# Patient Record
Sex: FEMALE | Race: WHITE | Employment: UNEMPLOYED | URBAN - METROPOLITAN AREA
[De-identification: names, ages, dates, MRNs, and addresses within clinical notes are randomized per-mention and may not be internally consistent; named-entity substitution may affect disease eponyms.]

---

## 2017-01-26 ENCOUNTER — OFFICE VISIT (OUTPATIENT)
Dept: FAMILY MEDICINE CLINIC | Facility: CLINIC | Age: 32
End: 2017-01-26

## 2017-01-26 ENCOUNTER — LAB ENCOUNTER (OUTPATIENT)
Dept: LAB | Age: 32
End: 2017-01-26
Attending: INTERNAL MEDICINE
Payer: COMMERCIAL

## 2017-01-26 VITALS
DIASTOLIC BLOOD PRESSURE: 80 MMHG | SYSTOLIC BLOOD PRESSURE: 120 MMHG | BODY MASS INDEX: 24.03 KG/M2 | HEIGHT: 63 IN | RESPIRATION RATE: 16 BRPM | HEART RATE: 72 BPM | TEMPERATURE: 99 F | WEIGHT: 135.63 LBS

## 2017-01-26 DIAGNOSIS — E55.9 VITAMIN D DEFICIENCY: ICD-10-CM

## 2017-01-26 DIAGNOSIS — E53.8 B12 DEFICIENCY: ICD-10-CM

## 2017-01-26 DIAGNOSIS — Z00.00 LABORATORY EXAMINATION ORDERED AS PART OF A ROUTINE GENERAL MEDICAL EXAMINATION: ICD-10-CM

## 2017-01-26 DIAGNOSIS — Z00.00 WELL WOMAN EXAM (NO GYNECOLOGICAL EXAM): Primary | ICD-10-CM

## 2017-01-26 LAB
25-HYDROXYVITAMIN D (TOTAL): 7.5 NG/ML (ref 30–100)
ALBUMIN SERPL-MCNC: 4.1 G/DL (ref 3.5–4.8)
ALP LIVER SERPL-CCNC: 79 U/L (ref 37–98)
ALT SERPL-CCNC: 16 U/L (ref 14–54)
AST SERPL-CCNC: 11 U/L (ref 15–41)
BASOPHILS # BLD AUTO: 0.03 X10(3) UL (ref 0–0.1)
BASOPHILS NFR BLD AUTO: 0.4 %
BILIRUB SERPL-MCNC: 0.2 MG/DL (ref 0.1–2)
BUN BLD-MCNC: 16 MG/DL (ref 8–20)
CALCIUM BLD-MCNC: 9 MG/DL (ref 8.3–10.3)
CHLORIDE: 104 MMOL/L (ref 101–111)
CHOLEST SMN-MCNC: 146 MG/DL (ref ?–200)
CO2: 28 MMOL/L (ref 22–32)
CREAT BLD-MCNC: 0.69 MG/DL (ref 0.55–1.02)
EOSINOPHIL # BLD AUTO: 0.1 X10(3) UL (ref 0–0.3)
EOSINOPHIL NFR BLD AUTO: 1.4 %
ERYTHROCYTE [DISTWIDTH] IN BLOOD BY AUTOMATED COUNT: 13.6 % (ref 11.5–16)
GLUCOSE BLD-MCNC: 91 MG/DL (ref 70–99)
HAV AB SERPL IA-ACNC: 362 PG/ML (ref 193–986)
HCT VFR BLD AUTO: 37.4 % (ref 34–50)
HDLC SERPL-MCNC: 72 MG/DL (ref 45–?)
HDLC SERPL: 2.03 {RATIO} (ref ?–4.44)
HGB BLD-MCNC: 12.2 G/DL (ref 12–16)
IMMATURE GRANULOCYTE COUNT: 0.02 X10(3) UL (ref 0–1)
IMMATURE GRANULOCYTE RATIO %: 0.3 %
LDLC SERPL CALC-MCNC: 56 MG/DL (ref ?–130)
LYMPHOCYTES # BLD AUTO: 2.1 X10(3) UL (ref 0.9–4)
LYMPHOCYTES NFR BLD AUTO: 30.4 %
M PROTEIN MFR SERPL ELPH: 8.4 G/DL (ref 6.1–8.3)
MCH RBC QN AUTO: 26.6 PG (ref 27–33.2)
MCHC RBC AUTO-ENTMCNC: 32.6 G/DL (ref 31–37)
MCV RBC AUTO: 81.7 FL (ref 81–100)
MONOCYTES # BLD AUTO: 0.46 X10(3) UL (ref 0.1–0.6)
MONOCYTES NFR BLD AUTO: 6.7 %
NEUTROPHIL ABS PRELIM: 4.19 X10 (3) UL (ref 1.3–6.7)
NEUTROPHILS # BLD AUTO: 4.19 X10(3) UL (ref 1.3–6.7)
NEUTROPHILS NFR BLD AUTO: 60.8 %
NONHDLC SERPL-MCNC: 74 MG/DL (ref ?–130)
PLATELET # BLD AUTO: 207 10(3)UL (ref 150–450)
POTASSIUM SERPL-SCNC: 4 MMOL/L (ref 3.6–5.1)
RBC # BLD AUTO: 4.58 X10(6)UL (ref 3.8–5.1)
RED CELL DISTRIBUTION WIDTH-SD: 39.1 FL (ref 35.1–46.3)
SODIUM SERPL-SCNC: 139 MMOL/L (ref 136–144)
TRIGLYCERIDES: 91 MG/DL (ref ?–150)
TSI SER-ACNC: 0.62 MIU/ML (ref 0.35–5.5)
VLDL: 18 MG/DL (ref 5–40)
WBC # BLD AUTO: 6.9 X10(3) UL (ref 4–13)

## 2017-01-26 PROCEDURE — 80053 COMPREHEN METABOLIC PANEL: CPT

## 2017-01-26 PROCEDURE — 99395 PREV VISIT EST AGE 18-39: CPT | Performed by: INTERNAL MEDICINE

## 2017-01-26 PROCEDURE — 84443 ASSAY THYROID STIM HORMONE: CPT

## 2017-01-26 PROCEDURE — 85025 COMPLETE CBC W/AUTO DIFF WBC: CPT

## 2017-01-26 PROCEDURE — 82306 VITAMIN D 25 HYDROXY: CPT

## 2017-01-26 PROCEDURE — 82607 VITAMIN B-12: CPT

## 2017-01-26 PROCEDURE — 36415 COLL VENOUS BLD VENIPUNCTURE: CPT

## 2017-01-26 PROCEDURE — 80061 LIPID PANEL: CPT

## 2017-01-26 NOTE — PROGRESS NOTES
REASON FOR VISIT:    Margo Garay is a 28year old female who presents for an [address]. History of b12 deficiency and feels that she is very fatigued.    Still feeling very tired and exhausted throughout the day with 2 children under t Value Date   ALT 15 06/04/2015   ALT 29 10/06/2014   ALT 17 07/14/2014       Lab Results  Component Value Date   TSH 1.750 06/04/2015   TSH 0.426* 09/11/2013   T4F 1.24 09/04/2013       Lab Results  Component Value Date   BUN 15 06/04/2015   BUN 8 10/06/20 years No results found for this or any previous visit. Fecal Occult Blood  Annually No results found for: FOB, OCCULTSTOOL    Obesity Screening Screen all adults annually Body mass index is 24.03 kg/(m^2).       Preventive Services for Which Recommendati Diabetes      HgbA1C  Annually HEMOGLOBIN A1C (%)   Date Value   09/04/2013 5.5    No flowsheet data found.     Creat/alb ratio  Annually CREATININE, SERUM (mg/dL)   Date Value   09/04/2013 0.65     CREATININE (mg/dL)   Date Value   06/04/2015 0.51   07/1 Never Used                        Alcohol Use: No              Occ: not working  : yes       REVIEW OF SYSTEMS:   GENERAL: feels well otherwise  SKIN: denies any unusual skin lesions  EYES: denies blurred vision or double vision  HEENT: denies nasal struggling with transition of 2 young children, no symptoms of overt depression or post partum but does having issues with adjustment  -will refer to [facility] for further evaluation   -continue to work on vitamin supplementation  -information also given for cr

## 2017-01-27 RX ORDER — ERGOCALCIFEROL 1.25 MG/1
50000 CAPSULE ORAL
Qty: 24 CAPSULE | Refills: 0 | Status: SHIPPED | OUTPATIENT
Start: 2017-01-30 | End: 2017-04-21

## 2017-02-03 ENCOUNTER — NURSE ONLY (OUTPATIENT)
Dept: FAMILY MEDICINE CLINIC | Facility: CLINIC | Age: 32
End: 2017-02-03

## 2017-02-03 PROCEDURE — 96372 THER/PROPH/DIAG INJ SC/IM: CPT | Performed by: INTERNAL MEDICINE

## 2017-02-03 RX ORDER — CYANOCOBALAMIN 1000 UG/ML
1000 INJECTION INTRAMUSCULAR; SUBCUTANEOUS ONCE
Status: COMPLETED | OUTPATIENT
Start: 2017-02-03 | End: 2017-02-03

## 2017-02-03 RX ADMIN — CYANOCOBALAMIN 1000 MCG: 1000 INJECTION INTRAMUSCULAR; SUBCUTANEOUS at 13:58:00

## 2017-02-03 NOTE — PROGRESS NOTES
Notes Recorded by Cameron Dunn MD on 1/27/2017 at 12:32 PM  cmp stable   cholesterol stable  tsh stable   b12 low  Restart b12 injections x 6 months  Vit D very low  Low vitamin D level  Start 50,000 U TWICE per week x 12 weeks  Then start daily maintenance

## 2017-03-01 RX ORDER — CYANOCOBALAMIN 1000 UG/ML
1000 INJECTION INTRAMUSCULAR; SUBCUTANEOUS
Status: DISCONTINUED | OUTPATIENT
Start: 2017-03-01 | End: 2017-04-17

## 2017-03-01 NOTE — PROGRESS NOTES
Notes Recorded by Maeve Castro MD on 1/27/2017 at 12:32 PM  cmp stable   cholesterol stable  tsh stable   b12 low  Restart b12 injections x 6 months  Vit D very low  Low vitamin D level  Start 50,000 U TWICE per week x 12 weeks  Then start daily maintenance

## 2017-03-03 ENCOUNTER — NURSE ONLY (OUTPATIENT)
Dept: FAMILY MEDICINE CLINIC | Facility: CLINIC | Age: 32
End: 2017-03-03

## 2017-03-03 PROCEDURE — 96372 THER/PROPH/DIAG INJ SC/IM: CPT | Performed by: INTERNAL MEDICINE

## 2017-03-03 RX ADMIN — CYANOCOBALAMIN 1000 MCG: 1000 INJECTION INTRAMUSCULAR; SUBCUTANEOUS at 14:59:00

## 2017-03-27 ENCOUNTER — TELEPHONE (OUTPATIENT)
Dept: FAMILY MEDICINE CLINIC | Facility: CLINIC | Age: 32
End: 2017-03-27

## 2017-03-27 NOTE — TELEPHONE ENCOUNTER
Notes Recorded by Gene Multani MD on 1/27/2017 at 12:32 PM  cmp stable   cholesterol stable  tsh stable   b12 low  Restart b12 injections x 6 months  Vit D very low  Low vitamin D level  Start 50,000 U TWICE per week x 12 weeks  Then start daily maintenance

## 2017-03-31 ENCOUNTER — NURSE ONLY (OUTPATIENT)
Dept: FAMILY MEDICINE CLINIC | Facility: CLINIC | Age: 32
End: 2017-03-31

## 2017-03-31 NOTE — PROGRESS NOTES
Notes Recorded by Linda Reece MD on 1/27/2017 at 12:32 PM  cmp stable   cholesterol stable  tsh stable   b12 low  Restart b12 injections x 6 months  Vit D very low  Low vitamin D level  Start 50,000 U TWICE per week x 12 weeks  Then start daily maintenance

## 2017-04-03 ENCOUNTER — NURSE ONLY (OUTPATIENT)
Dept: FAMILY MEDICINE CLINIC | Facility: CLINIC | Age: 32
End: 2017-04-03

## 2017-04-03 ENCOUNTER — TELEPHONE (OUTPATIENT)
Dept: FAMILY MEDICINE CLINIC | Facility: CLINIC | Age: 32
End: 2017-04-03

## 2017-04-03 PROCEDURE — 96372 THER/PROPH/DIAG INJ SC/IM: CPT | Performed by: INTERNAL MEDICINE

## 2017-04-03 RX ADMIN — CYANOCOBALAMIN 1000 MCG: 1000 INJECTION INTRAMUSCULAR; SUBCUTANEOUS at 11:09:00

## 2017-04-03 NOTE — PROGRESS NOTES
Jenna Morales RN at 3/1/2017  7:53 AM      Status: Signed : Jenna Morales RN (Registered Nurse)     Expand All Collapse All    Notes Recorded by Bettie Levine MD on 1/27/2017 at 12:32 PM  cmp stable   cholesterol stable  tsh stable   b12 low  Resta

## 2017-04-03 NOTE — TELEPHONE ENCOUNTER
Pt has a upcoming appt for b-12shots. Please order thank you.    Future Appointments  Date Time Provider Mojgan Ferreira   5/1/2017 10:00 AM [facility] 20 NURSE [facility] [address]

## 2017-04-17 ENCOUNTER — OFFICE VISIT (OUTPATIENT)
Dept: FAMILY MEDICINE CLINIC | Facility: CLINIC | Age: 32
End: 2017-04-17

## 2017-04-17 VITALS
HEART RATE: 72 BPM | DIASTOLIC BLOOD PRESSURE: 72 MMHG | TEMPERATURE: 98 F | BODY MASS INDEX: 22.5 KG/M2 | WEIGHT: 127 LBS | SYSTOLIC BLOOD PRESSURE: 110 MMHG | HEIGHT: 63 IN | RESPIRATION RATE: 16 BRPM

## 2017-04-17 DIAGNOSIS — T75.3XXA MOTION SICKNESS, INITIAL ENCOUNTER: Primary | ICD-10-CM

## 2017-04-17 PROCEDURE — 99213 OFFICE O/P EST LOW 20 MIN: CPT | Performed by: PHYSICIAN ASSISTANT

## 2017-04-17 RX ORDER — SCOLOPAMINE TRANSDERMAL SYSTEM 1 MG/1
1 PATCH, EXTENDED RELEASE TRANSDERMAL
Qty: 6 PATCH | Refills: 0 | Status: SHIPPED | OUTPATIENT
Start: 2017-04-17 | End: 2018-02-07

## 2017-04-17 RX ORDER — CYANOCOBALAMIN 1000 UG/ML
1000 INJECTION INTRAMUSCULAR; SUBCUTANEOUS
COMMUNITY
End: 2018-02-01

## 2017-04-17 NOTE — PROGRESS NOTES
[facility] Internal Medicine Progress Note    CC:  Patient presents with:  Medication Request: motion sickness patch      HPI:   HPI  Pt is going [place] a car trip through the [place]. She gets really bad motion sickness.   She has used scopolamine heard.  Pulmonary/Chest: Effort normal and breath sounds normal. No respiratory distress. She has no wheezes. She has no rales. Abdominal: Soft. Bowel sounds are normal. She exhibits no distension and no mass. There is no tenderness.  There is no rebound

## 2017-04-17 NOTE — PATIENT INSTRUCTIONS
Thank you for choosing Timothy Conteh PA-C at [place] [address]  To Do: Starlette Cords  1. Pt to begin medications as prescribed  2.  Follow-up if any problems    • Please signup for MY CHART, which is electronic access to your record if you have not life.    Referrals, and Radiology Information:    If your insurance requires a referral to a specialist, please allow 5 business days to process your referral request.    If [facility] PINA CHRISTENSEN orders a CT or MRI, it may take up to 10 business days to r

## 2017-05-01 ENCOUNTER — NURSE ONLY (OUTPATIENT)
Dept: FAMILY MEDICINE CLINIC | Facility: CLINIC | Age: 32
End: 2017-05-01

## 2017-05-01 PROCEDURE — 96372 THER/PROPH/DIAG INJ SC/IM: CPT | Performed by: INTERNAL MEDICINE

## 2017-05-01 RX ORDER — CYANOCOBALAMIN 1000 UG/ML
1000 INJECTION INTRAMUSCULAR; SUBCUTANEOUS
Status: COMPLETED | OUTPATIENT
Start: 2017-05-01 | End: 2017-07-05

## 2017-05-01 RX ADMIN — CYANOCOBALAMIN 1000 MCG: 1000 INJECTION INTRAMUSCULAR; SUBCUTANEOUS at 10:00:00

## 2017-05-01 NOTE — PROGRESS NOTES
Jenna Morales RN at 3/1/2017  7:53 AM        Status: Signed  : Jenna Morales RN (Registered Nurse)       Expand All Collapse All    Notes Recorded by Bettie Levine MD on 1/27/2017 at 12:32 PM  cmp stable   cholesterol stable  tsh stable   b12 low

## 2017-06-01 ENCOUNTER — NURSE ONLY (OUTPATIENT)
Dept: FAMILY MEDICINE CLINIC | Facility: CLINIC | Age: 32
End: 2017-06-01

## 2017-06-01 DIAGNOSIS — E53.8 B12 DEFICIENCY: Primary | ICD-10-CM

## 2017-06-01 PROCEDURE — 96372 THER/PROPH/DIAG INJ SC/IM: CPT | Performed by: INTERNAL MEDICINE

## 2017-06-01 RX ADMIN — CYANOCOBALAMIN 1000 MCG: 1000 INJECTION INTRAMUSCULAR; SUBCUTANEOUS at 10:06:00

## 2017-06-01 NOTE — PROGRESS NOTES
Expand All Collapse All    Notes Recorded by Severiano Sing, MD on 1/27/2017 at 12:32 PM  cmp stable   cholesterol stable  tsh stable   b12 low  Restart b12 injections x 6 months  Vit D very low  Low vitamin D level  Start 50,000 U TWICE per week x 12 weeks  T

## 2017-06-15 ENCOUNTER — LAB ENCOUNTER (OUTPATIENT)
Dept: LAB | Age: 32
End: 2017-06-15
Attending: INTERNAL MEDICINE
Payer: COMMERCIAL

## 2017-06-15 ENCOUNTER — OFFICE VISIT (OUTPATIENT)
Dept: FAMILY MEDICINE CLINIC | Facility: CLINIC | Age: 32
End: 2017-06-15

## 2017-06-15 VITALS
HEART RATE: 68 BPM | HEIGHT: 63 IN | SYSTOLIC BLOOD PRESSURE: 110 MMHG | TEMPERATURE: 99 F | BODY MASS INDEX: 21.97 KG/M2 | DIASTOLIC BLOOD PRESSURE: 70 MMHG | RESPIRATION RATE: 16 BRPM | WEIGHT: 124 LBS

## 2017-06-15 DIAGNOSIS — E55.9 VITAMIN D DEFICIENCY: ICD-10-CM

## 2017-06-15 DIAGNOSIS — R10.32 LLQ ABDOMINAL PAIN: Primary | ICD-10-CM

## 2017-06-15 DIAGNOSIS — E53.8 B12 DEFICIENCY: ICD-10-CM

## 2017-06-15 DIAGNOSIS — R10.32 LLQ ABDOMINAL PAIN: ICD-10-CM

## 2017-06-15 DIAGNOSIS — T14.8XXA BRUISING: ICD-10-CM

## 2017-06-15 PROCEDURE — 85025 COMPLETE CBC W/AUTO DIFF WBC: CPT | Performed by: INTERNAL MEDICINE

## 2017-06-15 PROCEDURE — 80053 COMPREHEN METABOLIC PANEL: CPT | Performed by: INTERNAL MEDICINE

## 2017-06-15 PROCEDURE — 82306 VITAMIN D 25 HYDROXY: CPT | Performed by: INTERNAL MEDICINE

## 2017-06-15 PROCEDURE — 85730 THROMBOPLASTIN TIME PARTIAL: CPT | Performed by: INTERNAL MEDICINE

## 2017-06-15 PROCEDURE — 99214 OFFICE O/P EST MOD 30 MIN: CPT | Performed by: INTERNAL MEDICINE

## 2017-06-15 PROCEDURE — 85610 PROTHROMBIN TIME: CPT | Performed by: INTERNAL MEDICINE

## 2017-06-15 PROCEDURE — 36415 COLL VENOUS BLD VENIPUNCTURE: CPT | Performed by: INTERNAL MEDICINE

## 2017-06-15 PROCEDURE — 82607 VITAMIN B-12: CPT | Performed by: INTERNAL MEDICINE

## 2017-06-15 NOTE — PROGRESS NOTES
[facility] Internal Medicine Office Note  Chief Complaint:   Patient presents with:  Bruising: bruising easily   Abdominal Pain: x couples weeks, she has increase her exercise lately       HPI:   This is a 28year old female coming in for  HPI  2 Ht 63\"  Wt 124 lb  BMI 21.97 kg/m2  LMP 06/01/2017 Estimated body mass index is 21.97 kg/(m^2) as calculated from the following:    Height as of this encounter: 63\". Weight as of this encounter: 124 lb. Vital signs reviewed.  Appears stated age, well Future          Orders Placed This Encounter  Prothrombin Time (PT) [E]  PTT, Activated [E]  Vitamin D, 25-Hydroxy [E]  Vitamin B12 [E]  CBC W Differential W Platelet [E]  Comp Metabolic Panel (14) [E]    Meds & Refills for this Visit:  No prescriptions re

## 2017-06-15 NOTE — PATIENT INSTRUCTIONS
Thank you for choosing Asmita eSrrano MD at [place] [address]  To Do: Marliss Hodgkin  1. Labs today  2.  Ct scan of abdomen Call central scheduling [phone] to schedule your test.  Most tests are done in Building A inside ER building next door, or in treatment if problems arise, but know that our intention is that the benefits outweigh those potential risks and we strive to make you healthier and to improve your quality of life.     Referrals, and Radiology Information:    If your insurance requires a r

## 2017-06-16 ENCOUNTER — HOSPITAL ENCOUNTER (OUTPATIENT)
Dept: CT IMAGING | Age: 32
Discharge: HOME | End: 2017-06-16
Attending: INTERNAL MEDICINE
Payer: COMMERCIAL

## 2017-06-16 DIAGNOSIS — R10.32 LLQ ABDOMINAL PAIN: ICD-10-CM

## 2017-06-16 DIAGNOSIS — R79.1 ELEVATED INR: Primary | ICD-10-CM

## 2017-06-16 DIAGNOSIS — T14.8XXA BRUISING: ICD-10-CM

## 2017-06-16 PROBLEM — N83.202 CYST OF OVARY, LEFT: Status: ACTIVE | Noted: 2017-06-16

## 2017-06-16 PROCEDURE — 74177 CT ABD & PELVIS W/CONTRAST: CPT | Performed by: INTERNAL MEDICINE

## 2017-06-19 ENCOUNTER — OFFICE VISIT (OUTPATIENT)
Dept: HEMATOLOGY/ONCOLOGY | Facility: HOSPITAL | Age: 32
End: 2017-06-19
Attending: INTERNAL MEDICINE
Payer: COMMERCIAL

## 2017-06-19 VITALS
DIASTOLIC BLOOD PRESSURE: 65 MMHG | WEIGHT: 125.19 LBS | OXYGEN SATURATION: 100 % | BODY MASS INDEX: 22.18 KG/M2 | RESPIRATION RATE: 18 BRPM | TEMPERATURE: 97 F | HEART RATE: 81 BPM | HEIGHT: 62.99 IN | SYSTOLIC BLOOD PRESSURE: 119 MMHG

## 2017-06-19 DIAGNOSIS — R23.8 EASY BRUISING: ICD-10-CM

## 2017-06-19 DIAGNOSIS — R77.8 ELEVATED TOTAL PROTEIN: Primary | ICD-10-CM

## 2017-06-19 DIAGNOSIS — R79.1 ELEVATED PROTIME: ICD-10-CM

## 2017-06-19 PROBLEM — R23.3 EASY BRUISING: Status: ACTIVE | Noted: 2017-06-19

## 2017-06-19 PROCEDURE — 99244 OFF/OP CNSLTJ NEW/EST MOD 40: CPT | Performed by: INTERNAL MEDICINE

## 2017-06-28 NOTE — CONSULTS
[facility] Report of Consultation    Patient Name: Fredo Chua   YOB: 1985   Medical Record Number: [id number]   CSN: [id number]   Consulting Physician: Lior Khan MD  Referring Physician(s): No ref.  provider found  Date of Consulta   No date: SPECIAL SERVICE OR REPORT      Comment: warts removed on rt foot    Family Medical History:  Family History   Problem Relation Age of Onset   • Heart Disorder Paternal Grandfather    • High Cholesterol Paternal Grandfather    • Other[ot Wt 56.8 kg (125 lb 3.2 oz)   LMP 06/01/2017   SpO2 100%   BMI 22.18 kg/m²     Physical Examination:    General: Patient is alert and oriented x 3, not in acute distress. Psych:  Her mood was calm and appropriate for this visit. HEENT: EOMs intact.  PERR MD

## 2017-06-30 ENCOUNTER — APPOINTMENT (OUTPATIENT)
Dept: HEMATOLOGY/ONCOLOGY | Facility: HOSPITAL | Age: 32
End: 2017-06-30
Attending: INTERNAL MEDICINE
Payer: COMMERCIAL

## 2017-07-05 ENCOUNTER — NURSE ONLY (OUTPATIENT)
Dept: FAMILY MEDICINE CLINIC | Facility: CLINIC | Age: 32
End: 2017-07-05

## 2017-07-05 DIAGNOSIS — E53.8 B12 DEFICIENCY: Primary | ICD-10-CM

## 2017-07-05 PROCEDURE — 96372 THER/PROPH/DIAG INJ SC/IM: CPT | Performed by: INTERNAL MEDICINE

## 2017-07-05 RX ADMIN — CYANOCOBALAMIN 1000 MCG: 1000 INJECTION INTRAMUSCULAR; SUBCUTANEOUS at 15:35:00

## 2017-07-05 NOTE — PROGRESS NOTES
Patient rcvd vitamin B12 injection #6 today. Recently had B12 level drawn.     Ref Range & Units 6/15/17 11:03 AM    Vitamin B12 193 - 986 pg/mL 468

## 2017-07-05 NOTE — PROGRESS NOTES
Notes Recorded by Sandeep Rosenbaum MD on 1/27/2017 at 12:32 PM  cmp stable   cholesterol stable  tsh stable   b12 low  Restart b12 injections x 6 months  Vit D very low  Low vitamin D level  Start 50,000 U TWICE per week x 12 weeks  Then start daily maintenance

## 2017-07-25 ENCOUNTER — TELEPHONE (OUTPATIENT)
Dept: FAMILY MEDICINE CLINIC | Facility: CLINIC | Age: 32
End: 2017-07-25

## 2017-07-25 NOTE — TELEPHONE ENCOUNTER
Requesting B12 injection   LOV: 6/15/17   RTC: none specified   Last Labs:    Ref Range & Units 6/15/17 11:03 AM   Vitamin B12 193 - 986 pg/mL 468     Pt would like to know if she needs to continue her B12 injections.  Her last B12 injection was on 07/05/17

## 2017-08-01 ENCOUNTER — NURSE ONLY (OUTPATIENT)
Dept: FAMILY MEDICINE CLINIC | Facility: CLINIC | Age: 32
End: 2017-08-01

## 2017-08-01 PROCEDURE — 96372 THER/PROPH/DIAG INJ SC/IM: CPT | Performed by: INTERNAL MEDICINE

## 2017-08-01 RX ORDER — CYANOCOBALAMIN 1000 UG/ML
1000 INJECTION INTRAMUSCULAR; SUBCUTANEOUS ONCE
Status: COMPLETED | OUTPATIENT
Start: 2017-08-01 | End: 2017-08-01

## 2017-08-01 RX ADMIN — CYANOCOBALAMIN 1000 MCG: 1000 INJECTION INTRAMUSCULAR; SUBCUTANEOUS at 12:58:00

## 2017-08-01 NOTE — PROGRESS NOTES
Patient is here for B12. Given IM into right deltoid. Patient tolerated well. All questions answered.

## 2017-09-08 ENCOUNTER — NURSE ONLY (OUTPATIENT)
Dept: FAMILY MEDICINE CLINIC | Facility: CLINIC | Age: 32
End: 2017-09-08

## 2017-09-08 PROCEDURE — 96372 THER/PROPH/DIAG INJ SC/IM: CPT | Performed by: INTERNAL MEDICINE

## 2017-09-08 RX ORDER — CYANOCOBALAMIN 1000 UG/ML
1000 INJECTION INTRAMUSCULAR; SUBCUTANEOUS
Status: DISCONTINUED | OUTPATIENT
Start: 2017-09-08 | End: 2018-02-01

## 2017-09-08 RX ADMIN — CYANOCOBALAMIN 1000 MCG: 1000 INJECTION INTRAMUSCULAR; SUBCUTANEOUS at 11:32:00

## 2017-09-08 NOTE — PROGRESS NOTES
Orquidea Thomas MD routed this conversation to [facility] Clinical Staff   Orquidea Thomas MD         7:22 AM   Note      OK to continue monthly   b12 is still very low          July 25, 2017       Injection #1: 8/1/17    #2  Future Appointments  Date Time Provider Andres Selby

## 2017-10-09 ENCOUNTER — NURSE ONLY (OUTPATIENT)
Dept: FAMILY MEDICINE CLINIC | Facility: CLINIC | Age: 32
End: 2017-10-09

## 2017-10-09 DIAGNOSIS — E53.8 B12 DEFICIENCY: ICD-10-CM

## 2017-10-09 PROCEDURE — 96372 THER/PROPH/DIAG INJ SC/IM: CPT | Performed by: INTERNAL MEDICINE

## 2017-10-09 RX ADMIN — CYANOCOBALAMIN 1000 MCG: 1000 INJECTION INTRAMUSCULAR; SUBCUTANEOUS at 15:33:00

## 2017-10-09 NOTE — PROGRESS NOTES
Marcos Jones MD   7:22 AM   Note      OK to continue monthly   b12 is still very low          July 25, 2017     Ref Range & Units 6/15/17 11:03 AM    Vitamin B12 193 - 986 pg/mL 468      New set of B12 injections:  #1 - 8/1/17  #2 - 9/8/17  #3 - Today 10/9/

## 2017-11-02 ENCOUNTER — OFFICE VISIT (OUTPATIENT)
Dept: OBGYN CLINIC | Facility: CLINIC | Age: 32
End: 2017-11-02

## 2017-11-02 VITALS
DIASTOLIC BLOOD PRESSURE: 70 MMHG | HEIGHT: 63 IN | WEIGHT: 125 LBS | HEART RATE: 78 BPM | SYSTOLIC BLOOD PRESSURE: 118 MMHG | BODY MASS INDEX: 22.15 KG/M2

## 2017-11-02 DIAGNOSIS — R10.2 PELVIC PAIN: Primary | ICD-10-CM

## 2017-11-02 PROCEDURE — 99213 OFFICE O/P EST LOW 20 MIN: CPT | Performed by: NURSE PRACTITIONER

## 2017-11-02 RX ORDER — ALBUTEROL SULFATE 90 UG/1
AEROSOL, METERED RESPIRATORY (INHALATION)
COMMUNITY
Start: 2011-03-20 | End: 2018-06-05

## 2017-11-02 NOTE — PROGRESS NOTES
S:  Patient experiencing a constant dull mid to lower left abdominl/ pelvic pain. This pain does worsen at times, becomes sharp. She notices it most with intercourse and when her children are on her lap.  CT done this summer showed a 2.5 cm left corpus lute

## 2017-11-13 ENCOUNTER — NURSE ONLY (OUTPATIENT)
Dept: FAMILY MEDICINE CLINIC | Facility: CLINIC | Age: 32
End: 2017-11-13

## 2017-11-13 PROCEDURE — 96372 THER/PROPH/DIAG INJ SC/IM: CPT | Performed by: INTERNAL MEDICINE

## 2017-11-13 RX ADMIN — CYANOCOBALAMIN 1000 MCG: 1000 INJECTION INTRAMUSCULAR; SUBCUTANEOUS at 12:59:00

## 2017-11-13 NOTE — PROGRESS NOTES
Note      OK to continue monthly   b12 is still very low          July 25, 2017      Ref Range & Units 6/15/17 11:03 AM     Vitamin B12 193 - 986 pg/mL 468       New set of B12 injections:  #1 - 8/1/17  #2 - 9/8/17  #3 -10/9/17  #4: 11/13/17    IM to right

## 2017-12-04 ENCOUNTER — OFFICE VISIT (OUTPATIENT)
Dept: OBGYN CLINIC | Facility: CLINIC | Age: 32
End: 2017-12-04

## 2017-12-04 ENCOUNTER — TELEPHONE (OUTPATIENT)
Dept: FAMILY MEDICINE CLINIC | Facility: CLINIC | Age: 32
End: 2017-12-04

## 2017-12-04 ENCOUNTER — ULTRASOUND ENCOUNTER (OUTPATIENT)
Dept: OBGYN CLINIC | Facility: CLINIC | Age: 32
End: 2017-12-04

## 2017-12-04 DIAGNOSIS — R10.32 ABDOMINAL PAIN, LEFT LOWER QUADRANT: Primary | ICD-10-CM

## 2017-12-04 DIAGNOSIS — R10.32 LEFT LOWER QUADRANT PAIN: Primary | ICD-10-CM

## 2017-12-04 PROCEDURE — 76830 TRANSVAGINAL US NON-OB: CPT | Performed by: OBSTETRICS & GYNECOLOGY

## 2017-12-04 NOTE — TELEPHONE ENCOUNTER
Requesting GI  LOV: 6/15/17 Dr Kam Pang is as follows  Óscar Rojas was seen today for bruising and abdominal pain.     Diagnoses and all orders for this visit:     Bruising new problem uncertain prognosis  Could bruising and abd pain be connected to splenic

## 2017-12-04 NOTE — PROGRESS NOTES
GYNE US review  Discussed 1cm left ovarian follicle/ cyst, unlikely pain related to this. Possible pain from 2 previous csections and adhesive disease. Advised pt to see GI specialist to r/o any GI issues, + fam h/o crohns disease.  Could also consider tria

## 2017-12-20 ENCOUNTER — TELEPHONE (OUTPATIENT)
Dept: OBGYN CLINIC | Facility: CLINIC | Age: 32
End: 2017-12-20

## 2017-12-28 ENCOUNTER — OFFICE VISIT (OUTPATIENT)
Dept: GASTROENTEROLOGY | Facility: CLINIC | Age: 32
End: 2017-12-28

## 2017-12-28 VITALS
DIASTOLIC BLOOD PRESSURE: 74 MMHG | BODY MASS INDEX: 22.06 KG/M2 | SYSTOLIC BLOOD PRESSURE: 114 MMHG | HEIGHT: 64.25 IN | WEIGHT: 129.19 LBS | HEART RATE: 76 BPM

## 2017-12-28 DIAGNOSIS — R10.9 ABDOMINAL PAIN, UNSPECIFIED ABDOMINAL LOCATION: Primary | ICD-10-CM

## 2017-12-28 PROCEDURE — 99212 OFFICE O/P EST SF 10 MIN: CPT | Performed by: INTERNAL MEDICINE

## 2017-12-28 PROCEDURE — 99243 OFF/OP CNSLTJ NEW/EST LOW 30: CPT | Performed by: INTERNAL MEDICINE

## 2017-12-28 NOTE — PATIENT INSTRUCTIONS
Abdominal pain- possible muscle issue/IBS?   - colonoscopy to rule out Crohns  - suprep or colyte  - MAC sedation     Trial of Fodmap diet

## 2018-01-03 NOTE — PROGRESS NOTES
Maryellen Walker is a 33year old female. HPI:   Patient presents with:  Abdomen/Flank Pain (GI/): left lower quadrant,family h/o colon cancer,crohns ,colitis    The patient is a 33year old female who has chronic left abdominal pain.  Worse with exerci every third day. (Patient taking differently: Place 1 patch onto the skin every third day.  Only when traveling ) Disp: 6 patch Rfl: 0       Allergies:    Azithromycin            Diarrhea    Comment:Stomach upset  Bactrim [Sulfametho*    Diarrhea    Comment

## 2018-01-24 ENCOUNTER — HOSPITAL ENCOUNTER (EMERGENCY)
Age: 33
Discharge: HOME | End: 2018-01-25
Attending: EMERGENCY MEDICINE
Payer: COMMERCIAL

## 2018-01-24 VITALS
OXYGEN SATURATION: 99 % | HEIGHT: 63 IN | DIASTOLIC BLOOD PRESSURE: 71 MMHG | WEIGHT: 130 LBS | TEMPERATURE: 98 F | RESPIRATION RATE: 20 BRPM | BODY MASS INDEX: 23.04 KG/M2 | HEART RATE: 92 BPM | SYSTOLIC BLOOD PRESSURE: 122 MMHG

## 2018-01-24 DIAGNOSIS — R19.7 NAUSEA VOMITING AND DIARRHEA: Primary | ICD-10-CM

## 2018-01-24 DIAGNOSIS — R11.2 NAUSEA VOMITING AND DIARRHEA: Primary | ICD-10-CM

## 2018-01-24 LAB
ALBUMIN SERPL-MCNC: 4.4 G/DL (ref 3.5–4.8)
ALP LIVER SERPL-CCNC: 64 U/L (ref 37–98)
ALT SERPL-CCNC: 16 U/L (ref 14–54)
AST SERPL-CCNC: 11 U/L (ref 15–41)
BASOPHILS # BLD AUTO: 0.01 X10(3) UL (ref 0–0.1)
BASOPHILS NFR BLD AUTO: 0.1 %
BILIRUB SERPL-MCNC: 0.6 MG/DL (ref 0.1–2)
BUN BLD-MCNC: 21 MG/DL (ref 8–20)
CALCIUM BLD-MCNC: 8.7 MG/DL (ref 8.3–10.3)
CHLORIDE: 106 MMOL/L (ref 101–111)
CO2: 24 MMOL/L (ref 22–32)
CREAT BLD-MCNC: 0.75 MG/DL (ref 0.55–1.02)
EOSINOPHIL # BLD AUTO: 0.05 X10(3) UL (ref 0–0.3)
EOSINOPHIL NFR BLD AUTO: 0.5 %
ERYTHROCYTE [DISTWIDTH] IN BLOOD BY AUTOMATED COUNT: 13.3 % (ref 11.5–16)
GLUCOSE BLD-MCNC: 153 MG/DL (ref 70–99)
HCT VFR BLD AUTO: 43.1 % (ref 34–50)
HGB BLD-MCNC: 14.5 G/DL (ref 12–16)
IMMATURE GRANULOCYTE COUNT: 0.02 X10(3) UL (ref 0–1)
IMMATURE GRANULOCYTE RATIO %: 0.2 %
LYMPHOCYTES # BLD AUTO: 0.27 X10(3) UL (ref 0.9–4)
LYMPHOCYTES NFR BLD AUTO: 2.6 %
M PROTEIN MFR SERPL ELPH: 8.9 G/DL (ref 6.1–8.3)
MCH RBC QN AUTO: 28 PG (ref 27–33.2)
MCHC RBC AUTO-ENTMCNC: 33.6 G/DL (ref 31–37)
MCV RBC AUTO: 83.4 FL (ref 81–100)
MONOCYTES # BLD AUTO: 0.39 X10(3) UL (ref 0.1–0.6)
MONOCYTES NFR BLD AUTO: 3.7 %
NEUTROPHIL ABS PRELIM: 9.72 X10 (3) UL (ref 1.3–6.7)
NEUTROPHILS # BLD AUTO: 9.72 X10(3) UL (ref 1.3–6.7)
NEUTROPHILS NFR BLD AUTO: 92.9 %
PLATELET # BLD AUTO: 185 10(3)UL (ref 150–450)
POTASSIUM SERPL-SCNC: 3.6 MMOL/L (ref 3.6–5.1)
RBC # BLD AUTO: 5.17 X10(6)UL (ref 3.8–5.1)
RED CELL DISTRIBUTION WIDTH-SD: 40.3 FL (ref 35.1–46.3)
SODIUM SERPL-SCNC: 138 MMOL/L (ref 136–144)
WBC # BLD AUTO: 10.5 X10(3) UL (ref 4–13)

## 2018-01-24 PROCEDURE — 96361 HYDRATE IV INFUSION ADD-ON: CPT

## 2018-01-24 PROCEDURE — 80053 COMPREHEN METABOLIC PANEL: CPT | Performed by: EMERGENCY MEDICINE

## 2018-01-24 PROCEDURE — 99284 EMERGENCY DEPT VISIT MOD MDM: CPT

## 2018-01-24 PROCEDURE — 96372 THER/PROPH/DIAG INJ SC/IM: CPT

## 2018-01-24 PROCEDURE — 96374 THER/PROPH/DIAG INJ IV PUSH: CPT

## 2018-01-24 PROCEDURE — 85025 COMPLETE CBC W/AUTO DIFF WBC: CPT | Performed by: EMERGENCY MEDICINE

## 2018-01-24 RX ORDER — DICYCLOMINE HYDROCHLORIDE 10 MG/ML
20 INJECTION INTRAMUSCULAR ONCE
Status: COMPLETED | OUTPATIENT
Start: 2018-01-24 | End: 2018-01-24

## 2018-01-24 RX ORDER — ONDANSETRON 4 MG/1
4 TABLET, ORALLY DISINTEGRATING ORAL EVERY 4 HOURS PRN
Qty: 10 TABLET | Refills: 0 | Status: SHIPPED | OUTPATIENT
Start: 2018-01-24 | End: 2018-01-31

## 2018-01-24 RX ORDER — ONDANSETRON 2 MG/ML
4 INJECTION INTRAMUSCULAR; INTRAVENOUS ONCE
Status: COMPLETED | OUTPATIENT
Start: 2018-01-24 | End: 2018-01-24

## 2018-01-25 NOTE — ED PROVIDER NOTES
Patient Seen in: THE [facility] Emergency Department In [place]    History   Patient presents with:  Nausea/Vomiting/Diarrhea (gastrointestinal)    Stated Complaint: vomiting    HPI    The patient is a 78-year-old woman presents with nausea vomiting diarrhea Mouth/Throat: Oropharynx is clear and moist. No oropharyngeal exudate. Eyes: Conjunctivae and EOM are normal. Pupils are equal, round, and reactive to light. No scleral icterus. Neck: Normal range of motion. Neck supple.    Cardiovascular: Normal rate RAINBOW DRAW BLUE   RAINBOW DRAW LAVENDER   RAINBOW DRAW LIGHT GREEN   RAINBOW DRAW GOLD         ED Course as of Jan 24 2342  ------------------------------------------------------------       MDM     IV established she was given Zofran fluids.   Doing mu

## 2018-01-26 ENCOUNTER — TELEPHONE (OUTPATIENT)
Dept: FAMILY MEDICINE CLINIC | Facility: CLINIC | Age: 33
End: 2018-01-26

## 2018-02-01 ENCOUNTER — OFFICE VISIT (OUTPATIENT)
Dept: FAMILY MEDICINE CLINIC | Facility: CLINIC | Age: 33
End: 2018-02-01

## 2018-02-01 ENCOUNTER — HOSPITAL ENCOUNTER (OUTPATIENT)
Dept: GENERAL RADIOLOGY | Age: 33
Discharge: HOME | End: 2018-02-01
Attending: PHYSICIAN ASSISTANT
Payer: COMMERCIAL

## 2018-02-01 VITALS
HEIGHT: 63 IN | HEART RATE: 84 BPM | SYSTOLIC BLOOD PRESSURE: 116 MMHG | DIASTOLIC BLOOD PRESSURE: 72 MMHG | RESPIRATION RATE: 16 BRPM | WEIGHT: 124 LBS | BODY MASS INDEX: 21.97 KG/M2

## 2018-02-01 DIAGNOSIS — S09.92XA INJURY OF NOSE, INITIAL ENCOUNTER: Primary | ICD-10-CM

## 2018-02-01 DIAGNOSIS — S09.92XA INJURY OF NOSE, INITIAL ENCOUNTER: ICD-10-CM

## 2018-02-01 PROCEDURE — 99213 OFFICE O/P EST LOW 20 MIN: CPT | Performed by: PHYSICIAN ASSISTANT

## 2018-02-01 PROCEDURE — 70160 X-RAY EXAM OF NASAL BONES: CPT | Performed by: PHYSICIAN ASSISTANT

## 2018-02-01 NOTE — PATIENT INSTRUCTIONS
Thank you for choosing Nancy Caruso PA-C at [place] [address]  To Do: Donovan Hoang  1. Pt to get xrays  2. OTC Acetaminophen/Ibuprofen  3.  Follow-up in 2 weeks pending xrays    • Please signup for [facility] CHART, which is electronic access to your rec testing, please call Central Scheduling at [phone]  Please allow our office 5 business days to contact you regarding any testing results.     Refill policies:   Allow 3 business days for refills; controlled substances may take longer and must be picked

## 2018-02-01 NOTE — PROGRESS NOTES
[facility] Internal Medicine Progress Note    CC:  Patient presents with:  Pain: states she hit her nose on the trunk of her SUV putting her dog in the car - c/o nasal pain and bruising and slight headaches      HPI:   HPI  Hit nose on trunk on M BMI 21.97 kg/m²  Body mass index is 21.97 kg/m². Physical Exam   Constitutional: She is oriented to person, place, and time and well-developed, well-nourished, and in no distress.    HENT:   Mouth/Throat: Oropharynx is clear and moist. No oropharyngeal ex lower extremity with complications     Thrombophlebitis of superficial veins of both lower extremities     Previous  section     Encounter for tubal ligation     Bruising     Cyst of ovary, left     Elevated total protein     Easy bruising     Elev

## 2018-02-01 NOTE — PROGRESS NOTES
No fracture of nasal bones  There is a cyst vs polyp in the R maxillary sinus  Pt can follow-up with ENT, Dr. Clemente Rolling

## 2018-02-07 ENCOUNTER — OFFICE VISIT (OUTPATIENT)
Dept: FAMILY MEDICINE CLINIC | Facility: CLINIC | Age: 33
End: 2018-02-07

## 2018-02-07 VITALS
BODY MASS INDEX: 22.72 KG/M2 | WEIGHT: 128.25 LBS | HEART RATE: 72 BPM | HEIGHT: 63 IN | SYSTOLIC BLOOD PRESSURE: 110 MMHG | TEMPERATURE: 98 F | RESPIRATION RATE: 16 BRPM | DIASTOLIC BLOOD PRESSURE: 80 MMHG

## 2018-02-07 DIAGNOSIS — Z00.00 ANNUAL PHYSICAL EXAM: Primary | ICD-10-CM

## 2018-02-07 DIAGNOSIS — E55.9 VITAMIN D DEFICIENCY: ICD-10-CM

## 2018-02-07 DIAGNOSIS — Z13.31 NEGATIVE DEPRESSION SCREENING: ICD-10-CM

## 2018-02-07 DIAGNOSIS — E53.8 B12 DEFICIENCY: ICD-10-CM

## 2018-02-07 DIAGNOSIS — R73.09 ELEVATED GLUCOSE: ICD-10-CM

## 2018-02-07 DIAGNOSIS — Z23 NEED FOR VACCINATION: ICD-10-CM

## 2018-02-07 PROBLEM — T14.8XXA BRUISING: Status: RESOLVED | Noted: 2017-06-15 | Resolved: 2018-02-07

## 2018-02-07 PROBLEM — R77.8 ELEVATED TOTAL PROTEIN: Status: RESOLVED | Noted: 2017-06-19 | Resolved: 2018-02-07

## 2018-02-07 PROCEDURE — 99395 PREV VISIT EST AGE 18-39: CPT | Performed by: FAMILY MEDICINE

## 2018-02-07 PROCEDURE — 90686 IIV4 VACC NO PRSV 0.5 ML IM: CPT | Performed by: FAMILY MEDICINE

## 2018-02-07 PROCEDURE — 90471 IMMUNIZATION ADMIN: CPT | Performed by: FAMILY MEDICINE

## 2018-02-07 NOTE — PATIENT INSTRUCTIONS
Thank you for allowing me to participate in your care today. I will contact you with any results from today's visit. Lab results are typically available in 2-3 days for blood tests, and 3-5 days for any cultures or Paps.    Please let me know if you h HIV All women should be tested at least once for HIV between the ages of 15 and 59 At routine exams.  Those with risk factors for HIV should be tested at least annually.    Obesity All women in this age group At routine exams   Syphilis Women at increased r Pneumococcal conjugate vaccine (PCV13) and pneumococcal polysaccharide vaccine (PPSV23) Women at increased risk for infection should talk with their healthcare provider PCV13: 1 dose ages 23 to 72 (protects against 13 types of pneumococcal bacteria)  PPSV2 © 3548-3843 [employer] [address] [facility], [facility] [place]. All rights reserved. This information is not intended as a substitute for professional medical care. Always follow your healthcare professional's instructions.

## 2018-02-07 NOTE — PROGRESS NOTES
Marliss Hodgkin is a 33year old female who presents for a complete physical exam.     She is up-to-date on Pap, negative in 2016 and follows with GYN. She has a left ovarian cyst seen on ultrasound 2 months ago. She is asymptomatic.   She has history of Date Value   01/24/2018 16   06/15/2017 18   01/26/2017 16   ----------       Current Outpatient Prescriptions:  Albuterol Sulfate  (90 Base) MCG/ACT Inhalation Aero Soln Inhale into the lungs.  Disp:  Rfl:    Na Sulfate-K Sulfate-Mg Sulf (SUPREP B heartburn  : denies dysuria, vaginal discharge or itching,periods regular   MUSCULOSKELETAL: denies back pain  NEURO: denies headaches  PSYCHE: denies depression or anxiety  HEMATOLOGIC: denies hx of anemia  ENDOCRINE: denies thyroid history  ALL/ASTHMA:

## 2018-02-08 ENCOUNTER — LAB ENCOUNTER (OUTPATIENT)
Dept: LAB | Age: 33
End: 2018-02-08
Attending: FAMILY MEDICINE
Payer: COMMERCIAL

## 2018-02-08 DIAGNOSIS — E53.8 B12 DEFICIENCY: ICD-10-CM

## 2018-02-08 DIAGNOSIS — Z00.00 ANNUAL PHYSICAL EXAM: ICD-10-CM

## 2018-02-08 DIAGNOSIS — E55.9 VITAMIN D DEFICIENCY: ICD-10-CM

## 2018-02-08 LAB
25-HYDROXYVITAMIN D (TOTAL): 20.4 NG/ML (ref 30–100)
ALBUMIN SERPL-MCNC: 4 G/DL (ref 3.5–4.8)
ALP LIVER SERPL-CCNC: 62 U/L (ref 37–98)
ALT SERPL-CCNC: 15 U/L (ref 14–54)
AST SERPL-CCNC: 10 U/L (ref 15–41)
BASOPHILS # BLD AUTO: 0.03 X10(3) UL (ref 0–0.1)
BASOPHILS NFR BLD AUTO: 0.6 %
BILIRUB SERPL-MCNC: 0.5 MG/DL (ref 0.1–2)
BUN BLD-MCNC: 15 MG/DL (ref 8–20)
CALCIUM BLD-MCNC: 9.2 MG/DL (ref 8.3–10.3)
CHLORIDE: 104 MMOL/L (ref 101–111)
CHOLEST SMN-MCNC: 125 MG/DL (ref ?–200)
CO2: 28 MMOL/L (ref 22–32)
CREAT BLD-MCNC: 0.64 MG/DL (ref 0.55–1.02)
EOSINOPHIL # BLD AUTO: 0.15 X10(3) UL (ref 0–0.3)
EOSINOPHIL NFR BLD AUTO: 3.1 %
ERYTHROCYTE [DISTWIDTH] IN BLOOD BY AUTOMATED COUNT: 12.9 % (ref 11.5–16)
GLUCOSE BLD-MCNC: 84 MG/DL (ref 70–99)
HAV AB SERPL IA-ACNC: 402 PG/ML (ref 193–986)
HCT VFR BLD AUTO: 39 % (ref 34–50)
HDLC SERPL-MCNC: 54 MG/DL (ref 45–?)
HDLC SERPL: 2.31 {RATIO} (ref ?–4.44)
HGB BLD-MCNC: 12.9 G/DL (ref 12–16)
IMMATURE GRANULOCYTE COUNT: 0.01 X10(3) UL (ref 0–1)
IMMATURE GRANULOCYTE RATIO %: 0.2 %
LDLC SERPL CALC-MCNC: 59 MG/DL (ref ?–130)
LYMPHOCYTES # BLD AUTO: 1.56 X10(3) UL (ref 0.9–4)
LYMPHOCYTES NFR BLD AUTO: 32.2 %
M PROTEIN MFR SERPL ELPH: 8.2 G/DL (ref 6.1–8.3)
MCH RBC QN AUTO: 27.7 PG (ref 27–33.2)
MCHC RBC AUTO-ENTMCNC: 33.1 G/DL (ref 31–37)
MCV RBC AUTO: 83.7 FL (ref 81–100)
MONOCYTES # BLD AUTO: 0.37 X10(3) UL (ref 0.1–1)
MONOCYTES NFR BLD AUTO: 7.6 %
NEUTROPHIL ABS PRELIM: 2.72 X10 (3) UL (ref 1.3–6.7)
NEUTROPHILS # BLD AUTO: 2.72 X10(3) UL (ref 1.3–6.7)
NEUTROPHILS NFR BLD AUTO: 56.3 %
NONHDLC SERPL-MCNC: 71 MG/DL (ref ?–130)
PLATELET # BLD AUTO: 220 10(3)UL (ref 150–450)
POTASSIUM SERPL-SCNC: 3.8 MMOL/L (ref 3.6–5.1)
RBC # BLD AUTO: 4.66 X10(6)UL (ref 3.8–5.1)
RED CELL DISTRIBUTION WIDTH-SD: 39 FL (ref 35.1–46.3)
SODIUM SERPL-SCNC: 139 MMOL/L (ref 136–144)
TRIGL SERPL-MCNC: 59 MG/DL (ref ?–150)
VLDLC SERPL CALC-MCNC: 12 MG/DL (ref 5–40)
WBC # BLD AUTO: 4.8 X10(3) UL (ref 4–13)

## 2018-02-08 PROCEDURE — 85025 COMPLETE CBC W/AUTO DIFF WBC: CPT | Performed by: FAMILY MEDICINE

## 2018-02-08 PROCEDURE — 36415 COLL VENOUS BLD VENIPUNCTURE: CPT | Performed by: FAMILY MEDICINE

## 2018-02-08 PROCEDURE — 80053 COMPREHEN METABOLIC PANEL: CPT | Performed by: FAMILY MEDICINE

## 2018-02-08 PROCEDURE — 82607 VITAMIN B-12: CPT | Performed by: FAMILY MEDICINE

## 2018-02-08 PROCEDURE — 80061 LIPID PANEL: CPT | Performed by: FAMILY MEDICINE

## 2018-02-08 PROCEDURE — 82306 VITAMIN D 25 HYDROXY: CPT | Performed by: FAMILY MEDICINE

## 2018-02-10 RX ORDER — ERGOCALCIFEROL 1.25 MG/1
50000 CAPSULE ORAL WEEKLY
Qty: 12 CAPSULE | Refills: 0 | Status: SHIPPED | OUTPATIENT
Start: 2018-02-10 | End: 2018-05-11

## 2018-02-28 ENCOUNTER — OFFICE VISIT (OUTPATIENT)
Dept: FAMILY MEDICINE CLINIC | Facility: CLINIC | Age: 33
End: 2018-02-28

## 2018-02-28 VITALS
SYSTOLIC BLOOD PRESSURE: 100 MMHG | RESPIRATION RATE: 16 BRPM | HEIGHT: 63 IN | DIASTOLIC BLOOD PRESSURE: 72 MMHG | WEIGHT: 124 LBS | HEART RATE: 94 BPM | BODY MASS INDEX: 21.97 KG/M2 | TEMPERATURE: 98 F

## 2018-02-28 DIAGNOSIS — J45.990 EXERCISE-INDUCED ASTHMA: ICD-10-CM

## 2018-02-28 DIAGNOSIS — J01.00 ACUTE MAXILLARY SINUSITIS, RECURRENCE NOT SPECIFIED: Primary | ICD-10-CM

## 2018-02-28 PROCEDURE — 99213 OFFICE O/P EST LOW 20 MIN: CPT | Performed by: PHYSICIAN ASSISTANT

## 2018-02-28 RX ORDER — IPRATROPIUM BROMIDE 21 UG/1
2 SPRAY, METERED NASAL EVERY 12 HOURS
Qty: 1 BOTTLE | Refills: 0 | Status: SHIPPED | OUTPATIENT
Start: 2018-02-28 | End: 2018-05-23 | Stop reason: ALTCHOICE

## 2018-02-28 RX ORDER — AMOXICILLIN AND CLAVULANATE POTASSIUM 875; 125 MG/1; MG/1
1 TABLET, FILM COATED ORAL 2 TIMES DAILY
Qty: 20 TABLET | Refills: 0 | Status: SHIPPED | OUTPATIENT
Start: 2018-02-28 | End: 2018-03-10

## 2018-02-28 NOTE — PATIENT INSTRUCTIONS
Thank you for choosing Gilford Bosworth, PA-C at [place] [address]  To Do: Jarred Ortiz  1. Pt to begin medications as prescribed  2.  If symptoms persist or increase call office    • Please signup for MY CHART, which is electronic access to your josh testing, please call Central Scheduling at [phone]  Please allow our office 5 business days to contact you regarding any testing results.     Refill policies:   Allow 3 business days for refills; controlled substances may take longer and must be picked

## 2018-02-28 NOTE — PROGRESS NOTES
[facility] Internal Medicine Progress Note    CC:  Patient presents with:  Sinus Problem: x2 weeks. Tried OTC meds.       HPI:   HPI  Sinus Problem x 2 weeks  Congestion x 2 weeks  OTC meds are not helping  Sore throat  PND  Sinus pressure  Can't Oropharynx is clear and moist. No oropharyngeal exudate. + serous fluid behind TMs  + enlarged nasal turbinates  + clear PND   Eyes: EOM are normal. Pupils are equal, round, and reactive to light. Neck: Normal range of motion. Neck supple.  No thyromega Thrombophlebitis of superficial veins of both lower extremities     Cyst of ovary, left     Easy bruising     Elevated protime     Elevated total protein     Exercise-induced asthma

## 2018-03-08 ENCOUNTER — PATIENT MESSAGE (OUTPATIENT)
Dept: FAMILY MEDICINE CLINIC | Facility: CLINIC | Age: 33
End: 2018-03-08

## 2018-03-08 DIAGNOSIS — Z82.49 FAMILY HISTORY OF HEART DISEASE IN MALE FAMILY MEMBER BEFORE AGE 55: Primary | ICD-10-CM

## 2018-03-08 NOTE — TELEPHONE ENCOUNTER
Order pended if okay      LOV: 2/28/18 Goldie Acute Visit  2/7/18 Dr Iven Barthel annual exam  Last labs: 2/8/18

## 2018-03-08 NOTE — TELEPHONE ENCOUNTER
From: Deepak Vargas  To: Toribio Martinez DO  Sent: 3/8/2018 11:32 AM CST  Subject: Non-Urgent Medical Question    Good afternoon Dr. Matt Kenny!     My  went to a calcium score test through [facility] and I was wondering if I could get a referral to do

## 2018-03-15 ENCOUNTER — TELEPHONE (OUTPATIENT)
Dept: GASTROENTEROLOGY | Facility: CLINIC | Age: 33
End: 2018-03-15

## 2018-03-15 DIAGNOSIS — R10.9 ABDOMINAL PAIN, UNSPECIFIED ABDOMINAL LOCATION: Primary | ICD-10-CM

## 2018-03-15 NOTE — TELEPHONE ENCOUNTER
LM for pt stating that her 3/16/18 procedure time needs to be moved to 12:30 pm, instead of 9:30 am, due to sedation blocks. Asked pt to please return my call to clarify. Please transfer to Garrie Seats at ext [phone] or [phone] for scheduling.  Or please transfe

## 2018-03-16 ENCOUNTER — SURGERY (OUTPATIENT)
Age: 33
End: 2018-03-16

## 2018-03-16 ENCOUNTER — ANESTHESIA (OUTPATIENT)
Dept: ENDOSCOPY | Facility: HOSPITAL | Age: 33
End: 2018-03-16
Payer: COMMERCIAL

## 2018-03-16 ENCOUNTER — ANESTHESIA EVENT (OUTPATIENT)
Dept: ENDOSCOPY | Facility: HOSPITAL | Age: 33
End: 2018-03-16
Payer: COMMERCIAL

## 2018-03-16 ENCOUNTER — HOSPITAL ENCOUNTER (OUTPATIENT)
Facility: HOSPITAL | Age: 33
Setting detail: HOSPITAL OUTPATIENT SURGERY
Discharge: HOME | End: 2018-03-16
Attending: INTERNAL MEDICINE | Admitting: INTERNAL MEDICINE
Payer: COMMERCIAL

## 2018-03-16 DIAGNOSIS — K64.9 HEMORRHOIDS: ICD-10-CM

## 2018-03-16 DIAGNOSIS — R10.9 ABDOMINAL PAIN, UNSPECIFIED ABDOMINAL LOCATION: ICD-10-CM

## 2018-03-16 DIAGNOSIS — K63.5 COLON POLYP: Primary | ICD-10-CM

## 2018-03-16 LAB — B-HCG UR QL: NEGATIVE

## 2018-03-16 PROCEDURE — 0DBE8ZX EXCISION OF LARGE INTESTINE, VIA NATURAL OR ARTIFICIAL OPENING ENDOSCOPIC, DIAGNOSTIC: ICD-10-PCS | Performed by: INTERNAL MEDICINE

## 2018-03-16 PROCEDURE — 45380 COLONOSCOPY AND BIOPSY: CPT | Performed by: INTERNAL MEDICINE

## 2018-03-16 PROCEDURE — 45385 COLONOSCOPY W/LESION REMOVAL: CPT | Performed by: INTERNAL MEDICINE

## 2018-03-16 PROCEDURE — 0DBP8ZX EXCISION OF RECTUM, VIA NATURAL OR ARTIFICIAL OPENING ENDOSCOPIC, DIAGNOSTIC: ICD-10-PCS | Performed by: INTERNAL MEDICINE

## 2018-03-16 PROCEDURE — 0DBN8ZX EXCISION OF SIGMOID COLON, VIA NATURAL OR ARTIFICIAL OPENING ENDOSCOPIC, DIAGNOSTIC: ICD-10-PCS | Performed by: INTERNAL MEDICINE

## 2018-03-16 RX ORDER — SODIUM CHLORIDE, SODIUM LACTATE, POTASSIUM CHLORIDE, CALCIUM CHLORIDE 600; 310; 30; 20 MG/100ML; MG/100ML; MG/100ML; MG/100ML
INJECTION, SOLUTION INTRAVENOUS CONTINUOUS
Status: DISCONTINUED | OUTPATIENT
Start: 2018-03-16 | End: 2018-03-16

## 2018-03-16 RX ORDER — MORPHINE SULFATE 4 MG/ML
2 INJECTION, SOLUTION INTRAMUSCULAR; INTRAVENOUS EVERY 10 MIN PRN
Status: DISCONTINUED | OUTPATIENT
Start: 2018-03-16 | End: 2018-03-16

## 2018-03-16 RX ORDER — HALOPERIDOL 5 MG/ML
0.25 INJECTION INTRAMUSCULAR ONCE AS NEEDED
Status: DISCONTINUED | OUTPATIENT
Start: 2018-03-16 | End: 2018-03-16

## 2018-03-16 RX ORDER — NALOXONE HYDROCHLORIDE 0.4 MG/ML
80 INJECTION, SOLUTION INTRAMUSCULAR; INTRAVENOUS; SUBCUTANEOUS AS NEEDED
Status: DISCONTINUED | OUTPATIENT
Start: 2018-03-16 | End: 2018-03-16

## 2018-03-16 RX ORDER — MIDAZOLAM HYDROCHLORIDE 1 MG/ML
INJECTION INTRAMUSCULAR; INTRAVENOUS AS NEEDED
Status: DISCONTINUED | OUTPATIENT
Start: 2018-03-16 | End: 2018-03-16 | Stop reason: SURG

## 2018-03-16 RX ORDER — HYDROCODONE BITARTRATE AND ACETAMINOPHEN 5; 325 MG/1; MG/1
2 TABLET ORAL AS NEEDED
Status: DISCONTINUED | OUTPATIENT
Start: 2018-03-16 | End: 2018-03-16

## 2018-03-16 RX ORDER — MORPHINE SULFATE 10 MG/ML
6 INJECTION, SOLUTION INTRAMUSCULAR; INTRAVENOUS EVERY 10 MIN PRN
Status: DISCONTINUED | OUTPATIENT
Start: 2018-03-16 | End: 2018-03-16

## 2018-03-16 RX ORDER — MORPHINE SULFATE 4 MG/ML
4 INJECTION, SOLUTION INTRAMUSCULAR; INTRAVENOUS EVERY 10 MIN PRN
Status: DISCONTINUED | OUTPATIENT
Start: 2018-03-16 | End: 2018-03-16

## 2018-03-16 RX ORDER — HYDROCODONE BITARTRATE AND ACETAMINOPHEN 5; 325 MG/1; MG/1
1 TABLET ORAL AS NEEDED
Status: DISCONTINUED | OUTPATIENT
Start: 2018-03-16 | End: 2018-03-16

## 2018-03-16 RX ORDER — SODIUM CHLORIDE, SODIUM LACTATE, POTASSIUM CHLORIDE, CALCIUM CHLORIDE 600; 310; 30; 20 MG/100ML; MG/100ML; MG/100ML; MG/100ML
INJECTION, SOLUTION INTRAVENOUS CONTINUOUS PRN
Status: DISCONTINUED | OUTPATIENT
Start: 2018-03-16 | End: 2018-03-16 | Stop reason: SURG

## 2018-03-16 RX ORDER — ONDANSETRON 2 MG/ML
4 INJECTION INTRAMUSCULAR; INTRAVENOUS ONCE AS NEEDED
Status: DISCONTINUED | OUTPATIENT
Start: 2018-03-16 | End: 2018-03-16

## 2018-03-16 RX ADMIN — SODIUM CHLORIDE, SODIUM LACTATE, POTASSIUM CHLORIDE, CALCIUM CHLORIDE: 600; 310; 30; 20 INJECTION, SOLUTION INTRAVENOUS at 13:10:00

## 2018-03-16 RX ADMIN — SODIUM CHLORIDE, SODIUM LACTATE, POTASSIUM CHLORIDE, CALCIUM CHLORIDE: 600; 310; 30; 20 INJECTION, SOLUTION INTRAVENOUS at 12:39:00

## 2018-03-16 RX ADMIN — MIDAZOLAM HYDROCHLORIDE 2 MG: 1 INJECTION INTRAMUSCULAR; INTRAVENOUS at 12:40:00

## 2018-03-16 NOTE — OPERATIVE REPORT
[facility]OSP - [facility] Endoscopy Report      Preoperative Diagnosis:  - abdominal pain      Postoperative Diagnosis:  - colon polyps x 2  - internal hemorrhoids      Procedure:    Colonoscopy       Surgeon:  Carolyn Funes M.D.     Anesthesia:  MAC esperanza

## 2018-03-16 NOTE — ANESTHESIA POSTPROCEDURE EVALUATION
Patient: Benoit Loera    Procedure Summary     Date:  03/16/18 Room / Location:  [address] [place] [address] ENDOSCOPY 05 / [address] [place] [address] ENDOSCOPY    Anesthesia Start:  6030 Anesthesia Stop:  3577    Procedure:  COLONOSCOPY (N/A ) Diagnosis:       Abdominal pain, unspecified abdominal

## 2018-03-16 NOTE — ANESTHESIA PREPROCEDURE EVALUATION
Anesthesia PreOp Note    HPI:     Carina Dsouza is a 33year old female who presents for preoperative consultation requested by: Mary Frost MD    Date of Surgery: 3/16/2018    Procedure(s):  COLONOSCOPY  Indication: Abdominal pain, unspecified abd 108 (90 Base) MCG/ACT Inhalation Aero Soln Inhale into the lungs. Disp:  Rfl:  3/16/2018   Ipratropium Bromide 0.03 % Nasal Solution 2 sprays by Nasal route every 12 (twelve) hours.  Disp: 1 Bottle Rfl: 0        No current Epic-ordered facility-administered Vital Signs: Body mass index is 22.14 kg/m². height is 1.6 m (5' 3\") and weight is 56.7 kg (125 lb). Her blood pressure is 124/87 and her pulse is 85. Her respiration is 14 and oxygen saturation is 100%.     03/16/18  1129   BP: 124/87   Pulse: 85

## 2018-03-17 VITALS
DIASTOLIC BLOOD PRESSURE: 83 MMHG | OXYGEN SATURATION: 98 % | TEMPERATURE: 98 F | HEIGHT: 63 IN | SYSTOLIC BLOOD PRESSURE: 114 MMHG | WEIGHT: 125 LBS | HEART RATE: 80 BPM | RESPIRATION RATE: 14 BRPM | BODY MASS INDEX: 22.15 KG/M2

## 2018-03-17 PROBLEM — Z80.0 FAMILY HISTORY OF COLON CANCER: Status: ACTIVE | Noted: 2018-03-17

## 2018-03-19 ENCOUNTER — TELEPHONE (OUTPATIENT)
Dept: GASTROENTEROLOGY | Facility: CLINIC | Age: 33
End: 2018-03-19

## 2018-03-19 NOTE — H&P
History & Physical Examination    Patient Name: Puma Rubin  MRN: [id number]  [facility]: [id number]  YOB: 1985  Abdominal pain   Diagnosis:  No prescriptions prior to admission.     No current facility-administered medications for this encounter Physical done within the last 30 days. Any changes noted above. Bia Vargas.  Devora  3/19/2018  12:20 PM

## 2018-03-19 NOTE — TELEPHONE ENCOUNTER
----- Message from Olga John MD sent at 3/17/2018  9:38 AM CDT -----  I wanted to get back to you with your colonoscopy results.   You had 2 colon polyps removed which were benign - one of the polyps was an adenoma so I would advise a repeat colonosc

## 2018-05-08 ENCOUNTER — OFFICE VISIT (OUTPATIENT)
Dept: GASTROENTEROLOGY | Facility: CLINIC | Age: 33
End: 2018-05-08

## 2018-05-08 VITALS
HEART RATE: 92 BPM | HEIGHT: 63 IN | BODY MASS INDEX: 22.11 KG/M2 | DIASTOLIC BLOOD PRESSURE: 76 MMHG | WEIGHT: 124.81 LBS | SYSTOLIC BLOOD PRESSURE: 111 MMHG

## 2018-05-08 DIAGNOSIS — R10.9 ABDOMINAL PAIN, UNSPECIFIED ABDOMINAL LOCATION: Primary | ICD-10-CM

## 2018-05-08 PROCEDURE — 99213 OFFICE O/P EST LOW 20 MIN: CPT | Performed by: INTERNAL MEDICINE

## 2018-05-08 NOTE — PROGRESS NOTES
Zo Burns is a 33year old female. HPI:   Patient presents with: Follow - Up: Colon in March. No current complaints. The patient is a 59-year-old female who follows up in the office today.   She is undergoing evaluation for abdominal pain in Yes              Comment: social        Medications (Active prior to today's visit):    Current Outpatient Prescriptions:  ergocalciferol 69983 units Oral Cap Take 1 capsule (50,000 Units total) by mouth once a week.  Disp: 12 capsule Rfl: 0   Albuterol [facility]

## 2018-05-23 ENCOUNTER — OFFICE VISIT (OUTPATIENT)
Dept: INTERNAL MEDICINE CLINIC | Facility: CLINIC | Age: 33
End: 2018-05-23

## 2018-05-23 ENCOUNTER — HOSPITAL ENCOUNTER (OUTPATIENT)
Dept: GENERAL RADIOLOGY | Age: 33
Discharge: HOME | End: 2018-05-23
Attending: INTERNAL MEDICINE
Payer: COMMERCIAL

## 2018-05-23 VITALS
HEART RATE: 71 BPM | DIASTOLIC BLOOD PRESSURE: 62 MMHG | BODY MASS INDEX: 21.97 KG/M2 | HEIGHT: 63 IN | TEMPERATURE: 98 F | OXYGEN SATURATION: 99 % | RESPIRATION RATE: 16 BRPM | WEIGHT: 124 LBS | SYSTOLIC BLOOD PRESSURE: 102 MMHG

## 2018-05-23 DIAGNOSIS — R06.02 SOB (SHORTNESS OF BREATH): Primary | ICD-10-CM

## 2018-05-23 DIAGNOSIS — J45.990 EXERCISE-INDUCED ASTHMA: ICD-10-CM

## 2018-05-23 DIAGNOSIS — R06.00 DOE (DYSPNEA ON EXERTION): ICD-10-CM

## 2018-05-23 DIAGNOSIS — Z23 NEED FOR PNEUMOCOCCAL VACCINATION: ICD-10-CM

## 2018-05-23 DIAGNOSIS — I20.8 ATYPICAL ANGINA (HCC): ICD-10-CM

## 2018-05-23 DIAGNOSIS — R01.1 MURMUR, CARDIAC: ICD-10-CM

## 2018-05-23 DIAGNOSIS — R06.02 SOB (SHORTNESS OF BREATH): ICD-10-CM

## 2018-05-23 PROBLEM — R77.8 ELEVATED TOTAL PROTEIN: Status: RESOLVED | Noted: 2017-06-19 | Resolved: 2018-05-23

## 2018-05-23 PROBLEM — R23.3 EASY BRUISING: Status: RESOLVED | Noted: 2017-06-19 | Resolved: 2018-05-23

## 2018-05-23 PROBLEM — R23.8 EASY BRUISING: Status: RESOLVED | Noted: 2017-06-19 | Resolved: 2018-05-23

## 2018-05-23 PROBLEM — R79.1 ELEVATED PROTIME: Status: RESOLVED | Noted: 2017-06-19 | Resolved: 2018-05-23

## 2018-05-23 PROCEDURE — 93000 ELECTROCARDIOGRAM COMPLETE: CPT | Performed by: INTERNAL MEDICINE

## 2018-05-23 PROCEDURE — 90471 IMMUNIZATION ADMIN: CPT | Performed by: INTERNAL MEDICINE

## 2018-05-23 PROCEDURE — 90670 PCV13 VACCINE IM: CPT | Performed by: INTERNAL MEDICINE

## 2018-05-23 PROCEDURE — 71046 X-RAY EXAM CHEST 2 VIEWS: CPT | Performed by: INTERNAL MEDICINE

## 2018-05-23 PROCEDURE — 99214 OFFICE O/P EST MOD 30 MIN: CPT | Performed by: INTERNAL MEDICINE

## 2018-05-23 RX ORDER — PANTOPRAZOLE SODIUM 40 MG/1
TABLET, DELAYED RELEASE ORAL
COMMUNITY
Start: 2018-05-19 | End: 2019-01-21

## 2018-05-23 NOTE — PATIENT INSTRUCTIONS
Shortness of Breath (Dyspnea)  Shortness of breath is the feeling that you can't catch your breath or get enough air. It is also known as dyspnea. Dyspnea can be caused by many different conditions.  They include:  · Acute asthma attack  · Worsening of c taken, a specialist will review it. You will be notified of any new findings that may affect your care. Call 911  Shortness of breath may be a sign of a serious medical problem. For example, it may be a problem with your heart or lungs.  Call 911 if you ha

## 2018-05-23 NOTE — PROGRESS NOTES
HPI:    Patient ID: Glory Escobedo is a 33year old female. HPI  33year old woman with a past medical history of exercise induced asthma (well controlled, ACT=25) here with complaint of SOB x 2 years.  She first noticed the SOB after the birth of her s reviewed and are negative. Current Outpatient Prescriptions:  Pantoprazole Sodium 40 MG Oral Tab EC  Disp:  Rfl:    Albuterol Sulfate  (90 Base) MCG/ACT Inhalation Aero Soln Inhale into the lungs.  Disp:  Rfl:      Allergies:  Azithromyci requested or ordered in this encounter       Imaging & Referrals:  ELECTROCARDIOGRAM, COMPLETE  PNEUMOCOCCAL VACC, 13 ERIC IM  CARD ECHO 2D DOPPLER (CPT=93306)       LE#0674

## 2018-05-31 ENCOUNTER — PATIENT MESSAGE (OUTPATIENT)
Dept: INTERNAL MEDICINE CLINIC | Facility: CLINIC | Age: 33
End: 2018-05-31

## 2018-05-31 RX ORDER — ALPRAZOLAM 0.5 MG/1
0.5 TABLET ORAL 2 TIMES DAILY PRN
Qty: 28 TABLET | Refills: 0 | OUTPATIENT
Start: 2018-05-31 | End: 2019-03-18

## 2018-05-31 NOTE — TELEPHONE ENCOUNTER
From: Carina Dsouza  To: Jenn Manning MD  Sent: 5/31/2018 6:01 AM CDT  Subject: Other    Good morning,   I have been feeling overly anxious about my upcoming echo cardiogram is it possible to get something to help with this to relax my mind and nerves?

## 2018-06-05 ENCOUNTER — HOSPITAL ENCOUNTER (OUTPATIENT)
Dept: CV DIAGNOSTICS | Age: 33
Discharge: HOME | End: 2018-06-05
Attending: INTERNAL MEDICINE
Payer: COMMERCIAL

## 2018-06-05 DIAGNOSIS — R06.00 DOE (DYSPNEA ON EXERTION): ICD-10-CM

## 2018-06-05 DIAGNOSIS — R06.02 SOB (SHORTNESS OF BREATH): ICD-10-CM

## 2018-06-05 PROBLEM — R01.1 MURMUR, CARDIAC: Status: RESOLVED | Noted: 2018-05-23 | Resolved: 2018-06-05

## 2018-06-05 PROCEDURE — 93306 TTE W/DOPPLER COMPLETE: CPT | Performed by: INTERNAL MEDICINE

## 2018-10-03 ENCOUNTER — OFFICE VISIT (OUTPATIENT)
Dept: INTERNAL MEDICINE CLINIC | Facility: CLINIC | Age: 33
End: 2018-10-03
Payer: COMMERCIAL

## 2018-10-03 VITALS
HEART RATE: 76 BPM | OXYGEN SATURATION: 99 % | SYSTOLIC BLOOD PRESSURE: 116 MMHG | WEIGHT: 124 LBS | TEMPERATURE: 99 F | DIASTOLIC BLOOD PRESSURE: 64 MMHG | BODY MASS INDEX: 21.97 KG/M2 | RESPIRATION RATE: 16 BRPM | HEIGHT: 63 IN

## 2018-10-03 DIAGNOSIS — K12.0 RECURRENT APHTHOUS STOMATITIS: Primary | ICD-10-CM

## 2018-10-03 PROCEDURE — 90471 IMMUNIZATION ADMIN: CPT | Performed by: NURSE PRACTITIONER

## 2018-10-03 PROCEDURE — 90686 IIV4 VACC NO PRSV 0.5 ML IM: CPT | Performed by: NURSE PRACTITIONER

## 2018-10-03 PROCEDURE — 99213 OFFICE O/P EST LOW 20 MIN: CPT | Performed by: NURSE PRACTITIONER

## 2018-10-03 RX ORDER — DEXAMETHASONE 0.5 MG/5ML
0.5 SOLUTION ORAL 2 TIMES DAILY
Qty: 1 BOTTLE | Refills: 1 | Status: SHIPPED | OUTPATIENT
Start: 2018-10-03 | End: 2019-01-21

## 2018-10-03 RX ORDER — ACYCLOVIR 200 MG/1
200 CAPSULE ORAL ONCE
Qty: 1 CAPSULE | Refills: 0 | Status: SHIPPED | OUTPATIENT
Start: 2018-10-03 | End: 2018-10-03

## 2018-10-03 NOTE — PATIENT INSTRUCTIONS
Canker Sore    A canker sore (also called an aphthous ulcer) is a painful sore on the lining of the mouth. It is most painful during the first few days, and it lasts about 7 to 14 days before going away.   Causes  Canker sores are not cold sores or fever · Don’t eat crusty or crunchy foods such as [place] bread or potato chips. These kinds of foods can hurt the inside of your mouth, or scrape your existing canker sores. Medicines  You can try over-the-counter medicines that cover the sores and numb them.  Veronica Morrison

## 2018-10-03 NOTE — PROGRESS NOTES
HPI:    Patient ID: Amari Kulkarni is a 29year old female. Patient presents with:  Canker Sores: would like medicine for frequent canker sores (monthly)    HPI  Patient has monthly canker sore formation, onset age 16.  She has been using over the count Comment:  warts removed on rt foot  No date: TUBAL LIGATION  Family History   Problem Relation Age of Onset   • Heart Disorder Paternal Grandfather    • High Cholesterol Paternal Grandfather    • Other (Other[other]) Maternal Grandmother         Kathern Risk acyclovir 200 MG Oral Cap Take 1 capsule (200 mg total) by mouth one time for 1 dose. Disp: 1 capsule Rfl: 0   dexamethasone 0.5 MG/5ML Oral Solution Take 5 mL (0.5 mg total) by mouth 2 (two) times daily.  Disp: 1 Bottle Rfl: 1   Albuterol Sulfate  ( Lab Results   Component Value Date    B12 402 02/08/2018     No results found for: PHOS, PHOSPHORUS  No results found for: MG     PHYSICAL EXAM:   /64   Pulse 76   Temp 98.5 °F (36.9 °C) (Oral)   Resp 16   Ht 63\"   Wt 124 lb   LMP 09/19/2018 (Approx · Foods that can irritate the mouth, including tomatoes, citrus fruits, and some nuts (foods that are acidic or contain bitter substances called tannins)  · Irritating chemicals, such as those in some toothpastes and mouthwashes  · Certain chronic illnesse · Mix equal amounts of hydrogen peroxide and water. You can use this as a mouthwash or dab it on spots with a cotton swab. You can also add sodium bicarbonate to this to make a paste, and then dab it on spots.   Follow-up care  Follow up with your healthcar

## 2018-10-04 ENCOUNTER — PATIENT MESSAGE (OUTPATIENT)
Dept: INTERNAL MEDICINE CLINIC | Facility: CLINIC | Age: 33
End: 2018-10-04

## 2018-10-04 NOTE — TELEPHONE ENCOUNTER
Discussed with the pharmacist the Rx was written for the pt's recurrent canker sores. Rx will be sent with a 500ml bottle for flare ups.

## 2018-10-04 NOTE — TELEPHONE ENCOUNTER
From: Catrachita Mcdonald  To: SANJAY Orr  Sent: 10/4/2018 2:47 PM CDT  Subject: Prescription Question    Good afternoon!  I was in there yesterday morning and the pharmacist said that the prescription that was sent over they needed some more informat

## 2018-10-05 ENCOUNTER — PATIENT MESSAGE (OUTPATIENT)
Dept: INTERNAL MEDICINE CLINIC | Facility: CLINIC | Age: 33
End: 2018-10-05

## 2018-10-05 NOTE — TELEPHONE ENCOUNTER
From: Fredo Chua  To: SANJAY Scott  Sent: 10/5/2018 11:16 AM CDT  Subject: Prescription Question    The pharmacist said I should swallow Dexamethasone I thought you said I should swish and spit it?  The pharmacist said it could be hard on the s

## 2018-11-06 ENCOUNTER — OFFICE VISIT (OUTPATIENT)
Dept: INTERNAL MEDICINE CLINIC | Facility: CLINIC | Age: 33
End: 2018-11-06
Payer: COMMERCIAL

## 2018-11-06 VITALS
BODY MASS INDEX: 21.97 KG/M2 | TEMPERATURE: 98 F | WEIGHT: 124 LBS | HEIGHT: 63 IN | DIASTOLIC BLOOD PRESSURE: 76 MMHG | RESPIRATION RATE: 16 BRPM | SYSTOLIC BLOOD PRESSURE: 124 MMHG | OXYGEN SATURATION: 98 % | HEART RATE: 88 BPM

## 2018-11-06 DIAGNOSIS — M53.3 COCCYX PAIN: Primary | ICD-10-CM

## 2018-11-06 PROCEDURE — 99213 OFFICE O/P EST LOW 20 MIN: CPT | Performed by: PHYSICIAN ASSISTANT

## 2018-11-06 RX ORDER — CELECOXIB 200 MG/1
200 CAPSULE ORAL 2 TIMES DAILY
Qty: 14 CAPSULE | Refills: 0 | Status: SHIPPED | OUTPATIENT
Start: 2018-11-06 | End: 2019-01-21

## 2018-11-06 RX ORDER — ACYCLOVIR 200 MG/1
CAPSULE ORAL
COMMUNITY
Start: 2018-10-03 | End: 2019-01-21

## 2018-11-06 NOTE — PATIENT INSTRUCTIONS
Take tylenol 1000mg up to every 8 hours as needed for pain, along with celebrex once a day  Complete x-ray and we will call with results

## 2018-11-07 ENCOUNTER — HOSPITAL ENCOUNTER (OUTPATIENT)
Dept: GENERAL RADIOLOGY | Age: 33
Discharge: HOME | End: 2018-11-07
Attending: PHYSICIAN ASSISTANT
Payer: COMMERCIAL

## 2018-11-07 DIAGNOSIS — M53.3 COCCYX PAIN: ICD-10-CM

## 2018-11-07 PROCEDURE — 72220 X-RAY EXAM SACRUM TAILBONE: CPT | Performed by: PHYSICIAN ASSISTANT

## 2018-11-07 NOTE — PROGRESS NOTES
HPI:    Patient ID: Carroll Wilkinson is a 29year old female. Patient presents with:  Low Back Pain: tail bone pain since this am after accidently sitting on wooden arm of couch     Low Back Pain   This is a new problem. The current episode started today. Sodium 40 MG Oral Tab EC  Disp:  Rfl:      Allergies:  Azithromycin            DIARRHEA    Comment:Stomach upset  Bactrim [Sulfametho*    DIARRHEA    Comment:Stomach upset   PHYSICAL EXAM:   Physical Exam   Nursing note and vitals reviewed.    Constitutiona

## 2018-12-19 ENCOUNTER — PATIENT MESSAGE (OUTPATIENT)
Dept: INTERNAL MEDICINE CLINIC | Facility: CLINIC | Age: 33
End: 2018-12-19

## 2018-12-20 NOTE — TELEPHONE ENCOUNTER
From: Drea Mendoza  To: Holley Covington PA-C  Sent: 12/19/2018 7:48 PM CST  Subject: Test Results Question    Can I have my x rays of my tailbone from November emailed to my chiropractor at [email or web address] and [facility]

## 2019-01-10 ENCOUNTER — PATIENT OUTREACH (OUTPATIENT)
Dept: INTERNAL MEDICINE CLINIC | Facility: CLINIC | Age: 34
End: 2019-01-10

## 2019-01-12 ENCOUNTER — HOSPITAL ENCOUNTER (OUTPATIENT)
Age: 34
Discharge: HOME | End: 2019-01-12
Payer: COMMERCIAL

## 2019-01-12 VITALS
OXYGEN SATURATION: 98 % | SYSTOLIC BLOOD PRESSURE: 114 MMHG | TEMPERATURE: 98 F | HEART RATE: 84 BPM | DIASTOLIC BLOOD PRESSURE: 77 MMHG | RESPIRATION RATE: 18 BRPM

## 2019-01-12 DIAGNOSIS — J01.00 ACUTE MAXILLARY SINUSITIS, RECURRENCE NOT SPECIFIED: Primary | ICD-10-CM

## 2019-01-12 PROCEDURE — 99204 OFFICE O/P NEW MOD 45 MIN: CPT

## 2019-01-12 PROCEDURE — 99213 OFFICE O/P EST LOW 20 MIN: CPT

## 2019-01-12 RX ORDER — FLUTICASONE PROPIONATE 50 MCG
2 SPRAY, SUSPENSION (ML) NASAL DAILY
Qty: 1 BOTTLE | Refills: 0 | Status: SHIPPED | OUTPATIENT
Start: 2019-01-12 | End: 2019-01-21

## 2019-01-12 RX ORDER — AMOXICILLIN AND CLAVULANATE POTASSIUM 875; 125 MG/1; MG/1
1 TABLET, FILM COATED ORAL 2 TIMES DAILY
Qty: 20 TABLET | Refills: 0 | Status: SHIPPED | OUTPATIENT
Start: 2019-01-12 | End: 2019-01-21

## 2019-01-12 NOTE — ED PROVIDER NOTES
Patient Seen in: [facility]    History   Patient presents with:  Cough/URI    Stated Complaint: Sinus pain/pressure,congestion, Earache R    HPI    The patient presents with complaints of feeling sick for one week.   Symptoms inc Pulse 84   Resp 18   Temp 98.3 °F (36.8 °C)   Temp src Temporal   SpO2 98 %   O2 Device None (Room air)       Current:/77   Pulse 84   Temp 98.3 °F (36.8 °C) (Temporal)   Resp 18   LMP 01/02/2019 (Approximate)   SpO2 98%   Breastfeeding?  No Nare route daily for 14 days.   Qty: 1 Bottle Refills: 0

## 2019-01-21 ENCOUNTER — OFFICE VISIT (OUTPATIENT)
Dept: OBGYN CLINIC | Facility: CLINIC | Age: 34
End: 2019-01-21
Payer: COMMERCIAL

## 2019-01-21 VITALS
SYSTOLIC BLOOD PRESSURE: 118 MMHG | BODY MASS INDEX: 22.15 KG/M2 | HEIGHT: 63 IN | WEIGHT: 125 LBS | DIASTOLIC BLOOD PRESSURE: 74 MMHG

## 2019-01-21 DIAGNOSIS — Z12.4 CERVICAL CANCER SCREENING: ICD-10-CM

## 2019-01-21 DIAGNOSIS — Z01.419 WELL WOMAN EXAM WITH ROUTINE GYNECOLOGICAL EXAM: Primary | ICD-10-CM

## 2019-01-21 PROCEDURE — 99395 PREV VISIT EST AGE 18-39: CPT | Performed by: NURSE PRACTITIONER

## 2019-01-21 PROCEDURE — 87624 HPV HI-RISK TYP POOLED RSLT: CPT | Performed by: NURSE PRACTITIONER

## 2019-01-21 RX ORDER — FLUCONAZOLE 150 MG/1
TABLET ORAL
Qty: 3 TABLET | Refills: 0 | Status: SHIPPED | OUTPATIENT
Start: 2019-01-21 | End: 2019-02-11

## 2019-01-21 NOTE — PROGRESS NOTES
Here for Routine Annual Exam  No concerns, dealing with yeast infection sx from antibiotic use. Menses are regular, lighter the last few cycles. Contraception- TL. No C/O, had left breast pain with menses last month.     ROS: No Cardiac, Respiratory,

## 2019-01-24 LAB — HPV I/H RISK 1 DNA SPEC QL NAA+PROBE: NEGATIVE

## 2019-02-11 ENCOUNTER — OFFICE VISIT (OUTPATIENT)
Dept: INTERNAL MEDICINE CLINIC | Facility: CLINIC | Age: 34
End: 2019-02-11
Payer: COMMERCIAL

## 2019-02-11 VITALS
HEIGHT: 63 IN | SYSTOLIC BLOOD PRESSURE: 116 MMHG | WEIGHT: 124 LBS | RESPIRATION RATE: 16 BRPM | HEART RATE: 102 BPM | BODY MASS INDEX: 21.97 KG/M2 | TEMPERATURE: 99 F | DIASTOLIC BLOOD PRESSURE: 72 MMHG | OXYGEN SATURATION: 97 %

## 2019-02-11 DIAGNOSIS — B37.9 YEAST INFECTION: ICD-10-CM

## 2019-02-11 DIAGNOSIS — J01.90 ACUTE BACTERIAL RHINOSINUSITIS: Primary | ICD-10-CM

## 2019-02-11 DIAGNOSIS — B96.89 ACUTE BACTERIAL RHINOSINUSITIS: Primary | ICD-10-CM

## 2019-02-11 DIAGNOSIS — J45.990 EXERCISE-INDUCED ASTHMA: ICD-10-CM

## 2019-02-11 PROCEDURE — 99213 OFFICE O/P EST LOW 20 MIN: CPT | Performed by: NURSE PRACTITIONER

## 2019-02-11 RX ORDER — FLUCONAZOLE 150 MG/1
150 TABLET ORAL ONCE
Qty: 1 TABLET | Refills: 0 | Status: SHIPPED | OUTPATIENT
Start: 2019-02-11 | End: 2019-02-11

## 2019-02-11 RX ORDER — AMOXICILLIN AND CLAVULANATE POTASSIUM 875; 125 MG/1; MG/1
1 TABLET, FILM COATED ORAL 2 TIMES DAILY
Qty: 20 TABLET | Refills: 0 | Status: SHIPPED | OUTPATIENT
Start: 2019-02-11 | End: 2019-02-21

## 2019-02-11 NOTE — PROGRESS NOTES
HPI:    Patient ID: Andrea Ryan is a 29year old female.     Patient presents with:  Sinus Problem: pt was seen in jan at --pt given an antibiotic and pt never completed the full course because she got a yeast infection--pt still has congestion and  headaches. Negative for dizziness, weakness and light-headedness.          Past Medical History:   Diagnosis Date   • Allergic rhinitis - cat,ragweed,weeds,dust mites 9/4/2013   • Anemia     during pregnancy   • Anxiety     no meds   • Asthma    • B12 defic control/protection: Tubal Ligation    Other Topics      Concerns:        Caffeine Concern: Yes          3 to6 coffees a day        Exercise: No          walking        Seat Belt: Yes         Current Outpatient Medications:  Amoxicillin-Pot Clavulanate 875- 01/26/2017     Lab Results   Component Value Date    VITD 20.4 (L) 02/08/2018     No results found for: DERRICK  No results found for: FOLIC, FOLATESER, FOLATE  No results found for: IRON, IRONTOT  Lab Results   Component Value Date    B12 402 02/08/2018     N days.    Yeast infection- take after abx completed, recommend monostat nightly to help prevention of yeast infection  -     fluconazole (DIFLUCAN) 150 MG Oral Tab; Take 1 tablet (150 mg total) by mouth once for 1 dose.     Exercise-induced asthma- well cont

## 2019-02-11 NOTE — PATIENT INSTRUCTIONS
Flonase 1 spray each nostril twice a day x 14 days  Increase water intake  Clartin at night x 14 days

## 2019-02-20 ENCOUNTER — LAB ENCOUNTER (OUTPATIENT)
Dept: LAB | Age: 34
End: 2019-02-20
Attending: INTERNAL MEDICINE
Payer: COMMERCIAL

## 2019-02-20 DIAGNOSIS — E55.9 VITAMIN D DEFICIENCY: Primary | ICD-10-CM

## 2019-02-20 DIAGNOSIS — Z13.21 ENCOUNTER FOR VITAMIN DEFICIENCY SCREENING: ICD-10-CM

## 2019-02-20 DIAGNOSIS — Z00.00 LABORATORY EXAMINATION ORDERED AS PART OF A COMPLETE PHYSICAL EXAMINATION: ICD-10-CM

## 2019-02-20 LAB
ALBUMIN SERPL-MCNC: 4.2 G/DL (ref 3.4–5)
ALBUMIN/GLOB SERPL: 1 {RATIO} (ref 1–2)
ALP LIVER SERPL-CCNC: 55 U/L (ref 37–98)
ALT SERPL-CCNC: 17 U/L (ref 13–56)
ANION GAP SERPL CALC-SCNC: 5 MMOL/L (ref 0–18)
AST SERPL-CCNC: 11 U/L (ref 15–37)
BASOPHILS # BLD AUTO: 0.02 X10(3) UL (ref 0–0.2)
BASOPHILS NFR BLD AUTO: 0.5 %
BILIRUB SERPL-MCNC: 0.5 MG/DL (ref 0.1–2)
BILIRUB UR QL STRIP.AUTO: NEGATIVE
BUN BLD-MCNC: 13 MG/DL (ref 7–18)
BUN/CREAT SERPL: 16.5 (ref 10–20)
CALCIUM BLD-MCNC: 8.9 MG/DL (ref 8.5–10.1)
CHLORIDE SERPL-SCNC: 105 MMOL/L (ref 98–107)
CHOLEST SMN-MCNC: 125 MG/DL (ref ?–200)
CLARITY UR REFRACT.AUTO: CLEAR
CO2 SERPL-SCNC: 27 MMOL/L (ref 21–32)
COLOR UR AUTO: YELLOW
CREAT BLD-MCNC: 0.79 MG/DL (ref 0.55–1.02)
DEPRECATED RDW RBC AUTO: 38.5 FL (ref 35.1–46.3)
EOSINOPHIL # BLD AUTO: 0.11 X10(3) UL (ref 0–0.7)
EOSINOPHIL NFR BLD AUTO: 2.7 %
ERYTHROCYTE [DISTWIDTH] IN BLOOD BY AUTOMATED COUNT: 12.6 % (ref 11–15)
EST. AVERAGE GLUCOSE BLD GHB EST-MCNC: 105 MG/DL (ref 68–126)
GLOBULIN PLAS-MCNC: 4.2 G/DL (ref 2.8–4.4)
GLUCOSE BLD-MCNC: 83 MG/DL (ref 70–99)
GLUCOSE UR STRIP.AUTO-MCNC: NEGATIVE MG/DL
HBA1C MFR BLD HPLC: 5.3 % (ref ?–5.7)
HCT VFR BLD AUTO: 40.5 % (ref 35–48)
HDLC SERPL-MCNC: 60 MG/DL (ref 40–59)
HGB BLD-MCNC: 13.4 G/DL (ref 12–16)
IMM GRANULOCYTES # BLD AUTO: 0.01 X10(3) UL (ref 0–1)
IMM GRANULOCYTES NFR BLD: 0.2 %
KETONES UR STRIP.AUTO-MCNC: NEGATIVE MG/DL
LDLC SERPL CALC-MCNC: 55 MG/DL (ref ?–100)
LEUKOCYTE ESTERASE UR QL STRIP.AUTO: NEGATIVE
LYMPHOCYTES # BLD AUTO: 1.52 X10(3) UL (ref 1–4)
LYMPHOCYTES NFR BLD AUTO: 37.3 %
M PROTEIN MFR SERPL ELPH: 8.4 G/DL (ref 6.4–8.2)
MCH RBC QN AUTO: 28.2 PG (ref 26–34)
MCHC RBC AUTO-ENTMCNC: 33.1 G/DL (ref 31–37)
MCV RBC AUTO: 85.1 FL (ref 80–100)
MONOCYTES # BLD AUTO: 0.26 X10(3) UL (ref 0.1–1)
MONOCYTES NFR BLD AUTO: 6.4 %
NEUTROPHILS # BLD AUTO: 2.16 X10 (3) UL (ref 1.5–7.7)
NEUTROPHILS # BLD AUTO: 2.16 X10(3) UL (ref 1.5–7.7)
NEUTROPHILS NFR BLD AUTO: 52.9 %
NITRITE UR QL STRIP.AUTO: NEGATIVE
NONHDLC SERPL-MCNC: 65 MG/DL (ref ?–130)
OSMOLALITY SERPL CALC.SUM OF ELEC: 283 MOSM/KG (ref 275–295)
PH UR STRIP.AUTO: 6 [PH] (ref 4.5–8)
PLATELET # BLD AUTO: 204 10(3)UL (ref 150–450)
POTASSIUM SERPL-SCNC: 4 MMOL/L (ref 3.5–5.1)
PROT UR STRIP.AUTO-MCNC: NEGATIVE MG/DL
RBC # BLD AUTO: 4.76 X10(6)UL (ref 3.8–5.3)
SODIUM SERPL-SCNC: 137 MMOL/L (ref 136–145)
SP GR UR STRIP.AUTO: 1.01 (ref 1–1.03)
TRIGL SERPL-MCNC: 50 MG/DL (ref 30–149)
TSI SER-ACNC: 0.83 MIU/ML (ref 0.36–3.74)
UROBILINOGEN UR STRIP.AUTO-MCNC: <2 MG/DL
VIT B12 SERPL-MCNC: 463 PG/ML (ref 193–986)
VIT D+METAB SERPL-MCNC: 25 NG/ML (ref 30–100)
VLDLC SERPL CALC-MCNC: 10 MG/DL (ref 0–30)
WBC # BLD AUTO: 4.1 X10(3) UL (ref 4–11)

## 2019-02-20 PROCEDURE — 82306 VITAMIN D 25 HYDROXY: CPT | Performed by: INTERNAL MEDICINE

## 2019-02-20 PROCEDURE — 36415 COLL VENOUS BLD VENIPUNCTURE: CPT | Performed by: INTERNAL MEDICINE

## 2019-02-20 PROCEDURE — 83036 HEMOGLOBIN GLYCOSYLATED A1C: CPT | Performed by: INTERNAL MEDICINE

## 2019-02-20 PROCEDURE — 80050 GENERAL HEALTH PANEL: CPT | Performed by: INTERNAL MEDICINE

## 2019-02-20 PROCEDURE — 81001 URINALYSIS AUTO W/SCOPE: CPT | Performed by: INTERNAL MEDICINE

## 2019-02-20 PROCEDURE — 82607 VITAMIN B-12: CPT | Performed by: INTERNAL MEDICINE

## 2019-02-20 PROCEDURE — 80061 LIPID PANEL: CPT | Performed by: INTERNAL MEDICINE

## 2019-02-20 RX ORDER — ERGOCALCIFEROL (VITAMIN D2) 1250 MCG
50000 CAPSULE ORAL WEEKLY
Qty: 12 CAPSULE | Refills: 0 | Status: SHIPPED | OUTPATIENT
Start: 2019-02-20 | End: 2019-05-13

## 2019-02-25 ENCOUNTER — OFFICE VISIT (OUTPATIENT)
Dept: INTERNAL MEDICINE CLINIC | Facility: CLINIC | Age: 34
End: 2019-02-25
Payer: COMMERCIAL

## 2019-02-25 ENCOUNTER — TELEPHONE (OUTPATIENT)
Dept: INTERNAL MEDICINE CLINIC | Facility: CLINIC | Age: 34
End: 2019-02-25

## 2019-02-25 VITALS
WEIGHT: 124 LBS | TEMPERATURE: 98 F | DIASTOLIC BLOOD PRESSURE: 78 MMHG | RESPIRATION RATE: 16 BRPM | SYSTOLIC BLOOD PRESSURE: 112 MMHG | HEART RATE: 80 BPM | BODY MASS INDEX: 21.97 KG/M2 | HEIGHT: 63 IN

## 2019-02-25 DIAGNOSIS — S16.1XXA STRAIN OF NECK MUSCLE, INITIAL ENCOUNTER: ICD-10-CM

## 2019-02-25 DIAGNOSIS — Z00.00 ANNUAL PHYSICAL EXAM: Primary | ICD-10-CM

## 2019-02-25 PROCEDURE — 99395 PREV VISIT EST AGE 18-39: CPT | Performed by: INTERNAL MEDICINE

## 2019-02-25 RX ORDER — CYCLOBENZAPRINE HCL 10 MG
10 TABLET ORAL NIGHTLY
Qty: 5 TABLET | Refills: 0 | Status: SHIPPED | OUTPATIENT
Start: 2019-02-25 | End: 2019-06-28 | Stop reason: ALTCHOICE

## 2019-02-25 RX ORDER — SCOLOPAMINE TRANSDERMAL SYSTEM 1 MG/1
1 PATCH, EXTENDED RELEASE TRANSDERMAL
Qty: 3 PATCH | Refills: 0 | Status: SHIPPED | OUTPATIENT
Start: 2019-02-25 | End: 2019-05-01

## 2019-02-25 NOTE — TELEPHONE ENCOUNTER
Fax from [facility] on file- Scopolamine 1.5mg TD patch being discontinued, brand available, can we send new rx? Form in triage.

## 2019-02-26 NOTE — PROGRESS NOTES
HPI:    Patient ID: Drea Mendoza is a 29year old female. HPI  HPI:   Drea Mendoza is a 29year old female who presents for a complete physical exam. Symptoms: denies discharge, itching, burning or dysuria, periods are regular.  Patient complains o Tab Take 1 tablet (10 mg total) by mouth nightly. Disp: 5 tablet Rfl: 0   Scopolamine 1.5mg TD patch 1mg/3days Place 1 patch onto the skin every third day.  Disp: 3 patch Rfl: 0   ergocalciferol 90321 units Oral Cap Take 1 capsule (50,000 Units total) by mo • Cancer Maternal Aunt 50        colon ca      Social History:   Social History    Tobacco Use      Smoking status: Never Smoker      Smokeless tobacco: Never Used    Alcohol use: Yes      Frequency: Monthly or less      Comment: social     Drug use: No explained. Health maintenance, will check fasting Lipids, CMP, and CBC. Pt info handouts given for: exercise, low fat diet and breast self-exam.   Pt has a neck strain. Finish naproxen 500 mg bid x 2 weeks.  Finish flexeril as ordered   Body mass index is

## 2019-03-07 ENCOUNTER — TELEPHONE (OUTPATIENT)
Dept: INTERNAL MEDICINE CLINIC | Facility: CLINIC | Age: 34
End: 2019-03-07

## 2019-03-07 NOTE — TELEPHONE ENCOUNTER
Per pharmacist, they only have brand name in stock. Want to know if okay to dispense brand rather than generic. Advised provider is okay with brand name. They will contact patient.

## 2019-03-18 ENCOUNTER — HOSPITAL ENCOUNTER (OUTPATIENT)
Dept: GENERAL RADIOLOGY | Age: 34
Discharge: HOME | End: 2019-03-18
Attending: PHYSICIAN ASSISTANT
Payer: COMMERCIAL

## 2019-03-18 ENCOUNTER — OFFICE VISIT (OUTPATIENT)
Dept: INTERNAL MEDICINE CLINIC | Facility: CLINIC | Age: 34
End: 2019-03-18
Payer: COMMERCIAL

## 2019-03-18 VITALS
DIASTOLIC BLOOD PRESSURE: 82 MMHG | RESPIRATION RATE: 16 BRPM | TEMPERATURE: 98 F | SYSTOLIC BLOOD PRESSURE: 118 MMHG | HEART RATE: 88 BPM | HEIGHT: 63 IN | WEIGHT: 117.5 LBS | BODY MASS INDEX: 20.82 KG/M2

## 2019-03-18 DIAGNOSIS — M54.2 CHRONIC NECK PAIN: ICD-10-CM

## 2019-03-18 DIAGNOSIS — F41.9 ANXIETY: ICD-10-CM

## 2019-03-18 DIAGNOSIS — R51.9 LEFT-SIDED HEADACHE: Primary | ICD-10-CM

## 2019-03-18 DIAGNOSIS — G89.29 CHRONIC NECK PAIN: ICD-10-CM

## 2019-03-18 DIAGNOSIS — R11.0 NAUSEA: ICD-10-CM

## 2019-03-18 PROCEDURE — 99214 OFFICE O/P EST MOD 30 MIN: CPT | Performed by: PHYSICIAN ASSISTANT

## 2019-03-18 PROCEDURE — 72050 X-RAY EXAM NECK SPINE 4/5VWS: CPT | Performed by: PHYSICIAN ASSISTANT

## 2019-03-18 RX ORDER — ALPRAZOLAM 0.5 MG/1
0.5 TABLET ORAL 2 TIMES DAILY PRN
Qty: 28 TABLET | Refills: 0 | Status: SHIPPED | OUTPATIENT
Start: 2019-03-18 | End: 2020-01-25

## 2019-03-18 RX ORDER — ONDANSETRON 4 MG/1
4 TABLET, FILM COATED ORAL EVERY 8 HOURS PRN
Qty: 20 TABLET | Refills: 0 | Status: SHIPPED | OUTPATIENT
Start: 2019-03-18 | End: 2019-06-28 | Stop reason: ALTCHOICE

## 2019-03-18 NOTE — PROGRESS NOTES
Catrachita Mcdonald is a 29year old female.   Patient presents with:  Nausea  Weight Loss: decreased appetite   Headache: x 2month  Stress: since grandfather passing     HPI:   C/o feeling fatigued, nauseated, stressed, racing thoughts, insomnia, weight loss, lab once done with prescription Disp: 12 capsule Rfl: 0   Albuterol Sulfate  (90 Base) MCG/ACT Inhalation Aero Soln Inhale 1 puff into the lungs every 6 (six) hours as needed for Wheezing.  Disp: 1 Inhaler Rfl: 1      Past Medical History:   Oliverio English b/l no W/R/R  CARDIO: RRR without murmur  GI: good BS's,no masses, HSM, distension or tenderness  EXTREMITIES: no cyanosis, clubbing or edema  MUSCULOSKELETAL: FROM, no joint swelling or bony tenderness  NEURO: a/ox3, no focal deficits  PSYCH: mood and aff

## 2019-03-19 ENCOUNTER — TELEPHONE (OUTPATIENT)
Dept: INTERNAL MEDICINE CLINIC | Facility: CLINIC | Age: 34
End: 2019-03-19

## 2019-03-19 DIAGNOSIS — G89.29 CHRONIC NECK PAIN: Primary | ICD-10-CM

## 2019-03-19 DIAGNOSIS — M54.2 CHRONIC NECK PAIN: Primary | ICD-10-CM

## 2019-03-19 NOTE — TELEPHONE ENCOUNTER
----- Message from Josie Diego PA-C sent at 3/18/2019  4:09 PM CDT -----  Please inform pt: unremarkable c-spine x-ray. Recommend MRI cervical spine d/t chronic neck pain. Order placed.

## 2019-03-19 NOTE — TELEPHONE ENCOUNTER
Relayed results and recommendations and patient agreed with plan. States MRI is scheduled for next Wednesday.

## 2019-03-27 ENCOUNTER — HOSPITAL ENCOUNTER (OUTPATIENT)
Dept: MRI IMAGING | Age: 34
Discharge: HOME | End: 2019-03-27
Attending: PHYSICIAN ASSISTANT
Payer: COMMERCIAL

## 2019-03-27 DIAGNOSIS — G89.29 CHRONIC NECK PAIN: ICD-10-CM

## 2019-03-27 DIAGNOSIS — M54.2 CHRONIC NECK PAIN: ICD-10-CM

## 2019-03-27 DIAGNOSIS — R51.9 LEFT-SIDED HEADACHE: ICD-10-CM

## 2019-03-27 PROCEDURE — A9575 INJ GADOTERATE MEGLUMI 0.1ML: HCPCS | Performed by: PHYSICIAN ASSISTANT

## 2019-03-27 PROCEDURE — 72141 MRI NECK SPINE W/O DYE: CPT | Performed by: PHYSICIAN ASSISTANT

## 2019-03-27 PROCEDURE — 70553 MRI BRAIN STEM W/O & W/DYE: CPT | Performed by: PHYSICIAN ASSISTANT

## 2019-05-01 ENCOUNTER — OFFICE VISIT (OUTPATIENT)
Dept: INTERNAL MEDICINE CLINIC | Facility: CLINIC | Age: 34
End: 2019-05-01
Payer: COMMERCIAL

## 2019-05-01 VITALS
OXYGEN SATURATION: 97 % | HEIGHT: 63 IN | SYSTOLIC BLOOD PRESSURE: 116 MMHG | RESPIRATION RATE: 16 BRPM | DIASTOLIC BLOOD PRESSURE: 72 MMHG | BODY MASS INDEX: 20.73 KG/M2 | HEART RATE: 70 BPM | WEIGHT: 117 LBS | TEMPERATURE: 99 F

## 2019-05-01 DIAGNOSIS — B96.89 BACTERIAL SKIN INFECTION: Primary | ICD-10-CM

## 2019-05-01 DIAGNOSIS — L08.9 BACTERIAL SKIN INFECTION: Primary | ICD-10-CM

## 2019-05-01 PROCEDURE — 99213 OFFICE O/P EST LOW 20 MIN: CPT | Performed by: NURSE PRACTITIONER

## 2019-05-01 RX ORDER — CEPHALEXIN 500 MG/1
500 CAPSULE ORAL 2 TIMES DAILY
Qty: 14 CAPSULE | Refills: 0 | Status: SHIPPED | OUTPATIENT
Start: 2019-05-01 | End: 2019-05-13

## 2019-05-01 NOTE — PROGRESS NOTES
HPI:    Patient ID: Jarred Ortiz is a 29year old female.     Patient presents with:  Rash: rash on face--itches and burns, pt using antifungal cream on it      Patient had traveled to [place] and developed a circular rash to right cheek on side of mouth • Hypertension Maternal Grandfather    • Cancer Maternal Grandfather         lung   • High Cholesterol Paternal Grandmother    • Heart Disorder Father    • Cancer Maternal Aunt 48        colon ca     Social History    Socioeconomic History      Marital sta Component Value Date    GLU 83 02/20/2019    BUN 13 02/20/2019    BUNCREA 16.5 02/20/2019    CREATSERUM 0.79 02/20/2019    ANIONGAP 5 02/20/2019     01/24/2018    GFRNAA 99 02/20/2019    GFRAA 115 02/20/2019    CA 8.9 02/20/2019    [address] 283 02/2 Cardiovascular: Normal rate, regular rhythm, normal heart sounds and intact distal pulses. No murmur heard. Edema not present. Pulmonary/Chest: Effort normal and breath sounds normal. No respiratory distress. Abdominal: Soft.  Bowel sounds are tien

## 2019-05-13 ENCOUNTER — OFFICE VISIT (OUTPATIENT)
Dept: INTERNAL MEDICINE CLINIC | Facility: CLINIC | Age: 34
End: 2019-05-13
Payer: COMMERCIAL

## 2019-05-13 VITALS
WEIGHT: 117 LBS | RESPIRATION RATE: 16 BRPM | SYSTOLIC BLOOD PRESSURE: 118 MMHG | DIASTOLIC BLOOD PRESSURE: 68 MMHG | OXYGEN SATURATION: 99 % | BODY MASS INDEX: 20.73 KG/M2 | HEART RATE: 72 BPM | TEMPERATURE: 98 F | HEIGHT: 63 IN

## 2019-05-13 DIAGNOSIS — B36.9 FUNGAL INFECTION OF SKIN: Primary | ICD-10-CM

## 2019-05-13 PROCEDURE — 99213 OFFICE O/P EST LOW 20 MIN: CPT | Performed by: NURSE PRACTITIONER

## 2019-05-13 NOTE — PROGRESS NOTES
HPI:    Patient ID: Catrachita Mcdonald is a 29year old female. Patient presents with:  Bite Sting,Insect (integumentary): bug bite on face right side of mouth, pt seen 05/01 and prescribed Keflex      Patient was seen 2 weeks ago for rash to right cheek. • Heart Disorder Father    • Cancer Maternal Aunt 48        colon ca     Social History    Socioeconomic History      Marital status:       Spouse name: Not on file      Number of children: 2      Years of education: Not on file      Highest educati GLOBULIN 4.2 02/20/2019    AGRATIO 1.5 09/03/2013     02/20/2019    K 4.0 02/20/2019     02/20/2019    CO2 27.0 02/20/2019     Lab Results   Component Value Date    WBC 4.1 02/20/2019    RBC 4.76 02/20/2019    HGB 13.4 02/20/2019    HCT 40.5 0 Lamisil- apply twice a day        Milly Dick, APRN

## 2019-05-17 ENCOUNTER — APPOINTMENT (OUTPATIENT)
Dept: LAB | Age: 34
End: 2019-05-17
Attending: INTERNAL MEDICINE
Payer: COMMERCIAL

## 2019-05-17 DIAGNOSIS — E55.9 VITAMIN D DEFICIENCY: ICD-10-CM

## 2019-05-17 PROCEDURE — 36415 COLL VENOUS BLD VENIPUNCTURE: CPT | Performed by: INTERNAL MEDICINE

## 2019-05-17 PROCEDURE — 82306 VITAMIN D 25 HYDROXY: CPT | Performed by: INTERNAL MEDICINE

## 2019-06-28 ENCOUNTER — OFFICE VISIT (OUTPATIENT)
Dept: INTERNAL MEDICINE CLINIC | Facility: CLINIC | Age: 34
End: 2019-06-28
Payer: COMMERCIAL

## 2019-06-28 VITALS
BODY MASS INDEX: 21.62 KG/M2 | WEIGHT: 122 LBS | DIASTOLIC BLOOD PRESSURE: 72 MMHG | RESPIRATION RATE: 16 BRPM | SYSTOLIC BLOOD PRESSURE: 102 MMHG | HEART RATE: 79 BPM | TEMPERATURE: 98 F | HEIGHT: 63 IN

## 2019-06-28 DIAGNOSIS — S23.41XA SPRAIN OF COSTAL CARTILAGE, INITIAL ENCOUNTER: ICD-10-CM

## 2019-06-28 DIAGNOSIS — S66.912A MUSCLE STRAIN OF LEFT WRIST, INITIAL ENCOUNTER: Primary | ICD-10-CM

## 2019-06-28 PROCEDURE — 99214 OFFICE O/P EST MOD 30 MIN: CPT | Performed by: INTERNAL MEDICINE

## 2019-06-28 NOTE — PATIENT INSTRUCTIONS
Self-Care for Strains and Sprains  Most minor strains and sprains can be treated with self-care. Recovering from a strain or sprain may take 6 to 8 weeks. Your self-care goal is to reduce pain and immobilize the injury to speed healing.      A sprain inju joint or limb appears bent or crooked. · Pain increases or doesn’t improve in 4 days  · When pressing along the injured area, you notice a spot that is especially painful  Date Last Reviewed: 5/1/2018  © 2232-1724 [employer] [address]  [address] [facility]

## 2019-06-28 NOTE — PROGRESS NOTES
HPI:    Patient ID: Drea Mendoza is a 29year old female. HPI  29year old female c/o left outer wrist pain. She says she notices the pain more when she is sleeping.  She got a brace and she does not wear it all the time but when she wears the brace w Wheezing.  Disp: 1 Inhaler Rfl: 1     Allergies:  Azithromycin            DIARRHEA    Comment:Stomach upset  Bactrim [Sulfametho*    DIARRHEA    Comment:Stomach upset   PHYSICAL EXAM:   Physical Exam   Constitutional: She appears well-developed and well-nou

## 2019-07-08 ENCOUNTER — OFFICE VISIT (OUTPATIENT)
Dept: OBGYN CLINIC | Facility: CLINIC | Age: 34
End: 2019-07-08
Payer: COMMERCIAL

## 2019-07-08 VITALS
HEIGHT: 63 IN | DIASTOLIC BLOOD PRESSURE: 80 MMHG | WEIGHT: 122 LBS | BODY MASS INDEX: 21.62 KG/M2 | SYSTOLIC BLOOD PRESSURE: 132 MMHG

## 2019-07-08 DIAGNOSIS — N63.0 LUMP OR MASS IN BREAST: Primary | ICD-10-CM

## 2019-07-08 PROCEDURE — 99213 OFFICE O/P EST LOW 20 MIN: CPT | Performed by: OBSTETRICS & GYNECOLOGY

## 2019-07-08 NOTE — PROGRESS NOTES
OB/GYN H&P     2019  2:27 PM    CC: Patient presents with:   Other: PT states right breast lump       HPI: Sully Winters is a 29year old  here for lump in her right breast.   \"it felt harder\"   Patient stated that she always felt size discre Outpatient Medications on File Prior to Visit:  ALPRAZolam (XANAX) 0.5 MG Oral Tab Take 1 tablet (0.5 mg total) by mouth 2 (two) times daily as needed for Anxiety.  Disp: 28 tablet Rfl: 0   Albuterol Sulfate  (90 Base) MCG/ACT Inhalation Aero Soln In mass in breast    -  Primary    Relevant Orders    US BREAST RIGHT COMPLETE (IJP=[id number])             Ousmane Hughes MD

## 2019-07-10 ENCOUNTER — TELEPHONE (OUTPATIENT)
Dept: OBGYN CLINIC | Facility: CLINIC | Age: 34
End: 2019-07-10

## 2019-07-10 DIAGNOSIS — N63.0 LUMP OR MASS IN BREAST: Primary | ICD-10-CM

## 2019-07-10 NOTE — TELEPHONE ENCOUNTER
Call from [facility] in Mammography. Per radiologist, any patient over the age of 32 needs a bilateral diagnostic mammogram to accompany the breast US when a breast lump is found. Routed to Dr. Niecy Fonseca.  Please advise if OK for mammogram order.

## 2019-07-11 ENCOUNTER — TELEPHONE (OUTPATIENT)
Dept: INTERNAL MEDICINE CLINIC | Facility: CLINIC | Age: 34
End: 2019-07-11

## 2019-07-11 NOTE — TELEPHONE ENCOUNTER
PT is calling about continuing stomach pains. She saw Dr. Elena Vo about a week ago and Mary is not working. She would like an appt and to talk to the nurse. Please advise.

## 2019-07-11 NOTE — TELEPHONE ENCOUNTER
The pt would like to schedule a FU for LUQ pain on 6/28/2019. The pt has been taking aleve and the pain is still present. The pain radiates from the stomach to the back. The pt denies any urinary symptoms including hematuria.      An appointment was eduin

## 2019-07-12 ENCOUNTER — OFFICE VISIT (OUTPATIENT)
Dept: INTERNAL MEDICINE CLINIC | Facility: CLINIC | Age: 34
End: 2019-07-12
Payer: COMMERCIAL

## 2019-07-12 VITALS
DIASTOLIC BLOOD PRESSURE: 68 MMHG | HEART RATE: 80 BPM | TEMPERATURE: 99 F | RESPIRATION RATE: 16 BRPM | HEIGHT: 63 IN | WEIGHT: 121 LBS | BODY MASS INDEX: 21.44 KG/M2 | SYSTOLIC BLOOD PRESSURE: 110 MMHG

## 2019-07-12 DIAGNOSIS — R10.9 ABDOMINAL PAIN, UNSPECIFIED ABDOMINAL LOCATION: Primary | ICD-10-CM

## 2019-07-12 LAB
APPEARANCE: CLEAR
MULTISTIX LOT#: NORMAL NUMERIC
PH, URINE: 7 (ref 4.5–8)
SPECIFIC GRAVITY: 1.02 (ref 1–1.03)
URINE-COLOR: YELLOW
UROBILINOGEN,SEMI-QN: 0.2 MG/DL (ref 0–1.9)

## 2019-07-12 PROCEDURE — 81003 URINALYSIS AUTO W/O SCOPE: CPT | Performed by: PHYSICIAN ASSISTANT

## 2019-07-12 PROCEDURE — 99213 OFFICE O/P EST LOW 20 MIN: CPT | Performed by: PHYSICIAN ASSISTANT

## 2019-07-12 RX ORDER — PANTOPRAZOLE SODIUM 40 MG/1
40 TABLET, DELAYED RELEASE ORAL
Qty: 14 TABLET | Refills: 0 | Status: SHIPPED | OUTPATIENT
Start: 2019-07-12 | End: 2019-07-26

## 2019-07-12 NOTE — PROGRESS NOTES
HPI:    Patient ID: Margaret Elizalde is a 29year old female. Patient presents with:  Abdominal Pain: still present now having nausea   Low Back Pain     Abdominal Pain   This is a new problem. The current episode started 1 to 4 weeks ago.  The onset qualit DIARRHEA    Comment:Stomach upset  Bactrim [Sulfametho*    DIARRHEA    Comment:Stomach upset   PHYSICAL EXAM:   Physical Exam   Nursing note and vitals reviewed. Constitutional: She is oriented to person, place, and time.  She appears well-developed Signed Prescriptions Disp Refills   • Pantoprazole Sodium 40 MG Oral Tab EC 14 tablet 0     Sig: Take 1 tablet (40 mg total) by mouth every morning before breakfast for 14 days.        Imaging & Referrals:  None         PA#[id number]

## 2019-07-25 ENCOUNTER — HOSPITAL ENCOUNTER (OUTPATIENT)
Dept: ULTRASOUND IMAGING | Age: 34
Discharge: HOME | End: 2019-07-25
Attending: PHYSICIAN ASSISTANT
Payer: COMMERCIAL

## 2019-07-25 DIAGNOSIS — R10.84 GENERALIZED ABDOMINAL PAIN: ICD-10-CM

## 2019-07-25 DIAGNOSIS — R19.8 ABDOMINAL FULLNESS: ICD-10-CM

## 2019-07-25 PROCEDURE — 76700 US EXAM ABDOM COMPLETE: CPT | Performed by: PHYSICIAN ASSISTANT

## 2019-07-26 ENCOUNTER — HOSPITAL ENCOUNTER (OUTPATIENT)
Dept: MAMMOGRAPHY | Facility: HOSPITAL | Age: 34
Discharge: HOME | End: 2019-07-26
Attending: OBSTETRICS & GYNECOLOGY
Payer: COMMERCIAL

## 2019-07-26 DIAGNOSIS — N63.0 LUMP OR MASS IN BREAST: ICD-10-CM

## 2019-07-26 PROCEDURE — 77066 DX MAMMO INCL CAD BI: CPT | Performed by: OBSTETRICS & GYNECOLOGY

## 2019-07-26 PROCEDURE — 76641 ULTRASOUND BREAST COMPLETE: CPT | Performed by: OBSTETRICS & GYNECOLOGY

## 2019-07-26 PROCEDURE — 77062 BREAST TOMOSYNTHESIS BI: CPT | Performed by: OBSTETRICS & GYNECOLOGY

## 2019-07-30 ENCOUNTER — HOSPITAL ENCOUNTER (OUTPATIENT)
Dept: CT IMAGING | Facility: HOSPITAL | Age: 34
Discharge: HOME | End: 2019-07-30
Attending: NURSE PRACTITIONER
Payer: COMMERCIAL

## 2019-07-30 ENCOUNTER — OFFICE VISIT (OUTPATIENT)
Dept: INTERNAL MEDICINE CLINIC | Facility: CLINIC | Age: 34
End: 2019-07-30
Payer: COMMERCIAL

## 2019-07-30 ENCOUNTER — TELEPHONE (OUTPATIENT)
Dept: INTERNAL MEDICINE CLINIC | Facility: CLINIC | Age: 34
End: 2019-07-30

## 2019-07-30 VITALS
HEART RATE: 86 BPM | TEMPERATURE: 98 F | BODY MASS INDEX: 21.44 KG/M2 | HEIGHT: 63 IN | DIASTOLIC BLOOD PRESSURE: 68 MMHG | SYSTOLIC BLOOD PRESSURE: 114 MMHG | RESPIRATION RATE: 16 BRPM | OXYGEN SATURATION: 99 % | WEIGHT: 121 LBS

## 2019-07-30 DIAGNOSIS — R10.32 PAIN, ABDOMINAL, LLQ: Primary | ICD-10-CM

## 2019-07-30 DIAGNOSIS — R10.32 PAIN, ABDOMINAL, LLQ: ICD-10-CM

## 2019-07-30 LAB — CREAT BLD-MCNC: 0.7 MG/DL (ref 0.55–1.02)

## 2019-07-30 PROCEDURE — 99213 OFFICE O/P EST LOW 20 MIN: CPT | Performed by: NURSE PRACTITIONER

## 2019-07-30 PROCEDURE — 74177 CT ABD & PELVIS W/CONTRAST: CPT | Performed by: NURSE PRACTITIONER

## 2019-07-30 PROCEDURE — 82565 ASSAY OF CREATININE: CPT

## 2019-07-30 NOTE — PROGRESS NOTES
HPI:    Patient ID: Drea Mendoza is a 29year old female. Patient presents with:  Blood In Stool: x2-3 days blood in stool, abdominal pains x2 months--ultrasound normal per pt      Patient has had intermittent abdominal pain for over 1 month.  US of a Karishma Jacobs MD at [facility] OR   • SPECIAL SERVICE OR REPORT      warts removed on rt foot   • TUBAL LIGATION       Family History   Problem Relation Age of Onset   • Heart Disorder Paternal Grandfather    • High Cholesterol Paternal Grandfather    • Ot CA 8.9 02/20/2019    OSMOCALC 283 02/20/2019    ALKPHO 55 02/20/2019    AST 11 (L) 02/20/2019    ALT 17 02/20/2019    BILT 0.5 02/20/2019    TP 8.4 (H) 02/20/2019    ALB 4.2 02/20/2019    GLOBULIN 4.2 02/20/2019    AGRATIO 1.5 09/03/2013     02/20/20 and dry. No pallor. Psychiatric: She has a normal mood and affect. ASSESSMENT/PLAN:   Diagnoses and all orders for this visit:    Pain, abdominal, LLQ  -     CT ABDOMEN+PELVIS(CONTRAST ONLY)(CPT=74177);  Future    STAT CT to r/o diverticulitis

## 2019-07-30 NOTE — TELEPHONE ENCOUNTER
CONCLUSION:    1. Mild wall thickening of jejunal loops noted within the left upper quadrant. This could be secondary to incomplete distension but a mild enteritis cannot be excluded. 2. Small amount of free pelvic fluid likely physiological in nature.

## 2019-08-06 ENCOUNTER — OFFICE VISIT (OUTPATIENT)
Dept: GASTROENTEROLOGY | Facility: CLINIC | Age: 34
End: 2019-08-06
Payer: COMMERCIAL

## 2019-08-06 VITALS
HEART RATE: 73 BPM | WEIGHT: 122 LBS | BODY MASS INDEX: 21.62 KG/M2 | SYSTOLIC BLOOD PRESSURE: 118 MMHG | HEIGHT: 63 IN | DIASTOLIC BLOOD PRESSURE: 79 MMHG

## 2019-08-06 DIAGNOSIS — K62.5 RECTAL BLEEDING: ICD-10-CM

## 2019-08-06 DIAGNOSIS — R10.9 LEFT SIDED ABDOMINAL PAIN: Primary | ICD-10-CM

## 2019-08-06 PROCEDURE — 99214 OFFICE O/P EST MOD 30 MIN: CPT | Performed by: NURSE PRACTITIONER

## 2019-08-06 RX ORDER — DICYCLOMINE HYDROCHLORIDE 10 MG/1
10 CAPSULE ORAL 2 TIMES DAILY PRN
Qty: 60 CAPSULE | Refills: 1 | Status: SHIPPED | OUTPATIENT
Start: 2019-08-06 | End: 2020-01-19 | Stop reason: ALTCHOICE

## 2019-08-06 NOTE — PATIENT INSTRUCTIONS
1.  Avoid known dietary/lifestyle triggers  2. Start dicyclomine as needed for abdominal pain-it can cause constipation  3. Restart probiotics  4. Consider FODMAP diet (http://mccabe.com/)  5.   Consider MiraLAX as nee

## 2019-08-06 NOTE — PROGRESS NOTES
[facility] Follow-up Visit    Triny diarrheal.  Denies overt straining.    + Nausea without vomiting or hematemesis. No acute chest pain or shortness of breath. Appetite and weight are stable. No dysphagia or odynophagia.           Pertinent Surgical Hx:  /tubal ligation  - Se Disorder Paternal Grandfather    • High Cholesterol Paternal Grandfather    • Other (Other[other]) Maternal Grandmother         osteoarthritis    • Hypertension Maternal Grandfather    • Cancer Maternal Grandfather         lung   • High Cholesterol Paterna distress  HEENT: conjunctiva pink, the sclera appears anicteric, oropharynx clear, mucus membranes appear moist  CV: regular rate and rhythm, the extremities are warm and well perfused   Lung: effort normal and breath sounds normal, no respiratory distress history of hemorrhoids which certainly could have prompted the 2 episodes of bleeding she reported 1 week ago. This is since resolved and not recurred. There is a questionable episode of hematochezia versus rectal bleeding.   We discussed that this could

## 2019-09-03 NOTE — PROGRESS NOTES
[facility] Follow-up Visit    Triny weight are stable.             Pertinent Surgical Hx:  /tubal ligation  - See additional surgical hx below  - No known issues with sedation.  - No known history of sleep apnea.     Pertinent Family Hx:  + Colon cancer, maternal aunt (diagnosed age Grandfather    • Cancer Maternal Grandfather         lung   • High Cholesterol Paternal Grandmother    • Heart Disorder Father    • Cancer Maternal Aunt 48        colon ca      Social History: Social History    Tobacco Use      Smoking status: Never Smoker and breath sounds normal, no respiratory distress, wheezes or rales  GI: soft, non-tender exam in all quadrants without rigidity or guarding, non-distended, no abnormal bowel sounds noted, no masses are palpated  : no CVA tenderness  Skin: dry, warm, no APN  9/10/2019

## 2019-09-10 ENCOUNTER — OFFICE VISIT (OUTPATIENT)
Dept: GASTROENTEROLOGY | Facility: CLINIC | Age: 34
End: 2019-09-10
Payer: COMMERCIAL

## 2019-09-10 VITALS
WEIGHT: 129 LBS | HEART RATE: 76 BPM | BODY MASS INDEX: 22.86 KG/M2 | DIASTOLIC BLOOD PRESSURE: 77 MMHG | SYSTOLIC BLOOD PRESSURE: 117 MMHG | HEIGHT: 63 IN

## 2019-09-10 DIAGNOSIS — R10.9 LEFT SIDED ABDOMINAL PAIN: Primary | ICD-10-CM

## 2019-09-10 DIAGNOSIS — K62.5 RECTAL BLEEDING: ICD-10-CM

## 2019-09-10 PROCEDURE — 99213 OFFICE O/P EST LOW 20 MIN: CPT | Performed by: NURSE PRACTITIONER

## 2019-09-10 NOTE — PATIENT INSTRUCTIONS
1.  Continue seeing the chiropractor for your back as needed  2. Continue bowel measures including dietary modifications, dicyclomine as needed, probiotics  3.   May continue MiraLAX and/or fiber supplements as needed to maintain regular bowel movements  4

## 2019-09-24 ENCOUNTER — HOSPITAL ENCOUNTER (OUTPATIENT)
Dept: GENERAL RADIOLOGY | Age: 34
Discharge: HOME | End: 2019-09-24
Attending: PHYSICIAN ASSISTANT
Payer: COMMERCIAL

## 2019-09-24 ENCOUNTER — OFFICE VISIT (OUTPATIENT)
Dept: INTERNAL MEDICINE CLINIC | Facility: CLINIC | Age: 34
End: 2019-09-24
Payer: COMMERCIAL

## 2019-09-24 VITALS
BODY MASS INDEX: 22.86 KG/M2 | DIASTOLIC BLOOD PRESSURE: 70 MMHG | OXYGEN SATURATION: 98 % | RESPIRATION RATE: 16 BRPM | TEMPERATURE: 98 F | HEIGHT: 63 IN | SYSTOLIC BLOOD PRESSURE: 114 MMHG | WEIGHT: 129 LBS | HEART RATE: 78 BPM

## 2019-09-24 DIAGNOSIS — M25.572 ACUTE LEFT ANKLE PAIN: ICD-10-CM

## 2019-09-24 DIAGNOSIS — Z23 NEED FOR INFLUENZA VACCINATION: ICD-10-CM

## 2019-09-24 DIAGNOSIS — M25.572 ACUTE LEFT ANKLE PAIN: Primary | ICD-10-CM

## 2019-09-24 PROCEDURE — 90471 IMMUNIZATION ADMIN: CPT | Performed by: PHYSICIAN ASSISTANT

## 2019-09-24 PROCEDURE — 73610 X-RAY EXAM OF ANKLE: CPT | Performed by: PHYSICIAN ASSISTANT

## 2019-09-24 PROCEDURE — 99213 OFFICE O/P EST LOW 20 MIN: CPT | Performed by: PHYSICIAN ASSISTANT

## 2019-09-24 PROCEDURE — 90686 IIV4 VACC NO PRSV 0.5 ML IM: CPT | Performed by: PHYSICIAN ASSISTANT

## 2019-09-24 NOTE — PROGRESS NOTES
HPI:    Patient ID: Marliss Hodgkin is a 36year old female. Patient presents with: Ankle Pain: left ankle pain, child smashed ankle while running at pt., also was playing volley ball without shoes and twisted it.      Ankle Pain    Incident onset: over a Cardiovascular: Normal rate, regular rhythm and normal heart sounds. No murmur heard. Pulmonary/Chest: Effort normal and breath sounds normal. She has no wheezes. She has no rales.    Musculoskeletal:        Left ankle: She exhibits normal range of mo

## 2019-09-24 NOTE — PATIENT INSTRUCTIONS
start diclofenac gel up to 4 times daily as needed for ankle pain      Bent-Knee Calf Stretch    This exercise is designed to stretch and strengthen your feet and ankles. Before beginning the exercise, read through all the instructions.  While exercising, b 1. Sit on the floor or in bed with your legs straight in front of you. 2. Point both feet. Then flex both feet. 3. Do this 10 to 30 times in a row. 4. Repeat this exercise 2 times a day, or as instructed.   Date Last Reviewed: 5/1/2016  © 2076-2886 The [employer]

## 2020-01-19 ENCOUNTER — HOSPITAL ENCOUNTER (OUTPATIENT)
Age: 35
Discharge: HOME | End: 2020-01-19
Payer: COMMERCIAL

## 2020-01-19 ENCOUNTER — APPOINTMENT (OUTPATIENT)
Dept: GENERAL RADIOLOGY | Age: 35
End: 2020-01-19
Attending: PHYSICIAN ASSISTANT
Payer: COMMERCIAL

## 2020-01-19 VITALS
SYSTOLIC BLOOD PRESSURE: 113 MMHG | RESPIRATION RATE: 18 BRPM | DIASTOLIC BLOOD PRESSURE: 83 MMHG | TEMPERATURE: 98 F | OXYGEN SATURATION: 99 % | HEART RATE: 89 BPM

## 2020-01-19 DIAGNOSIS — J20.9 ACUTE BRONCHITIS, UNSPECIFIED ORGANISM: Primary | ICD-10-CM

## 2020-01-19 PROCEDURE — 71046 X-RAY EXAM CHEST 2 VIEWS: CPT | Performed by: PHYSICIAN ASSISTANT

## 2020-01-19 PROCEDURE — 99214 OFFICE O/P EST MOD 30 MIN: CPT

## 2020-01-19 PROCEDURE — 94640 AIRWAY INHALATION TREATMENT: CPT

## 2020-01-19 RX ORDER — ALBUTEROL SULFATE 2.5 MG/3ML
2.5 SOLUTION RESPIRATORY (INHALATION) ONCE
Status: COMPLETED | OUTPATIENT
Start: 2020-01-19 | End: 2020-01-19

## 2020-01-19 RX ORDER — DEXAMETHASONE 4 MG/1
16 TABLET ORAL ONCE
Status: COMPLETED | OUTPATIENT
Start: 2020-01-19 | End: 2020-01-19

## 2020-01-19 RX ORDER — ALBUTEROL SULFATE 90 UG/1
2 AEROSOL, METERED RESPIRATORY (INHALATION) EVERY 4 HOURS PRN
Qty: 1 INHALER | Refills: 0 | Status: SHIPPED | OUTPATIENT
Start: 2020-01-19 | End: 2020-01-25

## 2020-01-19 RX ORDER — BENZONATATE 200 MG/1
200 CAPSULE ORAL 3 TIMES DAILY PRN
Qty: 30 CAPSULE | Refills: 0 | Status: SHIPPED | OUTPATIENT
Start: 2020-01-19 | End: 2020-02-10

## 2020-01-19 NOTE — ED PROVIDER NOTES
Patient Seen in: [facility]      History   Patient presents with:  Cough    Stated Complaint: COUGH X 1 WK    HPI    Asa Najera is a 78-year-old female who presents today for evaluation of cough x1 week.   She has a past medical histo Drug use: No             Review of Systems    Positive for stated complaint: COUGH X 1 WK  Other systems are as noted in HPI. Constitutional and vital signs reviewed. All other systems reviewed and negative except as noted above.     Physical Exam consolidation. Normal vascularity. CARDIAC:  Normal size cardiac silhouette. MEDIASTINUM:  Normal. PLEURA:  No pneumothorax. No pleural effusions. BONES:  Normal for age.       CONCLUSION:  Negative chest.    Dictated by: Dolores Gabriel MD on 1/19/2020 at

## 2020-01-25 ENCOUNTER — OFFICE VISIT (OUTPATIENT)
Dept: INTERNAL MEDICINE CLINIC | Facility: CLINIC | Age: 35
End: 2020-01-25
Payer: COMMERCIAL

## 2020-01-25 VITALS
SYSTOLIC BLOOD PRESSURE: 114 MMHG | BODY MASS INDEX: 21.26 KG/M2 | HEART RATE: 88 BPM | RESPIRATION RATE: 16 BRPM | DIASTOLIC BLOOD PRESSURE: 64 MMHG | WEIGHT: 120 LBS | OXYGEN SATURATION: 99 % | TEMPERATURE: 98 F | HEIGHT: 63 IN

## 2020-01-25 DIAGNOSIS — F41.9 ANXIETY: ICD-10-CM

## 2020-01-25 DIAGNOSIS — J22 ACUTE LOWER RESPIRATORY INFECTION: Primary | ICD-10-CM

## 2020-01-25 PROCEDURE — 99214 OFFICE O/P EST MOD 30 MIN: CPT | Performed by: PHYSICIAN ASSISTANT

## 2020-01-25 RX ORDER — AMOXICILLIN AND CLAVULANATE POTASSIUM 875; 125 MG/1; MG/1
1 TABLET, FILM COATED ORAL 2 TIMES DAILY
Qty: 14 TABLET | Refills: 0 | Status: SHIPPED | OUTPATIENT
Start: 2020-01-25 | End: 2020-02-01

## 2020-01-25 RX ORDER — PREDNISONE 20 MG/1
40 TABLET ORAL DAILY
Qty: 14 TABLET | Refills: 0 | Status: SHIPPED | OUTPATIENT
Start: 2020-01-25 | End: 2020-02-01

## 2020-01-25 RX ORDER — ALPRAZOLAM 0.5 MG/1
0.5 TABLET ORAL 2 TIMES DAILY PRN
Qty: 28 TABLET | Refills: 0 | Status: SHIPPED | OUTPATIENT
Start: 2020-01-25 | End: 2021-05-14

## 2020-01-25 NOTE — PROGRESS NOTES
HPI:   Vish Ornelas is a 36year old female who presents for upper respiratory symptoms for  2  weeks. Patient reports cough, runny nose, chest congestion, chest tightness while coughing, irritation in throat.  Patient denies sob, wheezing, sinus pressur LIGATION        Family History   Problem Relation Age of Onset   • Heart Disorder Paternal Grandfather    • High Cholesterol Paternal Grandfather    • Other (Other[other]) Maternal Grandmother         osteoarthritis    • Hypertension Maternal Grandfather otc flonase bid. Continue xanax prn for anxiety    Requested Prescriptions     Signed Prescriptions Disp Refills   • predniSONE 20 MG Oral Tab 14 tablet 0     Sig: Take 2 tablets (40 mg total) by mouth daily for 7 days.    • ALPRAZolam (XANAX) 0.5 MG Oral

## 2020-01-25 NOTE — PATIENT INSTRUCTIONS
flonase 1 spray to each nostril   Take antibiotic as directed until completely finished. Contact us if you experience any adverse reaction to the medication.     Take prednisone as directed, with food  Drink plenty of fluids  Take albuterol as needed for thania

## 2020-02-10 ENCOUNTER — OFFICE VISIT (OUTPATIENT)
Dept: OBGYN CLINIC | Facility: CLINIC | Age: 35
End: 2020-02-10
Payer: COMMERCIAL

## 2020-02-10 VITALS
WEIGHT: 123.19 LBS | DIASTOLIC BLOOD PRESSURE: 72 MMHG | SYSTOLIC BLOOD PRESSURE: 114 MMHG | HEART RATE: 106 BPM | HEIGHT: 63 IN | BODY MASS INDEX: 21.83 KG/M2

## 2020-02-10 DIAGNOSIS — Z01.419 WELL WOMAN EXAM WITH ROUTINE GYNECOLOGICAL EXAM: Primary | ICD-10-CM

## 2020-02-10 PROCEDURE — 99395 PREV VISIT EST AGE 18-39: CPT | Performed by: OBSTETRICS & GYNECOLOGY

## 2020-02-10 PROCEDURE — 87624 HPV HI-RISK TYP POOLED RSLT: CPT | Performed by: OBSTETRICS & GYNECOLOGY

## 2020-02-10 NOTE — PROGRESS NOTES
OB/GYN H&P     2/10/2020  4:22 PM    CC: Patient presents with: Annual: Annual px      HPI: Margaret Elizalde is a 36year old  here for WWE. Doing well. Periods are regular. Patient's last menstrual period was 2020 (exact date).     OB DIARRHEA    Comment:Stomach upset  ALPRAZolam (XANAX) 0.5 MG Oral Tab, Take 1 tablet (0.5 mg total) by mouth 2 (two) times daily as needed for Anxiety. , Disp: 28 tablet, Rfl: 0  Albuterol Sulfate  (90 Base) MCG/ACT Inhalation Aero Soln, Inhale 1 puf no tenderness. Left adnexum displays no mass and no tenderness. Neurological: She is alert and oriented to person, place, and time. Skin: Skin is warm and dry. Psychiatric: She has a normal mood and affect.  Her behavior is normal.         Assessment/

## 2020-02-11 ENCOUNTER — OFFICE VISIT (OUTPATIENT)
Dept: INTERNAL MEDICINE CLINIC | Facility: CLINIC | Age: 35
End: 2020-02-11
Payer: COMMERCIAL

## 2020-02-11 VITALS
DIASTOLIC BLOOD PRESSURE: 70 MMHG | SYSTOLIC BLOOD PRESSURE: 102 MMHG | BODY MASS INDEX: 21.97 KG/M2 | WEIGHT: 124 LBS | HEIGHT: 63 IN | RESPIRATION RATE: 20 BRPM | OXYGEN SATURATION: 98 % | HEART RATE: 88 BPM | TEMPERATURE: 98 F

## 2020-02-11 DIAGNOSIS — S29.011A INTERCOSTAL MUSCLE STRAIN, INITIAL ENCOUNTER: Primary | ICD-10-CM

## 2020-02-11 PROCEDURE — 99213 OFFICE O/P EST LOW 20 MIN: CPT | Performed by: PHYSICIAN ASSISTANT

## 2020-02-11 RX ORDER — CYCLOBENZAPRINE HCL 10 MG
10 TABLET ORAL NIGHTLY
Qty: 7 TABLET | Refills: 0 | Status: SHIPPED | OUTPATIENT
Start: 2020-02-11 | End: 2020-03-02

## 2020-02-11 NOTE — PROGRESS NOTES
HPI:    Patient ID: Fredo Chua is a 36year old female.   Patient presents with:  Pain: x2 wks ago \"heard a pop and felt pain\" Right side rib, hurts with cough, feels tightness right rib when taking a deep breath, pain when lifting right arm      Rig has no rales. She exhibits no mass, no tenderness, no deformity and no retraction. Neurological: She is alert and oriented to person, place, and time. Skin: Skin is warm, dry and intact. No rash noted. She is not diaphoretic. No erythema. No pallor.

## 2020-02-12 LAB — HPV I/H RISK 1 DNA SPEC QL NAA+PROBE: NEGATIVE

## 2020-02-28 ENCOUNTER — LAB ENCOUNTER (OUTPATIENT)
Dept: LAB | Age: 35
End: 2020-02-28
Attending: INTERNAL MEDICINE
Payer: COMMERCIAL

## 2020-02-28 DIAGNOSIS — Z00.00 ANNUAL PHYSICAL EXAM: ICD-10-CM

## 2020-02-28 LAB
ALBUMIN SERPL-MCNC: 3.9 G/DL (ref 3.4–5)
ALBUMIN/GLOB SERPL: 0.9 {RATIO} (ref 1–2)
ALP LIVER SERPL-CCNC: 61 U/L (ref 37–98)
ALT SERPL-CCNC: 20 U/L (ref 13–56)
ANION GAP SERPL CALC-SCNC: 4 MMOL/L (ref 0–18)
AST SERPL-CCNC: 19 U/L (ref 15–37)
BASOPHILS # BLD AUTO: 0.04 X10(3) UL (ref 0–0.2)
BASOPHILS NFR BLD AUTO: 1.2 %
BILIRUB SERPL-MCNC: 0.4 MG/DL (ref 0.1–2)
BILIRUB UR QL STRIP.AUTO: NEGATIVE
BUN BLD-MCNC: 13 MG/DL (ref 7–18)
BUN/CREAT SERPL: 19.1 (ref 10–20)
CALCIUM BLD-MCNC: 8.7 MG/DL (ref 8.5–10.1)
CHLORIDE SERPL-SCNC: 106 MMOL/L (ref 98–112)
CHOLEST SMN-MCNC: 150 MG/DL (ref ?–200)
CLARITY UR REFRACT.AUTO: CLEAR
CO2 SERPL-SCNC: 27 MMOL/L (ref 21–32)
COLOR UR AUTO: YELLOW
CREAT BLD-MCNC: 0.68 MG/DL (ref 0.55–1.02)
DEPRECATED RDW RBC AUTO: 39.4 FL (ref 35.1–46.3)
EOSINOPHIL # BLD AUTO: 0.1 X10(3) UL (ref 0–0.7)
EOSINOPHIL NFR BLD AUTO: 3 %
ERYTHROCYTE [DISTWIDTH] IN BLOOD BY AUTOMATED COUNT: 12.9 % (ref 11–15)
EST. AVERAGE GLUCOSE BLD GHB EST-MCNC: 105 MG/DL (ref 68–126)
GLOBULIN PLAS-MCNC: 4.5 G/DL (ref 2.8–4.4)
GLUCOSE BLD-MCNC: 88 MG/DL (ref 70–99)
GLUCOSE UR STRIP.AUTO-MCNC: NEGATIVE MG/DL
HBA1C MFR BLD HPLC: 5.3 % (ref ?–5.7)
HCT VFR BLD AUTO: 38.5 % (ref 35–48)
HDLC SERPL-MCNC: 56 MG/DL (ref 40–59)
HGB BLD-MCNC: 13.1 G/DL (ref 12–16)
IMM GRANULOCYTES # BLD AUTO: 0 X10(3) UL (ref 0–1)
IMM GRANULOCYTES NFR BLD: 0 %
KETONES UR STRIP.AUTO-MCNC: NEGATIVE MG/DL
LDLC SERPL CALC-MCNC: 83 MG/DL (ref ?–100)
LEUKOCYTE ESTERASE UR QL STRIP.AUTO: NEGATIVE
LYMPHOCYTES # BLD AUTO: 1.61 X10(3) UL (ref 1–4)
LYMPHOCYTES NFR BLD AUTO: 47.6 %
M PROTEIN MFR SERPL ELPH: 8.4 G/DL (ref 6.4–8.2)
MCH RBC QN AUTO: 29 PG (ref 26–34)
MCHC RBC AUTO-ENTMCNC: 34 G/DL (ref 31–37)
MCV RBC AUTO: 85.2 FL (ref 80–100)
MONOCYTES # BLD AUTO: 0.3 X10(3) UL (ref 0.1–1)
MONOCYTES NFR BLD AUTO: 8.9 %
NEUTROPHILS # BLD AUTO: 1.33 X10 (3) UL (ref 1.5–7.7)
NEUTROPHILS # BLD AUTO: 1.33 X10(3) UL (ref 1.5–7.7)
NEUTROPHILS NFR BLD AUTO: 39.3 %
NITRITE UR QL STRIP.AUTO: NEGATIVE
NONHDLC SERPL-MCNC: 94 MG/DL (ref ?–130)
OSMOLALITY SERPL CALC.SUM OF ELEC: 284 MOSM/KG (ref 275–295)
PATIENT FASTING Y/N/NP: YES
PATIENT FASTING Y/N/NP: YES
PH UR STRIP.AUTO: 6 [PH] (ref 4.5–8)
PLATELET # BLD AUTO: 246 10(3)UL (ref 150–450)
POTASSIUM SERPL-SCNC: 3.7 MMOL/L (ref 3.5–5.1)
PROT UR STRIP.AUTO-MCNC: NEGATIVE MG/DL
RBC # BLD AUTO: 4.52 X10(6)UL (ref 3.8–5.3)
SODIUM SERPL-SCNC: 137 MMOL/L (ref 136–145)
SP GR UR STRIP.AUTO: 1.02 (ref 1–1.03)
TRIGL SERPL-MCNC: 54 MG/DL (ref 30–149)
TSI SER-ACNC: 0.96 MIU/ML (ref 0.36–3.74)
UROBILINOGEN UR STRIP.AUTO-MCNC: <2 MG/DL
VIT D+METAB SERPL-MCNC: 32 NG/ML (ref 30–100)
VLDLC SERPL CALC-MCNC: 11 MG/DL (ref 0–30)
WBC # BLD AUTO: 3.4 X10(3) UL (ref 4–11)

## 2020-02-28 PROCEDURE — 36415 COLL VENOUS BLD VENIPUNCTURE: CPT | Performed by: INTERNAL MEDICINE

## 2020-02-28 PROCEDURE — 83036 HEMOGLOBIN GLYCOSYLATED A1C: CPT | Performed by: INTERNAL MEDICINE

## 2020-02-28 PROCEDURE — 80053 COMPREHEN METABOLIC PANEL: CPT | Performed by: INTERNAL MEDICINE

## 2020-02-28 PROCEDURE — 80061 LIPID PANEL: CPT | Performed by: INTERNAL MEDICINE

## 2020-02-28 PROCEDURE — 82306 VITAMIN D 25 HYDROXY: CPT | Performed by: INTERNAL MEDICINE

## 2020-02-28 PROCEDURE — 81001 URINALYSIS AUTO W/SCOPE: CPT | Performed by: INTERNAL MEDICINE

## 2020-03-02 ENCOUNTER — OFFICE VISIT (OUTPATIENT)
Dept: INTERNAL MEDICINE CLINIC | Facility: CLINIC | Age: 35
End: 2020-03-02
Payer: COMMERCIAL

## 2020-03-02 VITALS
SYSTOLIC BLOOD PRESSURE: 118 MMHG | TEMPERATURE: 98 F | OXYGEN SATURATION: 99 % | WEIGHT: 123 LBS | DIASTOLIC BLOOD PRESSURE: 70 MMHG | BODY MASS INDEX: 21.79 KG/M2 | HEART RATE: 78 BPM | HEIGHT: 63 IN | RESPIRATION RATE: 16 BRPM

## 2020-03-02 DIAGNOSIS — Z00.00 ANNUAL PHYSICAL EXAM: Primary | ICD-10-CM

## 2020-03-02 PROCEDURE — 99395 PREV VISIT EST AGE 18-39: CPT | Performed by: INTERNAL MEDICINE

## 2020-03-02 RX ORDER — SCOLOPAMINE TRANSDERMAL SYSTEM 1 MG/1
1 PATCH, EXTENDED RELEASE TRANSDERMAL
Qty: 3 PATCH | Refills: 0 | Status: SHIPPED | OUTPATIENT
Start: 2020-03-02 | End: 2020-03-13

## 2020-03-02 NOTE — PROGRESS NOTES
HPI:    Patient ID: Fredo Chua is a 36year old female. HPI  HPI:   Fredo Chua is a 36year old female who presents for a complete physical exam. Symptoms: denies discharge, itching, burning or dysuria, periods are regular.  Patient complains o Refill   • ALPRAZolam (XANAX) 0.5 MG Oral Tab Take 1 tablet (0.5 mg total) by mouth 2 (two) times daily as needed for Anxiety.  28 tablet 0   • Albuterol Sulfate  (90 Base) MCG/ACT Inhalation Aero Soln Inhale 1 puff into the lungs every 6 (six) hours watches fats closely and watches sugar closely     REVIEW OF SYSTEMS:   GENERAL: feels well otherwise  SKIN: denies any unusual skin lesions  EYES:denies blurred vision or double vision  HEENT: denies nasal congestion, sinus pain or ST  LUNGS: denies short ALPRAZolam (XANAX) 0.5 MG Oral Tab Take 1 tablet (0.5 mg total) by mouth 2 (two) times daily as needed for Anxiety.  28 tablet 0   • Albuterol Sulfate  (90 Base) MCG/ACT Inhalation Aero Soln Inhale 1 puff into the lungs every 6 (six) hours as needed

## 2020-03-02 NOTE — PATIENT INSTRUCTIONS
Prevention Guidelines, Women Ages 25 to 44  Screening tests and vaccines are an important part of managing your health. A screening test is done to find possible disorders or diseases in people who don't have any symptoms.  The goal is to find a disease e diabetes Lifelong testing every 3 years   Type 2 diabetes All women with prediabetes Every year   Gonorrhea Sexually active women at increased risk for infection At routine exams   Hepatitis C Anyone at increased risk At routine exams   HIV All women hannah Meningococcal Women at increased risk for infection should talk with their healthcare provider 1 or more doses   Pneumococcal conjugate vaccine (PCV13) and pneumococcal polysaccharide vaccine (PPSV23) Women at increased risk for infection should talk with

## 2020-03-13 ENCOUNTER — OFFICE VISIT (OUTPATIENT)
Dept: INTERNAL MEDICINE CLINIC | Facility: CLINIC | Age: 35
End: 2020-03-13
Payer: COMMERCIAL

## 2020-03-13 VITALS
HEART RATE: 88 BPM | SYSTOLIC BLOOD PRESSURE: 114 MMHG | RESPIRATION RATE: 16 BRPM | WEIGHT: 123 LBS | TEMPERATURE: 98 F | BODY MASS INDEX: 21.79 KG/M2 | DIASTOLIC BLOOD PRESSURE: 70 MMHG | HEIGHT: 63 IN | OXYGEN SATURATION: 97 %

## 2020-03-13 DIAGNOSIS — J01.90 ACUTE BACTERIAL RHINOSINUSITIS: Primary | ICD-10-CM

## 2020-03-13 DIAGNOSIS — B96.89 ACUTE BACTERIAL RHINOSINUSITIS: Primary | ICD-10-CM

## 2020-03-13 PROCEDURE — 99213 OFFICE O/P EST LOW 20 MIN: CPT | Performed by: NURSE PRACTITIONER

## 2020-03-13 RX ORDER — AMOXICILLIN AND CLAVULANATE POTASSIUM 875; 125 MG/1; MG/1
1 TABLET, FILM COATED ORAL 2 TIMES DAILY
Qty: 20 TABLET | Refills: 0 | Status: SHIPPED | OUTPATIENT
Start: 2020-03-13 | End: 2020-03-23

## 2020-03-13 NOTE — PROGRESS NOTES
HPI:   Patient presents with symptoms of cough, congestion, PND, loss of voice for 5 days. Current treatment includes steam, flonase and OTC cold.      Current Outpatient Medications   Medication Sig Dispense Refill   • Amoxicillin-Pot Clavulanate [phone] exudate and erythema     CV: RRR no murmur  PULM: cta b/l breathing is unlabored  GI: good BS's, non tender    ASSESSMENT AND PLAN:   Acute bacterial rhinosinusitis  (primary encounter diagnosis)   Take abx as directed, take with food.  Continue flonase   R

## 2020-04-29 ENCOUNTER — PATIENT MESSAGE (OUTPATIENT)
Dept: INTERNAL MEDICINE CLINIC | Facility: CLINIC | Age: 35
End: 2020-04-29

## 2020-04-29 DIAGNOSIS — K12.0 RECURRENT APHTHOUS STOMATITIS: ICD-10-CM

## 2020-04-29 RX ORDER — DEXAMETHASONE 0.5 MG/5ML
0.5 SOLUTION ORAL 2 TIMES DAILY
Qty: 1 BOTTLE | Refills: 1 | Status: SHIPPED | OUTPATIENT
Start: 2020-04-29 | End: 2021-02-24

## 2020-04-29 NOTE — TELEPHONE ENCOUNTER
From: Margaret Elizalde  To: ARGENTINA Garza  Sent: 4/29/2020 11:36 AM CDT  Subject: Prescription Question    Hello! I was wondering if I can get a refill on my Dexamethason for my canker sores? Thank you!

## 2020-05-01 ENCOUNTER — TELEMEDICINE (OUTPATIENT)
Dept: INTERNAL MEDICINE CLINIC | Facility: CLINIC | Age: 35
End: 2020-05-01
Payer: COMMERCIAL

## 2020-05-01 DIAGNOSIS — M77.52 TENDONITIS OF ANKLE, LEFT: Primary | ICD-10-CM

## 2020-05-01 PROCEDURE — 99213 OFFICE O/P EST LOW 20 MIN: CPT | Performed by: NURSE PRACTITIONER

## 2020-05-01 RX ORDER — NAPROXEN 500 MG/1
500 TABLET ORAL 2 TIMES DAILY WITH MEALS
Qty: 28 TABLET | Refills: 0 | Status: SHIPPED | OUTPATIENT
Start: 2020-05-01 | End: 2021-02-18

## 2020-05-01 NOTE — PROGRESS NOTES
HPI:    Patient ID: Zo Burns is a 36year old female.     Patient presents with:  Leg Pain: Lower left leg pain that is tender to the touch along with left ankle pain on and off x 4wks    This visit is conducted using Telemedicine with live, interact Problem Relation Age of Onset   • Heart Disorder Paternal Grandfather    • High Cholesterol Paternal Grandfather    • Other (Other) Maternal Grandmother         osteoarthritis    • Hypertension Maternal Grandfather    • Cancer Maternal Grandfather CREATSERUM 0.68 02/28/2020    ANIONGAP 4 02/28/2020     01/24/2018    GFRNAA 115 02/28/2020    GFRAA 133 02/28/2020    CA 8.7 02/28/2020    OSMOCALC 284 02/28/2020    ALKPHO 61 02/28/2020    AST 19 02/28/2020    ALT 20 02/28/2020    BILT 0.4 02/28/

## 2020-05-04 ENCOUNTER — PATIENT MESSAGE (OUTPATIENT)
Dept: INTERNAL MEDICINE CLINIC | Facility: CLINIC | Age: 35
End: 2020-05-04

## 2020-05-04 NOTE — TELEPHONE ENCOUNTER
From: Drea Mendoza  To: ARGENTINA Trimble  Sent: 5/4/2020 4:38 PM CDT  Subject: Prescription Question    Hello,   I woke up today with a painful swollen lymph gland in my neck. It's been extremely painful and tender to the touch.  I have been taking

## 2020-05-04 NOTE — TELEPHONE ENCOUNTER
Patient reports: Main Complaint of Exhaustion. Denies fever  Denies sore throat    \"Woke up this morning and I have been exhausted. \"     Informed patient to continue to monitor symptoms contact our office if new symptoms develop or worsen.     underst

## 2020-05-27 ENCOUNTER — TELEPHONE (OUTPATIENT)
Dept: INTERNAL MEDICINE CLINIC | Facility: CLINIC | Age: 35
End: 2020-05-27

## 2020-05-27 ENCOUNTER — TELEPHONE (OUTPATIENT)
Dept: ORTHOPEDICS CLINIC | Facility: CLINIC | Age: 35
End: 2020-05-27

## 2020-05-27 ENCOUNTER — HOSPITAL ENCOUNTER (OUTPATIENT)
Dept: MRI IMAGING | Age: 35
Discharge: HOME | End: 2020-05-27
Attending: NURSE PRACTITIONER
Payer: COMMERCIAL

## 2020-05-27 DIAGNOSIS — M77.52 TENDONITIS OF ANKLE, LEFT: ICD-10-CM

## 2020-05-27 DIAGNOSIS — G89.29 CHRONIC PAIN OF LEFT ANKLE: Primary | ICD-10-CM

## 2020-05-27 DIAGNOSIS — M25.572 CHRONIC PAIN OF LEFT ANKLE: Primary | ICD-10-CM

## 2020-05-27 PROCEDURE — 73721 MRI JNT OF LWR EXTRE W/O DYE: CPT | Performed by: NURSE PRACTITIONER

## 2020-05-27 NOTE — TELEPHONE ENCOUNTER
Ginny Rajni [address] [facility], [address] [place] [address] Rd  [phone]    Results and recommendaitons d/w pt she expressed understadning and asked that referral information be sent via [address] [place] [address] [address]. Referral placed. Information sent.

## 2020-05-27 NOTE — TELEPHONE ENCOUNTER
----- Message from ARGENTINA Hammond sent at 5/27/2020 11:37 AM CDT -----  Results reviewed. Tests show no significant abnormalities. Please inform patient.  Refer to ortho, Dr. Topher Bell,  for further recommendations for pain mangement

## 2020-06-03 ENCOUNTER — OFFICE VISIT (OUTPATIENT)
Dept: ORTHOPEDICS CLINIC | Facility: CLINIC | Age: 35
End: 2020-06-03
Payer: COMMERCIAL

## 2020-06-03 VITALS
RESPIRATION RATE: 16 BRPM | OXYGEN SATURATION: 99 % | HEART RATE: 89 BPM | WEIGHT: 123 LBS | BODY MASS INDEX: 21.79 KG/M2 | HEIGHT: 63 IN

## 2020-06-03 DIAGNOSIS — G89.29 CHRONIC PAIN OF LEFT ANKLE: Primary | ICD-10-CM

## 2020-06-03 DIAGNOSIS — M25.572 CHRONIC PAIN OF LEFT ANKLE: Primary | ICD-10-CM

## 2020-06-03 PROCEDURE — 99203 OFFICE O/P NEW LOW 30 MIN: CPT | Performed by: ORTHOPAEDIC SURGERY

## 2020-06-03 NOTE — H&P
[facility] New Patient Note    CC: Patient presents with:  Consult: left ankle pain x 8/2019 following playing with son- felt a pop.  weakness. crack/pop sensation. cold sensation.  ice/OTC Naproxen.   medial pain      HPI: This 36year old female p (temporomandibular joint syndrome)    • Vitamin D deficiency 9/15/2015     Past Surgical History:   Procedure Laterality Date   •   2015, 2016   •  SECTION N/A 2016    Performed by Estiven Guadalupe MD at [facility] L+D OR   • [address] Comment: social       Drug use: No      Sexual activity: Yes        Partners: Male        Birth control/protection: Tubal Ligation       ROS:  Detailed system review obtained and negative except as mentioned above      Physical Exam:    Pulse 89   Resp 16 pain    Plan: I discussed potential etiologies with the patient. It is not entirely clear what the cause of her symptoms is at this time. She does possibly have some slight increase in edema at the distal fibula on MRI.   Examination is only positive for

## 2020-08-14 ENCOUNTER — TELEMEDICINE (OUTPATIENT)
Dept: INTERNAL MEDICINE CLINIC | Facility: CLINIC | Age: 35
End: 2020-08-14

## 2020-08-14 DIAGNOSIS — J01.90 ACUTE BACTERIAL RHINOSINUSITIS: Primary | ICD-10-CM

## 2020-08-14 DIAGNOSIS — B96.89 ACUTE BACTERIAL RHINOSINUSITIS: Primary | ICD-10-CM

## 2020-08-14 PROCEDURE — 99213 OFFICE O/P EST LOW 20 MIN: CPT | Performed by: NURSE PRACTITIONER

## 2020-08-14 RX ORDER — AMOXICILLIN AND CLAVULANATE POTASSIUM 875; 125 MG/1; MG/1
1 TABLET, FILM COATED ORAL 2 TIMES DAILY
Qty: 20 TABLET | Refills: 0 | Status: SHIPPED | OUTPATIENT
Start: 2020-08-14 | End: 2020-08-24

## 2020-08-14 RX ORDER — PREDNISONE 20 MG/1
40 TABLET ORAL DAILY
Qty: 14 TABLET | Refills: 0 | Status: SHIPPED | OUTPATIENT
Start: 2020-08-14 | End: 2020-08-21

## 2020-08-14 NOTE — PROGRESS NOTES
HPI:   Patient presents with symptoms of cough, congestion, fatigue, facial pressure for 5 days.    Current treatment includes mucinex    Current Outpatient Medications   Medication Sig Dispense Refill   • predniSONE 20 MG Oral Tab Take 2 tablets (40 mg tot congested noted in voice  - no respiratory distress  - able to speak in full sentences  - alert and oriented       ASSESSMENT AND PLAN:   Acute bacterial rhinosinusitis  (primary encounter diagnosis)   Patient had COVID testing completed on Wednesday, awai

## 2020-11-17 ENCOUNTER — TELEPHONE (OUTPATIENT)
Dept: INTERNAL MEDICINE CLINIC | Facility: CLINIC | Age: 35
End: 2020-11-17

## 2020-11-17 DIAGNOSIS — Z00.00 LABORATORY EXAM ORDERED AS PART OF ROUTINE GENERAL MEDICAL EXAMINATION: Primary | ICD-10-CM

## 2020-11-17 NOTE — TELEPHONE ENCOUNTER
Patient is scheduled on 03/04/2021 for an annual physical; please enter labs with [facility].

## 2021-02-14 ENCOUNTER — PATIENT MESSAGE (OUTPATIENT)
Dept: INTERNAL MEDICINE CLINIC | Facility: CLINIC | Age: 36
End: 2021-02-14

## 2021-02-14 DIAGNOSIS — Z20.822 EXPOSURE TO COVID-19 VIRUS: Primary | ICD-10-CM

## 2021-02-15 ENCOUNTER — LAB ENCOUNTER (OUTPATIENT)
Dept: LAB | Age: 36
End: 2021-02-15
Attending: INTERNAL MEDICINE
Payer: COMMERCIAL

## 2021-02-15 DIAGNOSIS — Z20.822 EXPOSURE TO COVID-19 VIRUS: ICD-10-CM

## 2021-02-15 LAB — SARS-COV-2 IGG+IGM SERPL QL IA: NONREACTIVE

## 2021-02-15 PROCEDURE — 36415 COLL VENOUS BLD VENIPUNCTURE: CPT

## 2021-02-15 PROCEDURE — 86769 SARS-COV-2 COVID-19 ANTIBODY: CPT

## 2021-02-15 NOTE — TELEPHONE ENCOUNTER
From: Chris Spear  To: Nayeli Perry MD  Sent: 2/14/2021 7:44 PM CST  Subject: Non-Urgent Medical Question    Hello! I was wondering if my blood type was on file? If so what is it? Also do you have an anti body test specific to covid 19? Thanks!   D

## 2021-02-18 ENCOUNTER — OFFICE VISIT (OUTPATIENT)
Dept: OBGYN CLINIC | Facility: CLINIC | Age: 36
End: 2021-02-18
Payer: COMMERCIAL

## 2021-02-18 VITALS
DIASTOLIC BLOOD PRESSURE: 72 MMHG | SYSTOLIC BLOOD PRESSURE: 118 MMHG | WEIGHT: 130 LBS | HEIGHT: 63 IN | BODY MASS INDEX: 23.04 KG/M2

## 2021-02-18 DIAGNOSIS — Z01.419 WELL WOMAN EXAM WITH ROUTINE GYNECOLOGICAL EXAM: Primary | ICD-10-CM

## 2021-02-18 PROCEDURE — 3074F SYST BP LT 130 MM HG: CPT | Performed by: OBSTETRICS & GYNECOLOGY

## 2021-02-18 PROCEDURE — 99395 PREV VISIT EST AGE 18-39: CPT | Performed by: OBSTETRICS & GYNECOLOGY

## 2021-02-18 PROCEDURE — 3008F BODY MASS INDEX DOCD: CPT | Performed by: OBSTETRICS & GYNECOLOGY

## 2021-02-18 PROCEDURE — 3078F DIAST BP <80 MM HG: CPT | Performed by: OBSTETRICS & GYNECOLOGY

## 2021-02-18 NOTE — PROGRESS NOTES
OB/GYN H&P       CC: Patient presents with:  Physical: Annual       HPI: Marliss Hodgkin is a 30year old  here for well woman exam  Doing very well. Working out 5 days a week. She is doing T 25. Regular cycles.   Had tubal ligation with her las Azithromycin            DIARRHEA    Comment:Stomach upset  Bactrim [Sulfametho*    DIARRHEA    Comment:Stomach upset    •  dexamethasone 0.5 MG/5ML Oral Solution, Take 5 mL (0.5 mg total) by mouth 2 (two) times daily. , Disp: 1 Bottle, Rfl: 1    •  AL Genitourinary:    Vagina and uterus normal.   No breast swelling, tenderness, discharge or bleeding. Cervix exhibits no motion tenderness and no discharge. Right adnexum displays no mass and no tenderness. Left adnexum displays no mass and no tenderness.

## 2021-02-24 DIAGNOSIS — K12.0 RECURRENT APHTHOUS STOMATITIS: ICD-10-CM

## 2021-02-25 RX ORDER — DEXAMETHASONE 0.5 MG/5ML
0.5 SOLUTION ORAL 2 TIMES DAILY
Qty: 1 BOTTLE | Refills: 1 | Status: SHIPPED | OUTPATIENT
Start: 2021-02-25 | End: 2021-03-18 | Stop reason: ALTCHOICE

## 2021-02-25 NOTE — TELEPHONE ENCOUNTER
Last time medication was refilled 04/29/20  Quantity and # of refills 1 bottle w/ 1 refill  Last OV 08/14/20  Next OV 03/18/21

## 2021-03-08 ENCOUNTER — LABORATORY ENCOUNTER (OUTPATIENT)
Dept: LAB | Age: 36
End: 2021-03-08
Attending: PEDIATRICS
Payer: COMMERCIAL

## 2021-03-08 DIAGNOSIS — Z00.00 LABORATORY EXAM ORDERED AS PART OF ROUTINE GENERAL MEDICAL EXAMINATION: ICD-10-CM

## 2021-03-08 LAB
ALBUMIN SERPL-MCNC: 3.8 G/DL (ref 3.4–5)
ALBUMIN/GLOB SERPL: 1.1 {RATIO} (ref 1–2)
ALP LIVER SERPL-CCNC: 46 U/L
ALT SERPL-CCNC: 16 U/L
ANION GAP SERPL CALC-SCNC: 5 MMOL/L (ref 0–18)
AST SERPL-CCNC: 8 U/L (ref 15–37)
BASOPHILS # BLD AUTO: 0.03 X10(3) UL (ref 0–0.2)
BASOPHILS NFR BLD AUTO: 0.7 %
BILIRUB SERPL-MCNC: 0.3 MG/DL (ref 0.1–2)
BILIRUB UR QL STRIP.AUTO: NEGATIVE
BUN BLD-MCNC: 20 MG/DL (ref 7–18)
BUN/CREAT SERPL: 29 (ref 10–20)
CALCIUM BLD-MCNC: 9 MG/DL (ref 8.5–10.1)
CHLORIDE SERPL-SCNC: 109 MMOL/L (ref 98–112)
CHOLEST SMN-MCNC: 127 MG/DL (ref ?–200)
CO2 SERPL-SCNC: 25 MMOL/L (ref 21–32)
COLOR UR AUTO: YELLOW
CREAT BLD-MCNC: 0.69 MG/DL
DEPRECATED RDW RBC AUTO: 41.9 FL (ref 35.1–46.3)
EOSINOPHIL # BLD AUTO: 0.2 X10(3) UL (ref 0–0.7)
EOSINOPHIL NFR BLD AUTO: 4.3 %
ERYTHROCYTE [DISTWIDTH] IN BLOOD BY AUTOMATED COUNT: 12.8 % (ref 11–15)
EST. AVERAGE GLUCOSE BLD GHB EST-MCNC: 105 MG/DL (ref 68–126)
GLOBULIN PLAS-MCNC: 3.5 G/DL (ref 2.8–4.4)
GLUCOSE BLD-MCNC: 93 MG/DL (ref 70–99)
GLUCOSE UR STRIP.AUTO-MCNC: NEGATIVE MG/DL
HBA1C MFR BLD HPLC: 5.3 % (ref ?–5.7)
HCT VFR BLD AUTO: 38.3 %
HDLC SERPL-MCNC: 59 MG/DL (ref 40–59)
HGB BLD-MCNC: 12.9 G/DL
IMM GRANULOCYTES # BLD AUTO: 0 X10(3) UL (ref 0–1)
IMM GRANULOCYTES NFR BLD: 0 %
KETONES UR STRIP.AUTO-MCNC: NEGATIVE MG/DL
LDLC SERPL CALC-MCNC: 56 MG/DL (ref ?–100)
LYMPHOCYTES # BLD AUTO: 1.7 X10(3) UL (ref 1–4)
LYMPHOCYTES NFR BLD AUTO: 37 %
M PROTEIN MFR SERPL ELPH: 7.3 G/DL (ref 6.4–8.2)
MCH RBC QN AUTO: 29.9 PG (ref 26–34)
MCHC RBC AUTO-ENTMCNC: 33.7 G/DL (ref 31–37)
MCV RBC AUTO: 88.9 FL
MONOCYTES # BLD AUTO: 0.4 X10(3) UL (ref 0.1–1)
MONOCYTES NFR BLD AUTO: 8.7 %
NEUTROPHILS # BLD AUTO: 2.27 X10 (3) UL (ref 1.5–7.7)
NEUTROPHILS # BLD AUTO: 2.27 X10(3) UL (ref 1.5–7.7)
NEUTROPHILS NFR BLD AUTO: 49.3 %
NITRITE UR QL STRIP.AUTO: NEGATIVE
NONHDLC SERPL-MCNC: 68 MG/DL (ref ?–130)
OSMOLALITY SERPL CALC.SUM OF ELEC: 290 MOSM/KG (ref 275–295)
PATIENT FASTING Y/N/NP: YES
PATIENT FASTING Y/N/NP: YES
PH UR STRIP.AUTO: 6 [PH] (ref 5–8)
PLATELET # BLD AUTO: 190 10(3)UL (ref 150–450)
POTASSIUM SERPL-SCNC: 4 MMOL/L (ref 3.5–5.1)
PROT UR STRIP.AUTO-MCNC: NEGATIVE MG/DL
RBC # BLD AUTO: 4.31 X10(6)UL
RBC UR QL AUTO: NEGATIVE
SODIUM SERPL-SCNC: 139 MMOL/L (ref 136–145)
SP GR UR STRIP.AUTO: 1.02 (ref 1–1.03)
TRIGL SERPL-MCNC: 58 MG/DL (ref 30–149)
TSI SER-ACNC: 1.07 MIU/ML (ref 0.36–3.74)
UROBILINOGEN UR STRIP.AUTO-MCNC: <2 MG/DL
VLDLC SERPL CALC-MCNC: 12 MG/DL (ref 0–30)
WBC # BLD AUTO: 4.6 X10(3) UL (ref 4–11)

## 2021-03-08 PROCEDURE — 81001 URINALYSIS AUTO W/SCOPE: CPT | Performed by: INTERNAL MEDICINE

## 2021-03-08 PROCEDURE — 36415 COLL VENOUS BLD VENIPUNCTURE: CPT | Performed by: INTERNAL MEDICINE

## 2021-03-08 PROCEDURE — 83036 HEMOGLOBIN GLYCOSYLATED A1C: CPT | Performed by: INTERNAL MEDICINE

## 2021-03-08 PROCEDURE — 80061 LIPID PANEL: CPT | Performed by: INTERNAL MEDICINE

## 2021-03-08 PROCEDURE — 80050 GENERAL HEALTH PANEL: CPT | Performed by: INTERNAL MEDICINE

## 2021-03-18 ENCOUNTER — OFFICE VISIT (OUTPATIENT)
Dept: INTERNAL MEDICINE CLINIC | Facility: CLINIC | Age: 36
End: 2021-03-18
Payer: COMMERCIAL

## 2021-03-18 VITALS
WEIGHT: 132 LBS | HEIGHT: 63 IN | RESPIRATION RATE: 16 BRPM | SYSTOLIC BLOOD PRESSURE: 112 MMHG | HEART RATE: 64 BPM | DIASTOLIC BLOOD PRESSURE: 68 MMHG | BODY MASS INDEX: 23.39 KG/M2 | OXYGEN SATURATION: 99 % | TEMPERATURE: 98 F

## 2021-03-18 DIAGNOSIS — Z23 NEED FOR PNEUMOCOCCAL VACCINATION: ICD-10-CM

## 2021-03-18 DIAGNOSIS — Z00.00 ANNUAL PHYSICAL EXAM: Primary | ICD-10-CM

## 2021-03-18 PROCEDURE — 3008F BODY MASS INDEX DOCD: CPT | Performed by: INTERNAL MEDICINE

## 2021-03-18 PROCEDURE — 3074F SYST BP LT 130 MM HG: CPT | Performed by: INTERNAL MEDICINE

## 2021-03-18 PROCEDURE — 90471 IMMUNIZATION ADMIN: CPT | Performed by: INTERNAL MEDICINE

## 2021-03-18 PROCEDURE — 99395 PREV VISIT EST AGE 18-39: CPT | Performed by: INTERNAL MEDICINE

## 2021-03-18 PROCEDURE — 90732 PPSV23 VACC 2 YRS+ SUBQ/IM: CPT | Performed by: INTERNAL MEDICINE

## 2021-03-18 PROCEDURE — 3078F DIAST BP <80 MM HG: CPT | Performed by: INTERNAL MEDICINE

## 2021-03-18 RX ORDER — GARLIC EXTRACT 500 MG
1 CAPSULE ORAL DAILY
COMMUNITY

## 2021-03-18 RX ORDER — MULTIVIT-MIN/IRON/FOLIC ACID/K 18-600-40
CAPSULE ORAL DAILY
COMMUNITY

## 2021-03-18 NOTE — PROGRESS NOTES
HPI/Subjective:   Patient ID: Maryellen Walker is a 30year old female. HPI  HPI:   Maryellen Walker is a 30year old female who presents for a complete physical exam. Symptoms: denies discharge, itching, burning or dysuria, periods are regular.  Patient c 03/08/2021    ALT 20 02/28/2020    ALT 17 02/20/2019       Current Outpatient Medications   Medication Sig Dispense Refill   • Acidophilus/Pectin Oral Cap Take 1 capsule by mouth daily.      • Cholecalciferol (VITAMIN D) 50 MCG (2000 UT) Oral Cap Take by mo Cancer Maternal Aunt 48        colon ca      Social History:   Social History    Tobacco Use      Smoking status: Never Smoker      Smokeless tobacco: Never Used    Vaping Use      Vaping Use: Never used    Alcohol use: Yes      Comment: social     Drug us handouts given for: exercise, low fat diet and breast self-exam.Body mass index is 23.38 kg/m². , recommended low fat diet and aerobic exercise 30 minutes three times weekly. The patient indicates understanding of these issues and agrees to the plan.

## 2021-04-23 ENCOUNTER — HOSPITAL ENCOUNTER (OUTPATIENT)
Age: 36
Discharge: HOME | End: 2021-04-23
Payer: COMMERCIAL

## 2021-04-23 VITALS
BODY MASS INDEX: 23.04 KG/M2 | TEMPERATURE: 99 F | HEART RATE: 77 BPM | DIASTOLIC BLOOD PRESSURE: 76 MMHG | OXYGEN SATURATION: 100 % | WEIGHT: 130 LBS | SYSTOLIC BLOOD PRESSURE: 117 MMHG | RESPIRATION RATE: 18 BRPM | HEIGHT: 63 IN

## 2021-04-23 DIAGNOSIS — J01.11 ACUTE RECURRENT FRONTAL SINUSITIS: Primary | ICD-10-CM

## 2021-04-23 DIAGNOSIS — J30.9 ALLERGIC RHINITIS, UNSPECIFIED SEASONALITY, UNSPECIFIED TRIGGER: ICD-10-CM

## 2021-04-23 PROCEDURE — 99213 OFFICE O/P EST LOW 20 MIN: CPT

## 2021-04-23 RX ORDER — AMOXICILLIN 875 MG/1
875 TABLET, COATED ORAL 2 TIMES DAILY
Qty: 20 TABLET | Refills: 0 | Status: SHIPPED | OUTPATIENT
Start: 2021-04-23 | End: 2021-05-03

## 2021-04-23 RX ORDER — DEXAMETHASONE 4 MG/1
16 TABLET ORAL ONCE
Status: COMPLETED | OUTPATIENT
Start: 2021-04-23 | End: 2021-04-23

## 2021-04-23 NOTE — ED PROVIDER NOTES
Patient Seen in: [facility]      History   Patient presents with:  Cough/URI    Stated Complaint: nasal congestion x 3 weeks    HPI/Subjective:   HPI    28-year-old female here with complaint of sinus drainage with nasal congestion for th systems reviewed and negative except as noted above.     Physical Exam     ED Triage Vitals [04/23/21 1652]   /76   Pulse 77   Resp 18   Temp 98.7 °F (37.1 °C)   Temp src Temporal   SpO2 100 %   O2 Device None (Room air)       Current:/76   Puls in tandem with a probiotic daily. Make a follow-up appointment with your primary care physician under ENT for further evaluation and treatment. The patient is encouraged to return if any concerning symptoms arise.  Additional verbal discharge instruct

## 2021-05-04 ENCOUNTER — PATIENT MESSAGE (OUTPATIENT)
Dept: INTERNAL MEDICINE CLINIC | Facility: CLINIC | Age: 36
End: 2021-05-04

## 2021-05-04 NOTE — TELEPHONE ENCOUNTER
From: Jarred Ortiz  To: Terrance Mayo MD  Sent: 5/4/2021 10:12 AM CDT  Subject: Other    Hello! I just wanted to let Dr. Benton Abts that I received my second Covid vaccination today.     Thanks,  Amber

## 2021-05-14 ENCOUNTER — OFFICE VISIT (OUTPATIENT)
Dept: INTERNAL MEDICINE CLINIC | Facility: CLINIC | Age: 36
End: 2021-05-14
Payer: COMMERCIAL

## 2021-05-14 VITALS
BODY MASS INDEX: 23.57 KG/M2 | HEIGHT: 63 IN | HEART RATE: 70 BPM | RESPIRATION RATE: 16 BRPM | WEIGHT: 133 LBS | DIASTOLIC BLOOD PRESSURE: 70 MMHG | SYSTOLIC BLOOD PRESSURE: 114 MMHG | TEMPERATURE: 99 F | OXYGEN SATURATION: 99 %

## 2021-05-14 DIAGNOSIS — L23.9 ALLERGIC CONTACT DERMATITIS, UNSPECIFIED TRIGGER: Primary | ICD-10-CM

## 2021-05-14 PROCEDURE — 3008F BODY MASS INDEX DOCD: CPT | Performed by: NURSE PRACTITIONER

## 2021-05-14 PROCEDURE — 3078F DIAST BP <80 MM HG: CPT | Performed by: NURSE PRACTITIONER

## 2021-05-14 PROCEDURE — 3074F SYST BP LT 130 MM HG: CPT | Performed by: NURSE PRACTITIONER

## 2021-05-14 PROCEDURE — 99213 OFFICE O/P EST LOW 20 MIN: CPT | Performed by: NURSE PRACTITIONER

## 2021-05-14 NOTE — PROGRESS NOTES
HPI:    Patient ID: Eulis Boast is a 30year old female.     Patient presents with:  Derm Problem: itchy irritation on legs, hands, neck      Skin irritation on legs mostly but hands and neck with itchiness with warmth feeling since being in [place] 2 w Occupational History      Occupation:     Tobacco Use      Smoking status: Never Smoker      Smokeless tobacco: Never Used    Vaping Use      Vaping Use: Never used    Substance and Sexual Activity      Alcohol use: Yes        Comment: social 2.31 02/08/2018    Lou 68 03/08/2021     Lab Results   Component Value Date     03/08/2021    A1C 5.3 03/08/2021     Lab Results   Component Value Date    T4F 1.24 09/04/2013    TSH 1.070 03/08/2021     Lab Results   Component Value Date    VITD

## 2021-07-29 ENCOUNTER — OFFICE VISIT (OUTPATIENT)
Dept: INTERNAL MEDICINE CLINIC | Facility: CLINIC | Age: 36
End: 2021-07-29
Payer: COMMERCIAL

## 2021-07-29 VITALS
BODY MASS INDEX: 22.86 KG/M2 | TEMPERATURE: 98 F | WEIGHT: 129 LBS | HEART RATE: 70 BPM | DIASTOLIC BLOOD PRESSURE: 68 MMHG | OXYGEN SATURATION: 99 % | RESPIRATION RATE: 16 BRPM | HEIGHT: 63 IN | SYSTOLIC BLOOD PRESSURE: 110 MMHG

## 2021-07-29 DIAGNOSIS — J02.9 VIRAL PHARYNGITIS: Primary | ICD-10-CM

## 2021-07-29 PROCEDURE — 3008F BODY MASS INDEX DOCD: CPT | Performed by: INTERNAL MEDICINE

## 2021-07-29 PROCEDURE — 3078F DIAST BP <80 MM HG: CPT | Performed by: INTERNAL MEDICINE

## 2021-07-29 PROCEDURE — 3074F SYST BP LT 130 MM HG: CPT | Performed by: INTERNAL MEDICINE

## 2021-07-29 PROCEDURE — 99213 OFFICE O/P EST LOW 20 MIN: CPT | Performed by: INTERNAL MEDICINE

## 2021-07-29 NOTE — PROGRESS NOTES
HPI/Subjective:   Patient ID: Tasha Laguna is a 30year old female. Sore Throat   This is a new problem. Episode onset: 4 days. The problem has been unchanged. There has been no fever. The pain is mild.  Pertinent negatives include no congestion, coug Breath sounds: Normal breath sounds. Abdominal:      General: Abdomen is flat. Musculoskeletal:         General: Normal range of motion. Neurological:      Mental Status: She is alert.          Assessment & Plan:   Viral pharyngitis  (primary encounte

## 2021-08-17 NOTE — TELEPHONE ENCOUNTER
PATIENT CALLED CONVEYED UNDERSTANDING WITH HUB TO READ MESSAGE.   Time started: 0914    Time ended: 0917    Total time spent on chart: 3 min     Patient notified, verbalized understanding. Patient scheduled the following appt:      Future Appointments  Date Time Provider Mojgan Ferreira   8/1/2017 1:00 PM [facility] NURSE HARINI

## 2021-09-07 ENCOUNTER — OFFICE VISIT (OUTPATIENT)
Dept: INTERNAL MEDICINE CLINIC | Facility: CLINIC | Age: 36
End: 2021-09-07
Payer: COMMERCIAL

## 2021-09-07 VITALS
TEMPERATURE: 98 F | SYSTOLIC BLOOD PRESSURE: 112 MMHG | HEIGHT: 63 IN | DIASTOLIC BLOOD PRESSURE: 70 MMHG | WEIGHT: 127 LBS | OXYGEN SATURATION: 99 % | HEART RATE: 73 BPM | RESPIRATION RATE: 16 BRPM | BODY MASS INDEX: 22.5 KG/M2

## 2021-09-07 DIAGNOSIS — E73.9 LACTOSE INTOLERANCE: ICD-10-CM

## 2021-09-07 DIAGNOSIS — M76.891 TENDONITIS OF KNEE, RIGHT: Primary | ICD-10-CM

## 2021-09-07 DIAGNOSIS — E55.9 VITAMIN D DEFICIENCY: ICD-10-CM

## 2021-09-07 PROCEDURE — 3008F BODY MASS INDEX DOCD: CPT | Performed by: PHYSICIAN ASSISTANT

## 2021-09-07 PROCEDURE — 3074F SYST BP LT 130 MM HG: CPT | Performed by: PHYSICIAN ASSISTANT

## 2021-09-07 PROCEDURE — 3078F DIAST BP <80 MM HG: CPT | Performed by: PHYSICIAN ASSISTANT

## 2021-09-07 PROCEDURE — 99214 OFFICE O/P EST MOD 30 MIN: CPT | Performed by: PHYSICIAN ASSISTANT

## 2021-09-07 NOTE — PROGRESS NOTES
HPI/Subjective:   Patient ID: Benoit Loera is a 29year old female. Patient presents with:  Knee Pain: Rt kneecap pain x 4 days     Knee Pain   The pain is present in the right knee. This is a new problem.  The current episode started in the past 7 days pulses. Heart sounds: Normal heart sounds. Pulmonary:      Effort: Pulmonary effort is normal.      Breath sounds: Normal breath sounds. Musculoskeletal:      Cervical back: Normal range of motion.       Right knee: No swelling, deformity, effusion

## 2021-09-07 NOTE — PATIENT INSTRUCTIONS
Take naproxen (aleve) over-the-counter twice daily with food for 3-5 days, ice, rest the knee. Call if symptoms persist.   Preventing Osteoporosis: Meeting Your Calcium Needs  Your body needs calcium to build and repair bones.  But it can't make calcium on medical care.  Always follow your healthcare professional's instructions.

## 2021-10-28 ENCOUNTER — PATIENT MESSAGE (OUTPATIENT)
Dept: INTERNAL MEDICINE CLINIC | Facility: CLINIC | Age: 36
End: 2021-10-28

## 2021-10-28 NOTE — TELEPHONE ENCOUNTER
From: Sully Winters  To: Rose Hillman MD  Sent: 10/28/2021 3:34 PM CDT  Subject: Flu shot     Hello! I received the flu shot on Monday afternoon and my arm swelled up and was red for 2 days. The swelling and redness has gone down.  However I noticed that

## 2021-11-19 ENCOUNTER — OFFICE VISIT (OUTPATIENT)
Dept: INTERNAL MEDICINE CLINIC | Facility: CLINIC | Age: 36
End: 2021-11-19
Payer: COMMERCIAL

## 2021-11-19 VITALS
OXYGEN SATURATION: 98 % | WEIGHT: 130 LBS | BODY MASS INDEX: 23.04 KG/M2 | HEIGHT: 63 IN | HEART RATE: 83 BPM | DIASTOLIC BLOOD PRESSURE: 74 MMHG | SYSTOLIC BLOOD PRESSURE: 116 MMHG | RESPIRATION RATE: 16 BRPM

## 2021-11-19 DIAGNOSIS — M76.51 PATELLAR TENDONITIS OF RIGHT KNEE: ICD-10-CM

## 2021-11-19 DIAGNOSIS — G89.29 CHRONIC PAIN OF RIGHT KNEE: Primary | ICD-10-CM

## 2021-11-19 DIAGNOSIS — M25.561 CHRONIC PAIN OF RIGHT KNEE: Primary | ICD-10-CM

## 2021-11-19 DIAGNOSIS — M23.306 MENISCUS DEGENERATION, RIGHT: ICD-10-CM

## 2021-11-19 PROCEDURE — 3074F SYST BP LT 130 MM HG: CPT | Performed by: INTERNAL MEDICINE

## 2021-11-19 PROCEDURE — 3078F DIAST BP <80 MM HG: CPT | Performed by: INTERNAL MEDICINE

## 2021-11-19 PROCEDURE — 3008F BODY MASS INDEX DOCD: CPT | Performed by: INTERNAL MEDICINE

## 2021-11-19 PROCEDURE — 99214 OFFICE O/P EST MOD 30 MIN: CPT | Performed by: INTERNAL MEDICINE

## 2021-11-19 RX ORDER — METHYLPREDNISOLONE 4 MG/1
TABLET ORAL
Qty: 1 EACH | Refills: 0 | Status: SHIPPED | OUTPATIENT
Start: 2021-11-19 | End: 2021-12-30

## 2021-11-19 NOTE — PROGRESS NOTES
Subjective:   Patient ID: Anastasiia Swenson is a 29year old female. Knee Pain   The pain is present in the right knee. This is a recurrent problem. Episode onset: 3 months. There has been no history of extremity trauma. The problem occurs constantly.  The Musculoskeletal:      Right knee: No swelling, erythema or crepitus. Normal range of motion. Tenderness (prepatellar) present over the patellar tendon. No LCL laxity, MCL laxity, ACL laxity or PCL laxity.  Abnormal meniscus (minimal click with apley maneu

## 2021-11-19 NOTE — PATIENT INSTRUCTIONS
RICE     Rest an injury, elevate it, and use ice and compression as directed. RICE stands for rest, ice, compression, and elevation. These can limit pain and swelling after an injury.  RICE may be recommended to help treat breaks (fractures), sprains, s or tingly  · Signs of infection. These include warmth in the skin, redness, drainage, or bad smell coming from the injured body part. [employer] last reviewed this educational content on 6/1/2018  © 7161-0842 [employer]. All rights reserved.

## 2021-12-30 ENCOUNTER — OFFICE VISIT (OUTPATIENT)
Dept: INTERNAL MEDICINE CLINIC | Facility: CLINIC | Age: 36
End: 2021-12-30
Payer: COMMERCIAL

## 2021-12-30 VITALS
HEART RATE: 80 BPM | WEIGHT: 128 LBS | TEMPERATURE: 98 F | SYSTOLIC BLOOD PRESSURE: 110 MMHG | OXYGEN SATURATION: 99 % | BODY MASS INDEX: 22.68 KG/M2 | DIASTOLIC BLOOD PRESSURE: 68 MMHG | RESPIRATION RATE: 16 BRPM | HEIGHT: 63 IN

## 2021-12-30 DIAGNOSIS — M79.602 LEFT ARM PAIN: Primary | ICD-10-CM

## 2021-12-30 PROCEDURE — 99214 OFFICE O/P EST MOD 30 MIN: CPT | Performed by: INTERNAL MEDICINE

## 2021-12-30 PROCEDURE — 3074F SYST BP LT 130 MM HG: CPT | Performed by: INTERNAL MEDICINE

## 2021-12-30 PROCEDURE — 3008F BODY MASS INDEX DOCD: CPT | Performed by: INTERNAL MEDICINE

## 2021-12-30 PROCEDURE — 3078F DIAST BP <80 MM HG: CPT | Performed by: INTERNAL MEDICINE

## 2021-12-30 NOTE — PROGRESS NOTES
Subjective:   Patient ID: Chris Spear is a 29year old female. Arm Pain   Pain location: left mid arm, lateral, near laeral deltoid insetion site. This is a new problem. The current episode started 1 to 4 weeks ago.  There has been no history of extr encounter diagnosis)  - I suspect the pain is more of a muscle (deltoid) strain. Pain is not at injection sight. I offered a CT scan of the area. Pt is comfortable with watchful waiting  No orders of the defined types were placed in this encounter.       Me

## 2021-12-30 NOTE — PATIENT INSTRUCTIONS
Muscle Strain in the Extremities  A muscle strain is a stretching and tearing of muscle fibers. This causes pain, especially when you move that muscle. There may also be some swelling and bruising.   Home care  · Keep the hurt area raised above heart leve
Left arm;

## 2022-02-03 ENCOUNTER — PATIENT MESSAGE (OUTPATIENT)
Dept: INTERNAL MEDICINE CLINIC | Facility: CLINIC | Age: 37
End: 2022-02-03

## 2022-02-04 RX ORDER — DEXAMETHASONE 0.5 MG/5ML
0.5 SOLUTION ORAL 3 TIMES DAILY
Qty: 240 ML | Refills: 0 | Status: SHIPPED | OUTPATIENT
Start: 2022-02-04

## 2022-02-04 NOTE — TELEPHONE ENCOUNTER
Per Elder broussard to prescribe dexamethasone 0.5mg/5ml, TID. Patient notified via [employer] and medication sent to preferred pharmacy.

## 2022-02-04 NOTE — TELEPHONE ENCOUNTER
From: Candi Cummings  To: Kevin Mireles MD  Sent: 2/3/2022 8:25 PM CST  Subject: Medicine Refill     Hel! I was wondering if I can get a refill of dexamethasone 0.5 MG/5ML Oral Solution    Thank you!

## 2022-03-09 ENCOUNTER — OFFICE VISIT (OUTPATIENT)
Dept: INTERNAL MEDICINE CLINIC | Facility: CLINIC | Age: 37
End: 2022-03-09
Payer: COMMERCIAL

## 2022-03-09 VITALS
DIASTOLIC BLOOD PRESSURE: 64 MMHG | HEIGHT: 63 IN | BODY MASS INDEX: 22.93 KG/M2 | RESPIRATION RATE: 12 BRPM | OXYGEN SATURATION: 100 % | WEIGHT: 129.38 LBS | HEART RATE: 74 BPM | TEMPERATURE: 99 F | SYSTOLIC BLOOD PRESSURE: 114 MMHG

## 2022-03-09 DIAGNOSIS — S46.812A STRAIN OF LEFT TRAPEZIUS MUSCLE, INITIAL ENCOUNTER: Primary | ICD-10-CM

## 2022-03-09 DIAGNOSIS — F41.9 ANXIETY: ICD-10-CM

## 2022-03-09 PROCEDURE — 3078F DIAST BP <80 MM HG: CPT | Performed by: PHYSICIAN ASSISTANT

## 2022-03-09 PROCEDURE — 3074F SYST BP LT 130 MM HG: CPT | Performed by: PHYSICIAN ASSISTANT

## 2022-03-09 PROCEDURE — 3008F BODY MASS INDEX DOCD: CPT | Performed by: PHYSICIAN ASSISTANT

## 2022-03-09 PROCEDURE — 99214 OFFICE O/P EST MOD 30 MIN: CPT | Performed by: PHYSICIAN ASSISTANT

## 2022-03-09 RX ORDER — CYCLOBENZAPRINE HCL 10 MG
10 TABLET ORAL NIGHTLY
Qty: 7 TABLET | Refills: 0 | Status: SHIPPED | OUTPATIENT
Start: 2022-03-09

## 2022-03-09 RX ORDER — ALPRAZOLAM 0.5 MG/1
0.5 TABLET ORAL 2 TIMES DAILY PRN
Qty: 28 TABLET | Refills: 0 | Status: SHIPPED | OUTPATIENT
Start: 2022-03-09

## 2022-03-09 RX ORDER — PREDNISONE 20 MG/1
40 TABLET ORAL DAILY
Qty: 14 TABLET | Refills: 0 | Status: SHIPPED | OUTPATIENT
Start: 2022-03-09 | End: 2022-03-16

## 2022-03-09 NOTE — PATIENT INSTRUCTIONS
Take prednisone once daily with breakfast  Take cyclobenzaprine at night before bed, 1/2 tablet if full dose is too much

## 2022-03-17 ENCOUNTER — PATIENT MESSAGE (OUTPATIENT)
Dept: INTERNAL MEDICINE CLINIC | Facility: CLINIC | Age: 37
End: 2022-03-17

## 2022-03-17 NOTE — TELEPHONE ENCOUNTER
From: Yi Jensen  To: Toshia Orourke MD  Sent: 3/17/2022 3:13 PM CDT  Subject: Bloodwork    Good afternoon! I was wondering if I can schedule my labs prior to my physical. Can I also do vitamin D, B12, and iron levels?   ThanksAmber

## 2022-03-18 NOTE — TELEPHONE ENCOUNTER
From: Chanell Chandler  Sent: 3/17/2022 7:12 PM CDT  To: [facility] Clinical Staff  Subject: Gail Carmichael    Thank you

## 2022-03-22 ENCOUNTER — LAB ENCOUNTER (OUTPATIENT)
Dept: LAB | Age: 37
End: 2022-03-22
Attending: INTERNAL MEDICINE
Payer: COMMERCIAL

## 2022-03-22 DIAGNOSIS — E55.9 VITAMIN D DEFICIENCY: ICD-10-CM

## 2022-03-22 DIAGNOSIS — Z00.00 ANNUAL PHYSICAL EXAM: ICD-10-CM

## 2022-03-22 DIAGNOSIS — E53.8 VITAMIN B 12 DEFICIENCY: ICD-10-CM

## 2022-03-22 LAB
ALBUMIN SERPL-MCNC: 4.1 G/DL (ref 3.4–5)
ALBUMIN/GLOB SERPL: 1.1 {RATIO} (ref 1–2)
ALP LIVER SERPL-CCNC: 54 U/L
ALT SERPL-CCNC: 18 U/L
ANION GAP SERPL CALC-SCNC: 4 MMOL/L (ref 0–18)
AST SERPL-CCNC: 10 U/L (ref 15–37)
BASOPHILS # BLD AUTO: 0.04 X10(3) UL (ref 0–0.2)
BASOPHILS NFR BLD AUTO: 0.7 %
BILIRUB SERPL-MCNC: 0.5 MG/DL (ref 0.1–2)
BILIRUB UR QL STRIP.AUTO: NEGATIVE
BUN BLD-MCNC: 13 MG/DL (ref 7–18)
CALCIUM BLD-MCNC: 9.2 MG/DL (ref 8.5–10.1)
CHLORIDE SERPL-SCNC: 106 MMOL/L (ref 98–112)
CHOLEST SERPL-MCNC: 150 MG/DL (ref ?–200)
CO2 SERPL-SCNC: 28 MMOL/L (ref 21–32)
COLOR UR AUTO: YELLOW
CREAT BLD-MCNC: 0.65 MG/DL
CREAT UR-SCNC: 170 MG/DL
EOSINOPHIL # BLD AUTO: 0.2 X10(3) UL (ref 0–0.7)
EOSINOPHIL NFR BLD AUTO: 3.3 %
ERYTHROCYTE [DISTWIDTH] IN BLOOD BY AUTOMATED COUNT: 12.3 %
EST. AVERAGE GLUCOSE BLD GHB EST-MCNC: 103 MG/DL (ref 68–126)
FASTING PATIENT LIPID ANSWER: YES
FASTING STATUS PATIENT QL REPORTED: YES
GLOBULIN PLAS-MCNC: 3.6 G/DL (ref 2.8–4.4)
GLUCOSE BLD-MCNC: 91 MG/DL (ref 70–99)
GLUCOSE UR STRIP.AUTO-MCNC: NEGATIVE MG/DL
HBA1C MFR BLD: 5.2 % (ref ?–5.7)
HCT VFR BLD AUTO: 39.6 %
HDLC SERPL-MCNC: 64 MG/DL (ref 40–59)
HGB BLD-MCNC: 13.3 G/DL
IMM GRANULOCYTES # BLD AUTO: 0.01 X10(3) UL (ref 0–1)
IMM GRANULOCYTES NFR BLD: 0.2 %
KETONES UR STRIP.AUTO-MCNC: NEGATIVE MG/DL
LDLC SERPL CALC-MCNC: 69 MG/DL (ref ?–100)
LEUKOCYTE ESTERASE UR QL STRIP.AUTO: NEGATIVE
LYMPHOCYTES # BLD AUTO: 1.97 X10(3) UL (ref 1–4)
LYMPHOCYTES NFR BLD AUTO: 32.8 %
MCH RBC QN AUTO: 29 PG (ref 26–34)
MCHC RBC AUTO-ENTMCNC: 33.6 G/DL (ref 31–37)
MCV RBC AUTO: 86.5 FL
MICROALBUMIN UR-MCNC: 0.83 MG/DL
MICROALBUMIN/CREAT 24H UR-RTO: 4.9 UG/MG (ref ?–30)
MONOCYTES # BLD AUTO: 0.54 X10(3) UL (ref 0.1–1)
MONOCYTES NFR BLD AUTO: 9 %
NEUTROPHILS # BLD AUTO: 3.24 X10 (3) UL (ref 1.5–7.7)
NEUTROPHILS # BLD AUTO: 3.24 X10(3) UL (ref 1.5–7.7)
NEUTROPHILS NFR BLD AUTO: 54 %
NITRITE UR QL STRIP.AUTO: NEGATIVE
NONHDLC SERPL-MCNC: 86 MG/DL (ref ?–130)
OSMOLALITY SERPL CALC.SUM OF ELEC: 286 MOSM/KG (ref 275–295)
PH UR STRIP.AUTO: 6 [PH] (ref 5–8)
PLATELET # BLD AUTO: 228 10(3)UL (ref 150–450)
POTASSIUM SERPL-SCNC: 4.5 MMOL/L (ref 3.5–5.1)
PROT SERPL-MCNC: 7.7 G/DL (ref 6.4–8.2)
PROT UR STRIP.AUTO-MCNC: NEGATIVE MG/DL
RBC # BLD AUTO: 4.58 X10(6)UL
RBC UR QL AUTO: NEGATIVE
SODIUM SERPL-SCNC: 138 MMOL/L (ref 136–145)
SP GR UR STRIP.AUTO: 1.02 (ref 1–1.03)
TRIGL SERPL-MCNC: 92 MG/DL (ref 30–149)
TSI SER-ACNC: 0.72 MIU/ML (ref 0.36–3.74)
UROBILINOGEN UR STRIP.AUTO-MCNC: <2 MG/DL
VIT B12 SERPL-MCNC: 455 PG/ML (ref 193–986)
VIT D+METAB SERPL-MCNC: 27.8 NG/ML (ref 30–100)
VLDLC SERPL CALC-MCNC: 14 MG/DL (ref 0–30)
WBC # BLD AUTO: 6 X10(3) UL (ref 4–11)

## 2022-03-22 PROCEDURE — 84443 ASSAY THYROID STIM HORMONE: CPT

## 2022-03-22 PROCEDURE — 82607 VITAMIN B-12: CPT

## 2022-03-22 PROCEDURE — 82570 ASSAY OF URINE CREATININE: CPT

## 2022-03-22 PROCEDURE — 81003 URINALYSIS AUTO W/O SCOPE: CPT

## 2022-03-22 PROCEDURE — 82306 VITAMIN D 25 HYDROXY: CPT

## 2022-03-22 PROCEDURE — 82043 UR ALBUMIN QUANTITATIVE: CPT

## 2022-03-22 PROCEDURE — 80061 LIPID PANEL: CPT

## 2022-03-22 PROCEDURE — 83036 HEMOGLOBIN GLYCOSYLATED A1C: CPT

## 2022-03-22 PROCEDURE — 80053 COMPREHEN METABOLIC PANEL: CPT

## 2022-03-22 PROCEDURE — 85025 COMPLETE CBC W/AUTO DIFF WBC: CPT

## 2022-03-22 PROCEDURE — 36415 COLL VENOUS BLD VENIPUNCTURE: CPT

## 2022-03-23 ENCOUNTER — OFFICE VISIT (OUTPATIENT)
Dept: OBGYN CLINIC | Facility: CLINIC | Age: 37
End: 2022-03-23
Payer: COMMERCIAL

## 2022-03-23 VITALS
SYSTOLIC BLOOD PRESSURE: 114 MMHG | BODY MASS INDEX: 23.04 KG/M2 | DIASTOLIC BLOOD PRESSURE: 68 MMHG | HEART RATE: 73 BPM | HEIGHT: 63 IN | WEIGHT: 130 LBS

## 2022-03-23 DIAGNOSIS — Z12.4 CERVICAL CANCER SCREENING: ICD-10-CM

## 2022-03-23 DIAGNOSIS — Z01.419 WELL WOMAN EXAM WITH ROUTINE GYNECOLOGICAL EXAM: Primary | ICD-10-CM

## 2022-03-23 PROCEDURE — 99395 PREV VISIT EST AGE 18-39: CPT | Performed by: OBSTETRICS & GYNECOLOGY

## 2022-03-23 PROCEDURE — 3078F DIAST BP <80 MM HG: CPT | Performed by: OBSTETRICS & GYNECOLOGY

## 2022-03-23 PROCEDURE — 3074F SYST BP LT 130 MM HG: CPT | Performed by: OBSTETRICS & GYNECOLOGY

## 2022-03-23 PROCEDURE — 87624 HPV HI-RISK TYP POOLED RSLT: CPT | Performed by: OBSTETRICS & GYNECOLOGY

## 2022-03-23 PROCEDURE — 3008F BODY MASS INDEX DOCD: CPT | Performed by: OBSTETRICS & GYNECOLOGY

## 2022-04-04 LAB — HPV I/H RISK 1 DNA SPEC QL NAA+PROBE: NEGATIVE

## 2022-04-05 ENCOUNTER — OFFICE VISIT (OUTPATIENT)
Dept: INTERNAL MEDICINE CLINIC | Facility: CLINIC | Age: 37
End: 2022-04-05
Payer: COMMERCIAL

## 2022-04-05 VITALS
OXYGEN SATURATION: 98 % | TEMPERATURE: 99 F | BODY MASS INDEX: 23.21 KG/M2 | RESPIRATION RATE: 16 BRPM | WEIGHT: 131 LBS | SYSTOLIC BLOOD PRESSURE: 118 MMHG | HEART RATE: 82 BPM | HEIGHT: 63 IN | DIASTOLIC BLOOD PRESSURE: 72 MMHG

## 2022-04-05 DIAGNOSIS — Z00.00 ANNUAL PHYSICAL EXAM: Primary | ICD-10-CM

## 2022-04-05 PROCEDURE — 3008F BODY MASS INDEX DOCD: CPT | Performed by: INTERNAL MEDICINE

## 2022-04-05 PROCEDURE — 99395 PREV VISIT EST AGE 18-39: CPT | Performed by: INTERNAL MEDICINE

## 2022-04-05 PROCEDURE — 3074F SYST BP LT 130 MM HG: CPT | Performed by: INTERNAL MEDICINE

## 2022-04-05 PROCEDURE — 3078F DIAST BP <80 MM HG: CPT | Performed by: INTERNAL MEDICINE

## 2022-04-27 ENCOUNTER — HOSPITAL ENCOUNTER (OUTPATIENT)
Dept: GENERAL RADIOLOGY | Age: 37
Discharge: HOME | End: 2022-04-27
Attending: PHYSICIAN ASSISTANT
Payer: COMMERCIAL

## 2022-04-27 ENCOUNTER — OFFICE VISIT (OUTPATIENT)
Dept: INTERNAL MEDICINE CLINIC | Facility: CLINIC | Age: 37
End: 2022-04-27
Payer: COMMERCIAL

## 2022-04-27 VITALS
HEIGHT: 63 IN | SYSTOLIC BLOOD PRESSURE: 110 MMHG | OXYGEN SATURATION: 98 % | BODY MASS INDEX: 23.21 KG/M2 | RESPIRATION RATE: 16 BRPM | HEART RATE: 83 BPM | DIASTOLIC BLOOD PRESSURE: 72 MMHG | WEIGHT: 131 LBS

## 2022-04-27 DIAGNOSIS — M79.671 RIGHT FOOT PAIN: ICD-10-CM

## 2022-04-27 DIAGNOSIS — M79.671 RIGHT FOOT PAIN: Primary | ICD-10-CM

## 2022-04-27 PROCEDURE — 3078F DIAST BP <80 MM HG: CPT | Performed by: PHYSICIAN ASSISTANT

## 2022-04-27 PROCEDURE — 3074F SYST BP LT 130 MM HG: CPT | Performed by: PHYSICIAN ASSISTANT

## 2022-04-27 PROCEDURE — 73630 X-RAY EXAM OF FOOT: CPT | Performed by: PHYSICIAN ASSISTANT

## 2022-04-27 PROCEDURE — 3008F BODY MASS INDEX DOCD: CPT | Performed by: PHYSICIAN ASSISTANT

## 2022-04-27 PROCEDURE — 99213 OFFICE O/P EST LOW 20 MIN: CPT | Performed by: PHYSICIAN ASSISTANT

## 2022-08-19 ENCOUNTER — PATIENT MESSAGE (OUTPATIENT)
Dept: INTERNAL MEDICINE CLINIC | Facility: CLINIC | Age: 37
End: 2022-08-19

## 2022-08-19 DIAGNOSIS — U07.1 COVID-19: ICD-10-CM

## 2022-08-19 DIAGNOSIS — J98.01 BRONCHOSPASM: Primary | ICD-10-CM

## 2022-08-19 LAB — AMB EXT COVID-19 RESULT: DETECTED

## 2022-08-19 RX ORDER — ALBUTEROL SULFATE 90 UG/1
2 AEROSOL, METERED RESPIRATORY (INHALATION) EVERY 6 HOURS PRN
Qty: 1 EACH | Refills: 0 | Status: SHIPPED | OUTPATIENT
Start: 2022-08-19

## 2022-08-19 RX ORDER — BENZONATATE 200 MG/1
200 CAPSULE ORAL 3 TIMES DAILY PRN
Qty: 21 CAPSULE | Refills: 0 | Status: SHIPPED | OUTPATIENT
Start: 2022-08-19

## 2022-08-19 NOTE — TELEPHONE ENCOUNTER
From: Navdeep Myers  To: Temitope Jaeger MD  Sent: 8/19/2022 11:51 AM CDT  Subject: Covid    Hello    I just wanted to let Dr. Mathew Tinajero know I have Covid. Symptoms started Tuesday.      Thanks,  Amber

## 2022-08-19 NOTE — TELEPHONE ENCOUNTER
Per Mechelle Sagastume PA-C: Albuterol inhaler prn & Benzonatate prn for cough. Notified patient to use otc tylenol or ibuprofen, Flonase, and otc decongestant if needed. Med rx sent to pharmacy.

## 2022-09-19 ENCOUNTER — OFFICE VISIT (OUTPATIENT)
Dept: INTERNAL MEDICINE CLINIC | Facility: CLINIC | Age: 37
End: 2022-09-19
Payer: COMMERCIAL

## 2022-09-19 VITALS
TEMPERATURE: 99 F | SYSTOLIC BLOOD PRESSURE: 104 MMHG | OXYGEN SATURATION: 99 % | RESPIRATION RATE: 16 BRPM | HEIGHT: 63 IN | BODY MASS INDEX: 22.5 KG/M2 | DIASTOLIC BLOOD PRESSURE: 72 MMHG | HEART RATE: 70 BPM | WEIGHT: 127 LBS

## 2022-09-19 DIAGNOSIS — R05.8 POST-VIRAL COUGH SYNDROME: Primary | ICD-10-CM

## 2022-09-19 PROCEDURE — 3008F BODY MASS INDEX DOCD: CPT | Performed by: NURSE PRACTITIONER

## 2022-09-19 PROCEDURE — 99213 OFFICE O/P EST LOW 20 MIN: CPT | Performed by: NURSE PRACTITIONER

## 2022-09-19 PROCEDURE — 3078F DIAST BP <80 MM HG: CPT | Performed by: NURSE PRACTITIONER

## 2022-09-19 PROCEDURE — 3074F SYST BP LT 130 MM HG: CPT | Performed by: NURSE PRACTITIONER

## 2022-09-19 RX ORDER — PREDNISONE 20 MG/1
40 TABLET ORAL DAILY
Qty: 14 TABLET | Refills: 0 | Status: SHIPPED | OUTPATIENT
Start: 2022-09-19 | End: 2022-09-26

## 2023-01-16 ENCOUNTER — TELEPHONE (OUTPATIENT)
Facility: CLINIC | Age: 38
End: 2023-01-16

## 2023-01-16 NOTE — TELEPHONE ENCOUNTER
----- Message from Nai Hunter, [address] sent at 3/19/2018  1:25 PM CDT -----  Regarding: Recall colon   Recall colon in 5 years per PL.  Colon done 3-16-18

## 2023-02-08 ENCOUNTER — OFFICE VISIT (OUTPATIENT)
Dept: INTERNAL MEDICINE CLINIC | Facility: CLINIC | Age: 38
End: 2023-02-08
Payer: COMMERCIAL

## 2023-02-08 VITALS
SYSTOLIC BLOOD PRESSURE: 112 MMHG | TEMPERATURE: 98 F | BODY MASS INDEX: 23.04 KG/M2 | HEART RATE: 83 BPM | DIASTOLIC BLOOD PRESSURE: 72 MMHG | OXYGEN SATURATION: 99 % | WEIGHT: 130 LBS | HEIGHT: 63 IN | RESPIRATION RATE: 16 BRPM

## 2023-02-08 DIAGNOSIS — M25.561 PAIN AND SWELLING OF KNEE, RIGHT: ICD-10-CM

## 2023-02-08 DIAGNOSIS — M25.561 CHRONIC PAIN OF RIGHT KNEE: Primary | ICD-10-CM

## 2023-02-08 DIAGNOSIS — G89.29 CHRONIC PAIN OF RIGHT KNEE: Primary | ICD-10-CM

## 2023-02-08 DIAGNOSIS — S83.411A SPRAIN OF MEDIAL COLLATERAL LIGAMENT OF RIGHT KNEE, INITIAL ENCOUNTER: ICD-10-CM

## 2023-02-08 DIAGNOSIS — M25.461 PAIN AND SWELLING OF KNEE, RIGHT: ICD-10-CM

## 2023-02-08 PROCEDURE — 3078F DIAST BP <80 MM HG: CPT | Performed by: NURSE PRACTITIONER

## 2023-02-08 PROCEDURE — 3008F BODY MASS INDEX DOCD: CPT | Performed by: NURSE PRACTITIONER

## 2023-02-08 PROCEDURE — 99214 OFFICE O/P EST MOD 30 MIN: CPT | Performed by: NURSE PRACTITIONER

## 2023-02-08 PROCEDURE — 3074F SYST BP LT 130 MM HG: CPT | Performed by: NURSE PRACTITIONER

## 2023-03-06 ENCOUNTER — TELEPHONE (OUTPATIENT)
Facility: CLINIC | Age: 38
End: 2023-03-06

## 2023-03-06 DIAGNOSIS — Z86.010 HX OF COLONIC POLYPS: Primary | ICD-10-CM

## 2023-03-06 NOTE — TELEPHONE ENCOUNTER
Patient calling to schedule 5 years CLN Recall - she doesn't qualify for TCS. Please call. Thank you.

## 2023-03-08 RX ORDER — SODIUM, POTASSIUM,MAG SULFATES 17.5-3.13G
SOLUTION, RECONSTITUTED, ORAL ORAL
Qty: 1 EACH | Refills: 0 | Status: SHIPPED | OUTPATIENT
Start: 2023-03-08

## 2023-03-08 NOTE — TELEPHONE ENCOUNTER
Dr. James Abdi    Patient called to schedule 5 year recall colonoscopy. Please provide orders if ok to schedule directly. Thank you    Last Procedure, Date, MD:  Colonoscopy Dr. James Abdi 3/16/2018  Last Diagnosis:  Colon polyps x2, internal hemorrhoids  Recalled for (mth/yrs): 5 years  Sedation used previously:  MAC  Last Prep Used (if known):  Suprep  Quality of prep (if known): good  Anticoagulants: no  Diabetic Meds: no  Weight loss meds (Phentermine/Vyvanse): no  Iron supplement (RX/OTC): no  Height & Weight + BMI: 5'3\" 130 lbs BMI 23.03  Hx of Cardiac/CVA issues/(MI/Stroke): no  Devices Pacemaker/Defibrillator/Stents: no  Resp. Issues/Oxygen Use/OKSANA/COPD: no  Issues w/Anesthesia: no    Symptoms (Y/N): no  Symptoms Details: no    Special Comments/Notes: n/a    Please advise on orders and prep. Thank you.

## 2023-03-08 NOTE — TELEPHONE ENCOUNTER
Scheduled for:  Colonoscopy Al[facility]  Provider Name:    Date:  06/23/2023  Location:  [facility]  Sedation:  Mac  Time:  9:15am (pt is aware to arrive at 8:15am    Prep:  Suprep  Meds/Allergies Reconciled?:  Yes    Diagnosis with codes:  Hx Colon polyp Z86.010  Was patient informed to call insurance with codes (Y/N):  Yes     Referral sent?:  Referral was sent at the time of electronic surgical scheduling. [facility] or [phone]  notified?:  I sent an electronic request to Endo Scheduling and received a confirmation today. Medication Orders:  None  Misc Orders:  None     Further instructions given by staff: I discussed the prep instructions with the patient which she verbally understood and is aware that I will send the instructions today. via [employer]

## 2023-03-16 ENCOUNTER — OFFICE VISIT (OUTPATIENT)
Dept: INTERNAL MEDICINE CLINIC | Facility: CLINIC | Age: 38
End: 2023-03-16
Payer: COMMERCIAL

## 2023-03-16 VITALS
WEIGHT: 130 LBS | RESPIRATION RATE: 16 BRPM | BODY MASS INDEX: 23.04 KG/M2 | TEMPERATURE: 99 F | HEIGHT: 63 IN | OXYGEN SATURATION: 99 % | DIASTOLIC BLOOD PRESSURE: 72 MMHG | HEART RATE: 76 BPM | SYSTOLIC BLOOD PRESSURE: 110 MMHG

## 2023-03-16 DIAGNOSIS — S16.1XXA STRAIN OF STERNOCLEIDOMASTOID MUSCLE, INITIAL ENCOUNTER: Primary | ICD-10-CM

## 2023-03-16 PROCEDURE — 99213 OFFICE O/P EST LOW 20 MIN: CPT | Performed by: NURSE PRACTITIONER

## 2023-03-16 PROCEDURE — 3008F BODY MASS INDEX DOCD: CPT | Performed by: NURSE PRACTITIONER

## 2023-03-16 PROCEDURE — 3074F SYST BP LT 130 MM HG: CPT | Performed by: NURSE PRACTITIONER

## 2023-03-16 PROCEDURE — 3078F DIAST BP <80 MM HG: CPT | Performed by: NURSE PRACTITIONER

## 2023-03-16 RX ORDER — CYCLOBENZAPRINE HCL 10 MG
10 TABLET ORAL 3 TIMES DAILY PRN
Qty: 30 TABLET | Refills: 0 | Status: SHIPPED | OUTPATIENT
Start: 2023-03-16 | End: 2023-03-30

## 2023-03-16 RX ORDER — PREDNISONE 20 MG/1
40 TABLET ORAL DAILY
Qty: 14 TABLET | Refills: 0 | Status: SHIPPED | OUTPATIENT
Start: 2023-03-16 | End: 2023-03-23

## 2023-03-16 RX ORDER — CYCLOBENZAPRINE HCL 10 MG
10 TABLET ORAL AS NEEDED
COMMUNITY

## 2023-03-31 ENCOUNTER — OFFICE VISIT (OUTPATIENT)
Dept: OBGYN CLINIC | Facility: CLINIC | Age: 38
End: 2023-03-31

## 2023-03-31 ENCOUNTER — LAB ENCOUNTER (OUTPATIENT)
Dept: LAB | Age: 38
End: 2023-03-31
Attending: INTERNAL MEDICINE
Payer: COMMERCIAL

## 2023-03-31 VITALS
HEIGHT: 63 IN | WEIGHT: 128.38 LBS | DIASTOLIC BLOOD PRESSURE: 74 MMHG | BODY MASS INDEX: 22.75 KG/M2 | SYSTOLIC BLOOD PRESSURE: 122 MMHG

## 2023-03-31 DIAGNOSIS — Z00.00 LABORATORY EXAMINATION ORDERED AS PART OF A ROUTINE GENERAL MEDICAL EXAMINATION: ICD-10-CM

## 2023-03-31 DIAGNOSIS — Z01.419 WOMEN'S ANNUAL ROUTINE GYNECOLOGICAL EXAMINATION: ICD-10-CM

## 2023-03-31 DIAGNOSIS — E53.8 B12 DEFICIENCY: ICD-10-CM

## 2023-03-31 DIAGNOSIS — Z12.4 CERVICAL CANCER SCREENING: Primary | ICD-10-CM

## 2023-03-31 DIAGNOSIS — E55.9 VITAMIN D DEFICIENCY: ICD-10-CM

## 2023-03-31 LAB
ALBUMIN SERPL-MCNC: 3.8 G/DL (ref 3.4–5)
ALBUMIN/GLOB SERPL: 1 {RATIO} (ref 1–2)
ALP LIVER SERPL-CCNC: 48 U/L
ALT SERPL-CCNC: 25 U/L
ANION GAP SERPL CALC-SCNC: 5 MMOL/L (ref 0–18)
AST SERPL-CCNC: 14 U/L (ref 15–37)
BASOPHILS # BLD AUTO: 0.03 X10(3) UL (ref 0–0.2)
BASOPHILS NFR BLD AUTO: 0.4 %
BILIRUB SERPL-MCNC: 0.4 MG/DL (ref 0.1–2)
BILIRUB UR QL STRIP.AUTO: NEGATIVE
BUN BLD-MCNC: 16 MG/DL (ref 7–18)
CALCIUM BLD-MCNC: 8.9 MG/DL (ref 8.5–10.1)
CHLORIDE SERPL-SCNC: 108 MMOL/L (ref 98–112)
CHOLEST SERPL-MCNC: 150 MG/DL (ref ?–200)
CO2 SERPL-SCNC: 25 MMOL/L (ref 21–32)
COLOR UR AUTO: YELLOW
CREAT BLD-MCNC: 0.74 MG/DL
EOSINOPHIL # BLD AUTO: 0.19 X10(3) UL (ref 0–0.7)
EOSINOPHIL NFR BLD AUTO: 2.6 %
ERYTHROCYTE [DISTWIDTH] IN BLOOD BY AUTOMATED COUNT: 12.2 %
EST. AVERAGE GLUCOSE BLD GHB EST-MCNC: 111 MG/DL (ref 68–126)
FASTING PATIENT LIPID ANSWER: YES
FASTING STATUS PATIENT QL REPORTED: YES
GFR SERPLBLD BASED ON 1.73 SQ M-ARVRAT: 107 ML/MIN/1.73M2 (ref 60–?)
GLOBULIN PLAS-MCNC: 3.8 G/DL (ref 2.8–4.4)
GLUCOSE BLD-MCNC: 93 MG/DL (ref 70–99)
GLUCOSE UR STRIP.AUTO-MCNC: NEGATIVE MG/DL
HBA1C MFR BLD: 5.5 % (ref ?–5.7)
HCT VFR BLD AUTO: 37.7 %
HDLC SERPL-MCNC: 67 MG/DL (ref 40–59)
HGB BLD-MCNC: 13 G/DL
HYALINE CASTS #/AREA URNS AUTO: PRESENT /LPF
IMM GRANULOCYTES # BLD AUTO: 0.02 X10(3) UL (ref 0–1)
IMM GRANULOCYTES NFR BLD: 0.3 %
KETONES UR STRIP.AUTO-MCNC: NEGATIVE MG/DL
LDLC SERPL CALC-MCNC: 72 MG/DL (ref ?–100)
LYMPHOCYTES # BLD AUTO: 1.48 X10(3) UL (ref 1–4)
LYMPHOCYTES NFR BLD AUTO: 20.1 %
MCH RBC QN AUTO: 29.1 PG (ref 26–34)
MCHC RBC AUTO-ENTMCNC: 34.5 G/DL (ref 31–37)
MCV RBC AUTO: 84.3 FL
MONOCYTES # BLD AUTO: 0.55 X10(3) UL (ref 0.1–1)
MONOCYTES NFR BLD AUTO: 7.5 %
NEUTROPHILS # BLD AUTO: 5.09 X10 (3) UL (ref 1.5–7.7)
NEUTROPHILS # BLD AUTO: 5.09 X10(3) UL (ref 1.5–7.7)
NEUTROPHILS NFR BLD AUTO: 69.1 %
NITRITE UR QL STRIP.AUTO: NEGATIVE
NONHDLC SERPL-MCNC: 83 MG/DL (ref ?–130)
OSMOLALITY SERPL CALC.SUM OF ELEC: 287 MOSM/KG (ref 275–295)
PH UR STRIP.AUTO: 5 [PH] (ref 5–8)
PLATELET # BLD AUTO: 210 10(3)UL (ref 150–450)
POTASSIUM SERPL-SCNC: 3.9 MMOL/L (ref 3.5–5.1)
PROT SERPL-MCNC: 7.6 G/DL (ref 6.4–8.2)
PROT UR STRIP.AUTO-MCNC: 30 MG/DL
RBC # BLD AUTO: 4.47 X10(6)UL
RBC #/AREA URNS AUTO: >10 /HPF
SODIUM SERPL-SCNC: 138 MMOL/L (ref 136–145)
SP GR UR STRIP.AUTO: 1.02 (ref 1–1.03)
TRIGL SERPL-MCNC: 53 MG/DL (ref 30–149)
TSI SER-ACNC: 0.71 MIU/ML (ref 0.36–3.74)
UROBILINOGEN UR STRIP.AUTO-MCNC: <2 MG/DL
VIT B12 SERPL-MCNC: 483 PG/ML (ref 193–986)
VIT D+METAB SERPL-MCNC: 18 NG/ML (ref 30–100)
VLDLC SERPL CALC-MCNC: 8 MG/DL (ref 0–30)
WBC # BLD AUTO: 7.4 X10(3) UL (ref 4–11)

## 2023-03-31 PROCEDURE — 81001 URINALYSIS AUTO W/SCOPE: CPT

## 2023-03-31 PROCEDURE — 3078F DIAST BP <80 MM HG: CPT | Performed by: OBSTETRICS & GYNECOLOGY

## 2023-03-31 PROCEDURE — 3074F SYST BP LT 130 MM HG: CPT | Performed by: OBSTETRICS & GYNECOLOGY

## 2023-03-31 PROCEDURE — 99385 PREV VISIT NEW AGE 18-39: CPT | Performed by: OBSTETRICS & GYNECOLOGY

## 2023-03-31 PROCEDURE — 82306 VITAMIN D 25 HYDROXY: CPT

## 2023-03-31 PROCEDURE — 3008F BODY MASS INDEX DOCD: CPT | Performed by: OBSTETRICS & GYNECOLOGY

## 2023-03-31 PROCEDURE — 83036 HEMOGLOBIN GLYCOSYLATED A1C: CPT

## 2023-03-31 PROCEDURE — 82607 VITAMIN B-12: CPT

## 2023-03-31 PROCEDURE — 84443 ASSAY THYROID STIM HORMONE: CPT

## 2023-03-31 PROCEDURE — 36415 COLL VENOUS BLD VENIPUNCTURE: CPT

## 2023-03-31 PROCEDURE — 80061 LIPID PANEL: CPT

## 2023-03-31 PROCEDURE — 85025 COMPLETE CBC W/AUTO DIFF WBC: CPT

## 2023-03-31 PROCEDURE — 80053 COMPREHEN METABOLIC PANEL: CPT

## 2023-04-03 DIAGNOSIS — R82.90 ABNORMAL URINE FINDINGS: ICD-10-CM

## 2023-04-03 DIAGNOSIS — E55.9 VITAMIN D DEFICIENCY: Primary | ICD-10-CM

## 2023-04-03 RX ORDER — ERGOCALCIFEROL 1.25 MG/1
50000 CAPSULE ORAL WEEKLY
Qty: 12 CAPSULE | Refills: 0 | Status: SHIPPED | OUTPATIENT
Start: 2023-04-03

## 2023-04-06 ENCOUNTER — OFFICE VISIT (OUTPATIENT)
Dept: INTERNAL MEDICINE CLINIC | Facility: CLINIC | Age: 38
End: 2023-04-06
Payer: COMMERCIAL

## 2023-04-06 VITALS
HEIGHT: 63 IN | SYSTOLIC BLOOD PRESSURE: 118 MMHG | RESPIRATION RATE: 16 BRPM | TEMPERATURE: 98 F | OXYGEN SATURATION: 98 % | HEART RATE: 73 BPM | BODY MASS INDEX: 22.68 KG/M2 | WEIGHT: 128 LBS | DIASTOLIC BLOOD PRESSURE: 74 MMHG

## 2023-04-06 DIAGNOSIS — F41.9 ANXIETY: ICD-10-CM

## 2023-04-06 DIAGNOSIS — K12.0 RECURRENT APHTHOUS STOMATITIS: ICD-10-CM

## 2023-04-06 DIAGNOSIS — Z00.00 ANNUAL PHYSICAL EXAM: Primary | ICD-10-CM

## 2023-04-06 PROCEDURE — 3074F SYST BP LT 130 MM HG: CPT | Performed by: INTERNAL MEDICINE

## 2023-04-06 PROCEDURE — 3078F DIAST BP <80 MM HG: CPT | Performed by: INTERNAL MEDICINE

## 2023-04-06 PROCEDURE — 99395 PREV VISIT EST AGE 18-39: CPT | Performed by: INTERNAL MEDICINE

## 2023-04-06 PROCEDURE — 3008F BODY MASS INDEX DOCD: CPT | Performed by: INTERNAL MEDICINE

## 2023-04-06 RX ORDER — DEXAMETHASONE 0.5 MG/5ML
0.5 SOLUTION ORAL 3 TIMES DAILY
Qty: 240 ML | Refills: 0 | Status: SHIPPED | OUTPATIENT
Start: 2023-04-06

## 2023-04-06 RX ORDER — ALPRAZOLAM 0.5 MG/1
0.5 TABLET ORAL 2 TIMES DAILY PRN
Qty: 28 TABLET | Refills: 0 | Status: SHIPPED | OUTPATIENT
Start: 2023-04-06

## 2023-04-18 LAB — HPV I/H RISK 1 DNA SPEC QL NAA+PROBE: NEGATIVE

## 2023-06-05 ENCOUNTER — OFFICE VISIT (OUTPATIENT)
Dept: INTERNAL MEDICINE CLINIC | Facility: CLINIC | Age: 38
End: 2023-06-05
Payer: COMMERCIAL

## 2023-06-05 VITALS
WEIGHT: 128 LBS | HEIGHT: 63 IN | OXYGEN SATURATION: 99 % | BODY MASS INDEX: 22.68 KG/M2 | DIASTOLIC BLOOD PRESSURE: 72 MMHG | RESPIRATION RATE: 16 BRPM | HEART RATE: 72 BPM | SYSTOLIC BLOOD PRESSURE: 120 MMHG

## 2023-06-05 DIAGNOSIS — L98.9 SKIN LESION: ICD-10-CM

## 2023-06-05 DIAGNOSIS — G56.21 ULNAR NEUROPATHY AT ELBOW, RIGHT: Primary | ICD-10-CM

## 2023-06-05 PROCEDURE — 3074F SYST BP LT 130 MM HG: CPT | Performed by: INTERNAL MEDICINE

## 2023-06-05 PROCEDURE — 99214 OFFICE O/P EST MOD 30 MIN: CPT | Performed by: INTERNAL MEDICINE

## 2023-06-05 PROCEDURE — 3078F DIAST BP <80 MM HG: CPT | Performed by: INTERNAL MEDICINE

## 2023-06-05 PROCEDURE — 3008F BODY MASS INDEX DOCD: CPT | Performed by: INTERNAL MEDICINE

## 2023-06-05 RX ORDER — NAPROXEN 500 MG/1
500 TABLET ORAL 2 TIMES DAILY WITH MEALS
Qty: 28 TABLET | Refills: 0 | Status: SHIPPED | OUTPATIENT
Start: 2023-06-05

## 2023-06-13 ENCOUNTER — TELEPHONE (OUTPATIENT)
Facility: CLINIC | Age: 38
End: 2023-06-13

## 2023-06-20 ENCOUNTER — OFFICE VISIT (OUTPATIENT)
Dept: INTERNAL MEDICINE CLINIC | Facility: CLINIC | Age: 38
End: 2023-06-20
Payer: COMMERCIAL

## 2023-06-20 VITALS
WEIGHT: 128 LBS | HEIGHT: 63 IN | OXYGEN SATURATION: 99 % | RESPIRATION RATE: 16 BRPM | BODY MASS INDEX: 22.68 KG/M2 | HEART RATE: 78 BPM | SYSTOLIC BLOOD PRESSURE: 110 MMHG | TEMPERATURE: 98 F | DIASTOLIC BLOOD PRESSURE: 60 MMHG

## 2023-06-20 DIAGNOSIS — G56.21 ULNAR NEUROPATHY AT ELBOW, RIGHT: ICD-10-CM

## 2023-06-20 DIAGNOSIS — S46.811A TRAPEZIUS STRAIN, RIGHT, INITIAL ENCOUNTER: Primary | ICD-10-CM

## 2023-06-20 PROCEDURE — 99214 OFFICE O/P EST MOD 30 MIN: CPT | Performed by: PHYSICIAN ASSISTANT

## 2023-06-20 PROCEDURE — 3008F BODY MASS INDEX DOCD: CPT | Performed by: PHYSICIAN ASSISTANT

## 2023-06-20 PROCEDURE — 3074F SYST BP LT 130 MM HG: CPT | Performed by: PHYSICIAN ASSISTANT

## 2023-06-20 PROCEDURE — 3078F DIAST BP <80 MM HG: CPT | Performed by: PHYSICIAN ASSISTANT

## 2023-06-20 RX ORDER — PREDNISONE 20 MG/1
40 TABLET ORAL DAILY
Qty: 14 TABLET | Refills: 0 | Status: SHIPPED | OUTPATIENT
Start: 2023-06-20 | End: 2023-06-27

## 2023-06-20 RX ORDER — CYCLOBENZAPRINE HCL 10 MG
10 TABLET ORAL NIGHTLY
Qty: 7 TABLET | Refills: 0 | Status: SHIPPED | OUTPATIENT
Start: 2023-06-20

## 2023-06-20 NOTE — PATIENT INSTRUCTIONS
Take prednisone and cyclobenzaprine as directed  If you get stomach upset, heartburn from the prednisone: ok to add pepcid over-the-counter once a day as needed

## 2023-06-26 ENCOUNTER — PATIENT MESSAGE (OUTPATIENT)
Dept: INTERNAL MEDICINE CLINIC | Facility: CLINIC | Age: 38
End: 2023-06-26

## 2023-06-26 NOTE — TELEPHONE ENCOUNTER
From: Tonita Seip  To: Ramirez Ochoa MD  Sent: 6/26/2023 9:31 AM CDT  Subject: Vitamin D Test    Good morning,  I finished my vitamin D RX and I was told I needed a test to check my levels. Can I have an order request to check them? Thanks!   Marlys Rodriguez

## 2023-07-01 ENCOUNTER — LAB ENCOUNTER (OUTPATIENT)
Dept: LAB | Age: 38
End: 2023-07-01
Attending: NURSE PRACTITIONER
Payer: COMMERCIAL

## 2023-07-01 DIAGNOSIS — E55.9 VITAMIN D DEFICIENCY: ICD-10-CM

## 2023-07-01 LAB — VIT D+METAB SERPL-MCNC: 69.2 NG/ML (ref 30–100)

## 2023-07-01 PROCEDURE — 82306 VITAMIN D 25 HYDROXY: CPT | Performed by: NURSE PRACTITIONER

## 2023-07-14 ENCOUNTER — TELEPHONE (OUTPATIENT)
Dept: ORTHOPEDICS CLINIC | Facility: CLINIC | Age: 38
End: 2023-07-14

## 2023-07-14 DIAGNOSIS — M25.521 RIGHT ELBOW PAIN: Primary | ICD-10-CM

## 2023-07-14 NOTE — TELEPHONE ENCOUNTER
Reviewed patients chart, xray orders are required. Order placed for right elbow xrays.    Please contact patient advise to arrive 15 mins prior to patients appt to complete x-ray order and schedule patients xray appt-Thank you

## 2023-07-15 ENCOUNTER — HOSPITAL ENCOUNTER (OUTPATIENT)
Dept: GENERAL RADIOLOGY | Age: 38
Discharge: HOME | End: 2023-07-15
Attending: PHYSICIAN ASSISTANT
Payer: COMMERCIAL

## 2023-07-15 DIAGNOSIS — M25.521 RIGHT ELBOW PAIN: ICD-10-CM

## 2023-07-15 PROCEDURE — 73080 X-RAY EXAM OF ELBOW: CPT | Performed by: PHYSICIAN ASSISTANT

## 2023-07-19 ENCOUNTER — OFFICE VISIT (OUTPATIENT)
Dept: ORTHOPEDICS CLINIC | Facility: CLINIC | Age: 38
End: 2023-07-19
Payer: COMMERCIAL

## 2023-07-19 VITALS — WEIGHT: 128 LBS | HEIGHT: 63 IN | BODY MASS INDEX: 22.68 KG/M2

## 2023-07-19 DIAGNOSIS — M77.11 LATERAL EPICONDYLITIS OF RIGHT ELBOW: Primary | ICD-10-CM

## 2023-07-19 DIAGNOSIS — G56.22 NEURITIS OF LEFT ULNAR NERVE: ICD-10-CM

## 2023-07-19 PROCEDURE — 3008F BODY MASS INDEX DOCD: CPT | Performed by: PHYSICIAN ASSISTANT

## 2023-07-19 PROCEDURE — 99213 OFFICE O/P EST LOW 20 MIN: CPT | Performed by: PHYSICIAN ASSISTANT

## 2023-08-02 ENCOUNTER — MED REC SCAN ONLY (OUTPATIENT)
Dept: ORTHOPEDICS CLINIC | Facility: CLINIC | Age: 38
End: 2023-08-02

## 2023-08-16 ENCOUNTER — HOSPITAL ENCOUNTER (OUTPATIENT)
Dept: GENERAL RADIOLOGY | Age: 38
Discharge: HOME | End: 2023-08-16
Attending: PHYSICIAN ASSISTANT
Payer: COMMERCIAL

## 2023-08-16 DIAGNOSIS — M54.2 CERVICAL PAIN: ICD-10-CM

## 2023-08-16 PROCEDURE — 72052 X-RAY EXAM NECK SPINE 6/>VWS: CPT | Performed by: PHYSICIAN ASSISTANT

## 2023-10-16 DIAGNOSIS — K12.0 RECURRENT APHTHOUS STOMATITIS: ICD-10-CM

## 2023-10-16 RX ORDER — DEXAMETHASONE 0.5 MG/5ML
0.5 SOLUTION ORAL 3 TIMES DAILY
Qty: 240 ML | Refills: 0 | Status: SHIPPED | OUTPATIENT
Start: 2023-10-16

## 2023-10-16 NOTE — TELEPHONE ENCOUNTER
Last time medication was refilled 04/06/2023  Quantity and # of refills 240 ml w 0  Last OV 06/20/2023  Next OV No appointment scheduled      Sent to Dr. Irma Rivera for approval

## 2023-10-27 ENCOUNTER — OFFICE VISIT (OUTPATIENT)
Dept: INTERNAL MEDICINE CLINIC | Facility: CLINIC | Age: 38
End: 2023-10-27

## 2023-10-27 VITALS
BODY MASS INDEX: 22.5 KG/M2 | TEMPERATURE: 98 F | DIASTOLIC BLOOD PRESSURE: 70 MMHG | SYSTOLIC BLOOD PRESSURE: 100 MMHG | HEART RATE: 77 BPM | RESPIRATION RATE: 16 BRPM | OXYGEN SATURATION: 100 % | WEIGHT: 127 LBS | HEIGHT: 63 IN

## 2023-10-27 DIAGNOSIS — R10.32 LLQ ABDOMINAL PAIN: Primary | ICD-10-CM

## 2023-10-27 PROCEDURE — 99214 OFFICE O/P EST MOD 30 MIN: CPT | Performed by: INTERNAL MEDICINE

## 2023-10-27 PROCEDURE — 3008F BODY MASS INDEX DOCD: CPT | Performed by: INTERNAL MEDICINE

## 2023-10-27 PROCEDURE — 3074F SYST BP LT 130 MM HG: CPT | Performed by: INTERNAL MEDICINE

## 2023-10-27 PROCEDURE — 3078F DIAST BP <80 MM HG: CPT | Performed by: INTERNAL MEDICINE

## 2023-10-27 NOTE — PROGRESS NOTES
Subjective:   Patient ID: Michelle Patel is a 41year old female. Abdominal Pain  This is a new problem. The current episode started in the past 7 days. The onset quality is sudden. The problem occurs constantly. The problem has been rapidly improving. The pain is located in the LUQ. The pain is moderate. The quality of the pain is sharp and dull. The abdominal pain does not radiate. Pertinent negatives include no anorexia, arthralgias, constipation, diarrhea, dysuria, fever, flatus, frequency, headaches, hematochezia, hematuria, melena, myalgias, nausea, vomiting or weight loss. The pain is aggravated by palpation. The pain is relieved by Sitting up. She has tried acetaminophen for the symptoms. The treatment provided mild relief. History/Other:   Review of Systems   Constitutional:  Negative for fever and weight loss. Gastrointestinal:  Positive for abdominal pain. Negative for anorexia, constipation, diarrhea, flatus, hematochezia, melena, nausea and vomiting. Genitourinary:  Negative for dysuria, frequency and hematuria. Musculoskeletal:  Negative for arthralgias and myalgias. Neurological:  Negative for headaches. All other systems reviewed and are negative. Current Outpatient Medications   Medication Sig Dispense Refill    dexamethasone 0.5 MG/5ML Oral Solution Take 5 mL (0.5 mg total) by mouth 3 (three) times daily. 240 mL 0    ALPRAZolam (XANAX) 0.5 MG Oral Tab Take 1 tablet (0.5 mg total) by mouth 2 (two) times daily as needed for Anxiety. 28 tablet 0    albuterol 108 (90 Base) MCG/ACT Inhalation Aero Soln Inhale 2 puffs into the lungs every 6 (six) hours as needed for Wheezing. 1 each 0    Multiple Vitamin (MULTIVITAMIN ADULT OR) Take by mouth daily. Allergies:  Azithromycin            DIARRHEA    Comment:Stomach upset  Bactrim [Sulfametho*    DIARRHEA    Comment:Stomach upset    Objective:   Physical Exam  Vitals and nursing note reviewed.    Constitutional:       General: She is not in acute distress. Appearance: Normal appearance. She is not ill-appearing. HENT:      Head: Normocephalic and atraumatic. Eyes:      General: No scleral icterus. Extraocular Movements: Extraocular movements intact. Conjunctiva/sclera: Conjunctivae normal.      Pupils: Pupils are equal, round, and reactive to light. Cardiovascular:      Rate and Rhythm: Normal rate and regular rhythm. Heart sounds: Normal heart sounds. Pulmonary:      Effort: Pulmonary effort is normal.      Breath sounds: Normal breath sounds. Abdominal:      General: Abdomen is flat. There is no distension. Palpations: Abdomen is soft. Tenderness: There is abdominal tenderness in the left upper quadrant. There is guarding. There is no right CVA tenderness or left CVA tenderness. Musculoskeletal:      Cervical back: Normal range of motion. Neurological:      Mental Status: She is alert. Assessment & Plan:   LLQ abdominal pain  (primary encounter diagnosis)  - check US abd  - cont supportive care  No orders of the defined types were placed in this encounter.       Meds This Visit:  Requested Prescriptions      No prescriptions requested or ordered in this encounter       Imaging & Referrals:  None

## 2023-11-07 ENCOUNTER — TELEPHONE (OUTPATIENT)
Dept: INTERNAL MEDICINE CLINIC | Facility: CLINIC | Age: 38
End: 2023-11-07

## 2023-11-07 NOTE — TELEPHONE ENCOUNTER
Ultrasound abdomen results received from [facility]. DOS:  11/6/23  Results:  No significant sonographic abnormality noted. Dr. Yusra Templeton reviewed. D/w pt results.

## 2023-11-08 ENCOUNTER — TELEPHONE (OUTPATIENT)
Facility: CLINIC | Age: 38
End: 2023-11-08

## 2023-11-08 NOTE — TELEPHONE ENCOUNTER
Pt is calling find out if she can mix the PREP with something other than water.   Please call    CLN 11/9/23

## 2023-11-08 NOTE — TELEPHONE ENCOUNTER
Patient contacted. I let her know she can only mix it with water. However, she can drink all of the clear liquids she wants along with the prep so if she does not like the taste she can drink some of the prep then have a sip of something she likes like Gatorade or juice. I let her know ok to make it cold if that is easier for her as well.

## 2023-11-09 ENCOUNTER — HOSPITAL ENCOUNTER (OUTPATIENT)
Facility: HOSPITAL | Age: 38
Setting detail: HOSPITAL OUTPATIENT SURGERY
Discharge: HOME | End: 2023-11-09
Attending: INTERNAL MEDICINE | Admitting: INTERNAL MEDICINE
Payer: COMMERCIAL

## 2023-11-09 ENCOUNTER — ANESTHESIA EVENT (OUTPATIENT)
Dept: ENDOSCOPY | Facility: HOSPITAL | Age: 38
End: 2023-11-09
Payer: COMMERCIAL

## 2023-11-09 ENCOUNTER — ANESTHESIA (OUTPATIENT)
Dept: ENDOSCOPY | Facility: HOSPITAL | Age: 38
End: 2023-11-09
Payer: COMMERCIAL

## 2023-11-09 VITALS
TEMPERATURE: 98 F | BODY MASS INDEX: 22.68 KG/M2 | HEIGHT: 63 IN | RESPIRATION RATE: 12 BRPM | HEART RATE: 70 BPM | OXYGEN SATURATION: 100 % | WEIGHT: 128 LBS | SYSTOLIC BLOOD PRESSURE: 100 MMHG | DIASTOLIC BLOOD PRESSURE: 72 MMHG

## 2023-11-09 DIAGNOSIS — Z86.010 HX OF COLONIC POLYPS: ICD-10-CM

## 2023-11-09 LAB — B-HCG UR QL: NEGATIVE

## 2023-11-09 PROCEDURE — 0DBK8ZX EXCISION OF ASCENDING COLON, VIA NATURAL OR ARTIFICIAL OPENING ENDOSCOPIC, DIAGNOSTIC: ICD-10-PCS | Performed by: INTERNAL MEDICINE

## 2023-11-09 PROCEDURE — 45385 COLONOSCOPY W/LESION REMOVAL: CPT | Performed by: INTERNAL MEDICINE

## 2023-11-09 RX ORDER — LIDOCAINE HYDROCHLORIDE 10 MG/ML
INJECTION, SOLUTION EPIDURAL; INFILTRATION; INTRACAUDAL; PERINEURAL AS NEEDED
Status: DISCONTINUED | OUTPATIENT
Start: 2023-11-09 | End: 2023-11-09 | Stop reason: SURG

## 2023-11-09 RX ORDER — SODIUM CHLORIDE, SODIUM LACTATE, POTASSIUM CHLORIDE, CALCIUM CHLORIDE 600; 310; 30; 20 MG/100ML; MG/100ML; MG/100ML; MG/100ML
INJECTION, SOLUTION INTRAVENOUS CONTINUOUS
Status: DISCONTINUED | OUTPATIENT
Start: 2023-11-09 | End: 2023-11-09

## 2023-11-09 RX ADMIN — LIDOCAINE HYDROCHLORIDE 50 MG: 10 INJECTION, SOLUTION EPIDURAL; INFILTRATION; INTRACAUDAL; PERINEURAL at 11:27:00

## 2023-11-09 NOTE — OPERATIVE REPORT
[facility] Endoscopy Report  Date of procedure-November 9, 2023    Preoperative Diagnosis:  -Colorectal Screening  -History of colon polyps      Postoperative Diagnosis:  -Colon polyp x1  -Small internal hemorrhoids      Procedure:    Colonoscopy       Surgeon:  Zabrina Wood M.D. Anesthesia:  MAC     Technique:  After informed consent, the patient was placed in the left lateral recumbent position. Digital rectal examination revealed no palpable intraluminal abnormalities. An Olympus variable stiffness 190 series HD colonoscope was inserted into the rectum and advanced under direct vision by following the lumen to the cecum. The colon was examined upon withdrawal in the left lateral position. The procedure was well tolerated without immediate complication. Findings:  The preparation of the colon was good. The terminal ileum was examined for 5 cm and visually normal.  The ileocecal valve was well preserved. The visualized colonic mucosa from the cecum to the anal verge was normal with an intact vascular pattern. Ascending colon polyp x1, this was sessile approximately 3 to 4 mm in size and cold snare removed. No bleeding was noted polypectomy site and specimens retrieved and sent for analysis. Small internal hemorrhoids noted on retroflexed view. Estimated blood loss-insignificant  Specimens-see above      Impression:  -Colon polyp x1  -Small internal hemorrhoids    Recommendations:  - Post polypectomy instructions given  - Repeat colonoscopy in 5- 7 years  - Symptomatic treatment of hemorrhoids          Natali Rice.  Acacia Sanchez MD  11/9/2023  11:48 AM
55ft

## 2023-11-09 NOTE — DISCHARGE INSTRUCTIONS

## 2023-11-09 NOTE — H&P
History & Physical Examination    Patient Name: Chanell Chandler  MRN: [id number]  CSN: [id number]  YOB: 1985    Diagnosis:   CRC screening   Hx colon polyps       Medications Prior to Admission   Medication Sig Dispense Refill Last Dose    dexamethasone 0.5 MG/5ML Oral Solution Take 5 mL (0.5 mg total) by mouth 3 (three) times daily. 240 mL 0 PRN    ALPRAZolam (XANAX) 0.5 MG Oral Tab Take 1 tablet (0.5 mg total) by mouth 2 (two) times daily as needed for Anxiety. 28 tablet 0 2023    Multiple Vitamin (MULTIVITAMIN ADULT OR) Take by mouth daily. 2023    albuterol 108 (90 Base) MCG/ACT Inhalation Aero Soln Inhale 2 puffs into the lungs every 6 (six) hours as needed for Wheezing. (Patient not taking: Reported on 2023) 1 each 0 Not Taking     Current Facility-Administered Medications   Medication Dose Route Frequency    lactated ringers infusion   Intravenous Continuous       Allergies:    Allergies   Allergen Reactions    Azithromycin DIARRHEA     Stomach upset    Bactrim [Sulfamethoxazole W/Trimethoprim, Co-Trimoxazole] DIARRHEA     Stomach upset       Past Medical History:   Diagnosis Date    Allergic rhinitis - cat,ragweed,weeds,dust mites 2013    Anemia     during pregnancy    Anxiety     no meds    Asthma     B12 deficiency 2015    Cyst of ovary, left 2017    Depression     19-22yo, not treated with meds, depression & anxiety    Easy bruising 2017    Exercise-induced asthma     TMJ (temporomandibular joint syndrome)     Vitamin D deficiency 09/15/2015     Past Surgical History:   Procedure Laterality Date      2015, 2016    COLONOSCOPY N/A 3/16/2018    Procedure: COLONOSCOPY;  Surgeon: Gerri Arellano MD;  Location: [facility] ENDOSCOPY    SPECIAL SERVICE OR REPORT      warts removed on rt foot    TUBAL LIGATION       Family History   Problem Relation Age of Onset    Heart Disorder Paternal Grandfather     High Cholesterol Paternal Grandfather Other (Other) Maternal Grandmother         osteoarthritis     Hypertension Maternal Grandfather     Cancer Maternal Grandfather         lung    High Cholesterol Paternal Grandmother     Heart Disorder Father     Cancer Maternal Aunt 48        colon ca     Social History     Tobacco Use    Smoking status: Never    Smokeless tobacco: Never   Substance Use Topics    Alcohol use: Yes     Comment: social        SYSTEM Check if Review is Normal Check if Physical Exam is Normal If not normal, please explain:   HEENT [x ] [ x]    NECK & BACK [x ] [x ]    HEART [x ] [ x]    LUNGS [x ] [ x]    ABDOMEN [x ] [x ]    UROGENITAL [ ] [ ]    EXTREMITIES [x ] [x ]    OTHER        [ x ] I have discussed the risks and benefits and alternatives with the patient/family. They understand and agree to proceed with plan of care. [ x ] I have reviewed the History and Physical done within the last 30 days. Any changes noted above. Erin Morris.  Rin Bang MD  11/9/2023  11:09 AM

## 2023-11-10 ENCOUNTER — MED REC SCAN ONLY (OUTPATIENT)
Facility: CLINIC | Age: 38
End: 2023-11-10

## 2024-02-08 ENCOUNTER — HOSPITAL ENCOUNTER (OUTPATIENT)
Age: 39
Discharge: HOME | End: 2024-02-08
Payer: COMMERCIAL

## 2024-02-08 VITALS
OXYGEN SATURATION: 99 % | RESPIRATION RATE: 18 BRPM | TEMPERATURE: 98 F | SYSTOLIC BLOOD PRESSURE: 125 MMHG | HEART RATE: 74 BPM | DIASTOLIC BLOOD PRESSURE: 75 MMHG

## 2024-02-08 DIAGNOSIS — S86.812A STRAIN OF CALF MUSCLE, LEFT, INITIAL ENCOUNTER: Primary | ICD-10-CM

## 2024-02-08 LAB — DDIMER WHOLE BLOOD: <200 NG/ML DDU (ref ?–400)

## 2024-02-08 PROCEDURE — 36415 COLL VENOUS BLD VENIPUNCTURE: CPT

## 2024-02-08 PROCEDURE — 99213 OFFICE O/P EST LOW 20 MIN: CPT

## 2024-02-08 PROCEDURE — 85378 FIBRIN DEGRADE SEMIQUANT: CPT | Performed by: PHYSICIAN ASSISTANT

## 2024-02-09 NOTE — ED PROVIDER NOTES
Patient Seen in: [facility]      History     Chief Complaint   Patient presents with    Leg Pain     Stated Complaint: Calf/leg pain    Subjective:   HPI    38-year-old well-appearing female presents with left calf pain that started less than 24 hours prior to arrival.  Patient reports doing a leg workout yesterday morning however reports acute onset left calf pain that started over 12 hours prior to arrival.  No use of OCPs, recent travel, recent surgery.  PERC/Wells criteria negative.    Objective:   Past Medical History:   Diagnosis Date    Allergic rhinitis - cat,ragweed,weeds,dust mites 2013    Anemia     during pregnancy    Anxiety     no meds    Asthma     B12 deficiency 2015    Cyst of ovary, left 2017    Depression     18-22yo, not treated with meds, depression & anxiety    Easy bruising 2017    Exercise-induced asthma     TMJ (temporomandibular joint syndrome)     Vitamin D deficiency 09/15/2015              Past Surgical History:   Procedure Laterality Date      2015, 2016    COLONOSCOPY N/A 3/16/2018    Procedure: COLONOSCOPY;  Surgeon: Jayjay Lozoya MD;  Location: [facility] ENDOSCOPY    COLONOSCOPY N/A 2023    Procedure: COLONOSCOPY;  Surgeon: Jayjay Lozoya MD;  Location: [facility] ENDOSCOPY    SPECIAL SERVICE OR REPORT      warts removed on rt foot    TUBAL LIGATION                  Social History     Socioeconomic History    Marital status:     Number of children: 2   Occupational History    Occupation:    Tobacco Use    Smoking status: Never    Smokeless tobacco: Never   Vaping Use    Vaping Use: Never used   Substance and Sexual Activity    Alcohol use: Yes     Comment: social     Drug use: No    Sexual activity: Yes     Partners: Male     Birth control/protection: Tubal Ligation   Other Topics Concern    Caffeine Concern Yes     Comment: 2 cups a day    Exercise No     Comment: 5x a week    Seat Belt Yes               Review of Systems   All other systems reviewed and are negative.      Positive for stated complaint: Calf/leg pain  Other systems are as noted in HPI.  Constitutional and vital signs reviewed.      All other systems reviewed and negative except as noted above.    Physical Exam     ED Triage Vitals   BP 02/08/24 1759 125/75   Pulse 02/08/24 1757 74   Resp 02/08/24 1757 18   Temp 02/08/24 1757 98.4 °F (36.9 °C)   Temp src 02/08/24 1757 Temporal   SpO2 02/08/24 1757 99 %   O2 Device 02/08/24 1757 None (Room air)       Current:/75   Pulse 74   Temp 98.4 °F (36.9 °C) (Temporal)   Resp 18   LMP 01/26/2024 (Exact Date)   SpO2 99%         Physical Exam  Vitals and nursing note reviewed.   Constitutional:       General: She is not in acute distress.     Appearance: Normal appearance. She is normal weight. She is not ill-appearing, toxic-appearing or diaphoretic.   HENT:      Head: Normocephalic and atraumatic.      Right Ear: External ear normal.      Left Ear: External ear normal.      Nose: Nose normal.   Eyes:      Extraocular Movements: Extraocular movements intact.      Conjunctiva/sclera: Conjunctivae normal.      Pupils: Pupils are equal, round, and reactive to light.   Pulmonary:      Effort: Pulmonary effort is normal. No respiratory distress.   Musculoskeletal:         General: No swelling, tenderness, deformity or signs of injury. Normal range of motion.      Cervical back: Normal range of motion and neck supple.      Right lower leg: No edema.      Left lower leg: No edema.      Comments: Negative Homans signs bilaterally   Skin:     General: Skin is warm and dry.      Capillary Refill: Capillary refill takes less than 2 seconds.      Coloration: Skin is not jaundiced or pale.      Findings: No bruising, erythema, lesion or rash.   Neurological:      General: No focal deficit present.      Mental Status: She is alert and oriented to person, place, and time. Mental status is at baseline.       Gait: Gait normal.   Psychiatric:         Mood and Affect: Mood normal.         Behavior: Behavior normal.               ED Course     Labs Reviewed   D-DIMER (POC) - Normal   Wells Criteria   Active cancer Surgery  Bed bound Calf swelling  >3cm Collateral veins present Entire leg swollen Calf tenderness Immobilization of lower extremity Previous DVT Likelihood of  DVT diagnosis        NO NO NO NO NO NO NO NO       PERC criteria   Age > 50: HR> 100:  SpO2% < 95%  Unilateral leg swelling?  Hemoptysis:  Prior DVT/ PE:  Hormone Use:   NO NO NO NO NO NO NO                          [facility]                                        Medical Decision Making  38-year-old well-appearing female, who is both PERC/Wells criteria negative, presents with left calf strain after doing a leg workout over 24 hours prior to arrival  Plan  -Point-of-care D-dimer  -Reassess  -Discharged home  -Refer to PCP for further evaluation  -RICE    Amount and/or Complexity of Data Reviewed  Labs: ordered. Decision-making details documented in ED Course.     Details: [id number]        Disposition and Plan     Clinical Impression:  1. Strain of calf muscle, left, initial encounter         Disposition:  Discharge  2/8/2024  6:27 pm    Follow-up:  Harshil Olmstead MD  [address] [place] [id number]  [phone]          [facility]  [address] [facility] [address]  [phone]              Medications Prescribed:  Current Discharge Medication List

## 2024-02-26 ENCOUNTER — OFFICE VISIT (OUTPATIENT)
Dept: INTERNAL MEDICINE CLINIC | Facility: CLINIC | Age: 39
End: 2024-02-26
Payer: COMMERCIAL

## 2024-02-26 VITALS
DIASTOLIC BLOOD PRESSURE: 70 MMHG | SYSTOLIC BLOOD PRESSURE: 110 MMHG | HEART RATE: 77 BPM | RESPIRATION RATE: 16 BRPM | TEMPERATURE: 97 F | BODY MASS INDEX: 22.86 KG/M2 | OXYGEN SATURATION: 99 % | WEIGHT: 129 LBS | HEIGHT: 63 IN

## 2024-02-26 DIAGNOSIS — H66.91 ACUTE OTITIS MEDIA, RIGHT: Primary | ICD-10-CM

## 2024-02-26 PROCEDURE — 99213 OFFICE O/P EST LOW 20 MIN: CPT | Performed by: PHYSICIAN ASSISTANT

## 2024-02-26 PROCEDURE — 3074F SYST BP LT 130 MM HG: CPT | Performed by: PHYSICIAN ASSISTANT

## 2024-02-26 PROCEDURE — 3008F BODY MASS INDEX DOCD: CPT | Performed by: PHYSICIAN ASSISTANT

## 2024-02-26 PROCEDURE — 3078F DIAST BP <80 MM HG: CPT | Performed by: PHYSICIAN ASSISTANT

## 2024-02-26 RX ORDER — AMOXICILLIN AND CLAVULANATE POTASSIUM 875; 125 MG/1; MG/1
1 TABLET, FILM COATED ORAL 2 TIMES DAILY
Qty: 20 TABLET | Refills: 0 | Status: SHIPPED
Start: 2024-02-26 | End: 2024-03-07

## 2024-02-26 NOTE — PROGRESS NOTES
Serenity Spears is a 38 year old female.  HPI:   Pt c/o URI symptoms: ears feel clogged, congestion, postnasal drainage > 2 wks  Otc cough/cold meds not helping  Current Outpatient Medications   Medication Sig Dispense Refill    FIBER OR Take by mouth.      amoxicillin clavulanate 875-125 MG Oral Tab Take 1 tablet by mouth 2 (two) times daily for 10 days. 20 tablet 0    ALPRAZolam (XANAX) 0.5 MG Oral Tab Take 1 tablet (0.5 mg total) by mouth 2 (two) times daily as needed for Anxiety. 28 tablet 0    Multiple Vitamin (MULTIVITAMIN ADULT OR) Take by mouth daily.      dexamethasone 0.5 MG/5ML Oral Solution Take 5 mL (0.5 mg total) by mouth 3 (three) times daily. (Patient not taking: Reported on 2/8/2024) 240 mL 0    albuterol 108 (90 Base) MCG/ACT Inhalation Aero Soln Inhale 2 puffs into the lungs every 6 (six) hours as needed for Wheezing. (Patient not taking: Reported on 11/2/2023) 1 each 0      Past Medical History:   Diagnosis Date    Allergic rhinitis - cat,ragweed,weeds,dust mites 09/04/2013    Anemia     during pregnancy    Anxiety     no meds    Asthma (HCC)     B12 deficiency 07/06/2015    Cyst of ovary, left 06/16/2017    Depression     18-20yo, not treated with meds, depression & anxiety    Easy bruising 06/19/2017    Exercise-induced asthma (HCC)     TMJ (temporomandibular joint syndrome)     Vitamin D deficiency 09/15/2015      Social History:  Social History     Socioeconomic History    Marital status:     Number of children: 2   Occupational History    Occupation:    Tobacco Use    Smoking status: Never    Smokeless tobacco: Never   Vaping Use    Vaping Use: Never used   Substance and Sexual Activity    Alcohol use: Yes     Comment: social     Drug use: No    Sexual activity: Yes     Partners: Male     Birth control/protection: Tubal Ligation   Other Topics Concern    Caffeine Concern Yes     Comment: 2 cups a day    Exercise No     Comment: 5x a week    Seat Belt Yes        REVIEW OF  SYSTEMS:   GENERAL HEALTH: feels well otherwise. Denies fever, chills, unintentional weight change  SKIN: denies any unusual skin lesions or rashes  RESPIRATORY: denies shortness of breath with exertion, denies wheezing. Mild cough.  CARDIOVASCULAR: denies chest pain or palpitations, denies leg swelling  GI: denies abdominal pain and denies heartburn. Denies nausea, vomiting, diarrhea, constipation  NEURO: denies headaches, dizziness, weakness, syncope    EXAM:   /70   Pulse 77   Temp 97.2 °F (36.2 °C)   Resp 16   Ht 5' 3\" (1.6 m)   Wt 129 lb (58.5 kg)   LMP 02/23/2024 (Exact Date)   SpO2 99%   BMI 22.85 kg/m²   GENERAL: well developed, well nourished,in no apparent distress  SKIN: no rashes,no suspicious lesions, warm and dry  HEENT: atraumatic, normocephalic, throat is clear  Right TM erythematous, + serous effusion. Left TM + effusion, no erythema. No perforation or bulging b/l  NECK: supple,no adenopathy, no thyromegaly  LUNGS: clear to auscultation b/l no W/R/R  CARDIO: RRR without murmur  GI: good BS's,no masses, HSM, distension or tenderness  EXTREMITIES: no cyanosis, clubbing or edema  MUSCULOSKELETAL: FROM, no joint swelling or bony tenderness  NEURO: a/ox3, no focal deficits  PSYCH: mood and affect normal    ASSESSMENT AND PLAN:   1. Acute otitis media, right (Primary)  -     Amoxicillin-Pot Clavulanate; Take 1 tablet by mouth 2 (two) times daily for 10 days.  Dispense: 20 tablet; Refill: 0      -add flonase otc  The patient indicates understanding of these issues and agrees to the plan.  Return if symptoms worsen or fail to improve.

## 2024-02-27 ENCOUNTER — MED REC SCAN ONLY (OUTPATIENT)
Dept: INTERNAL MEDICINE CLINIC | Facility: CLINIC | Age: 39
End: 2024-02-27

## 2024-03-01 ENCOUNTER — LAB ENCOUNTER (OUTPATIENT)
Dept: LAB | Age: 39
End: 2024-03-01
Attending: INTERNAL MEDICINE
Payer: COMMERCIAL

## 2024-03-01 DIAGNOSIS — E53.8 B12 DEFICIENCY: ICD-10-CM

## 2024-03-01 DIAGNOSIS — E55.9 VITAMIN D DEFICIENCY: ICD-10-CM

## 2024-03-01 LAB
VIT B12 SERPL-MCNC: 551 PG/ML (ref 193–986)
VIT D+METAB SERPL-MCNC: 19 NG/ML (ref 30–100)

## 2024-03-01 PROCEDURE — 82306 VITAMIN D 25 HYDROXY: CPT

## 2024-03-01 PROCEDURE — 36415 COLL VENOUS BLD VENIPUNCTURE: CPT

## 2024-03-01 PROCEDURE — 82607 VITAMIN B-12: CPT

## 2024-03-04 DIAGNOSIS — E55.9 VITAMIN D DEFICIENCY: Primary | ICD-10-CM

## 2024-03-04 RX ORDER — ERGOCALCIFEROL 1.25 MG/1
1 CAPSULE ORAL WEEKLY
Qty: 12 CAPSULE | Refills: 0 | Status: SHIPPED | OUTPATIENT
Start: 2024-03-04

## 2024-03-21 ENCOUNTER — LAB ENCOUNTER (OUTPATIENT)
Dept: LAB | Age: 39
End: 2024-03-21
Attending: INTERNAL MEDICINE
Payer: COMMERCIAL

## 2024-03-21 DIAGNOSIS — Z00.00 LABORATORY EXAMINATION ORDERED AS PART OF A ROUTINE GENERAL MEDICAL EXAMINATION: ICD-10-CM

## 2024-03-21 LAB
ALBUMIN SERPL-MCNC: 4.1 G/DL (ref 3.4–5)
ALBUMIN/GLOB SERPL: 1.1 {RATIO} (ref 1–2)
ALP LIVER SERPL-CCNC: 57 U/L
ALT SERPL-CCNC: 18 U/L
ANION GAP SERPL CALC-SCNC: 4 MMOL/L (ref 0–18)
AST SERPL-CCNC: 17 U/L (ref 15–37)
BASOPHILS # BLD AUTO: 0.03 X10(3) UL (ref 0–0.2)
BASOPHILS NFR BLD AUTO: 0.6 %
BILIRUB SERPL-MCNC: 0.4 MG/DL (ref 0.1–2)
BUN BLD-MCNC: 15 MG/DL (ref 9–23)
CALCIUM BLD-MCNC: 9.2 MG/DL (ref 8.5–10.1)
CHLORIDE SERPL-SCNC: 109 MMOL/L (ref 98–112)
CHOLEST SERPL-MCNC: 148 MG/DL (ref ?–200)
CO2 SERPL-SCNC: 27 MMOL/L (ref 21–32)
CREAT BLD-MCNC: 0.89 MG/DL
EGFRCR SERPLBLD CKD-EPI 2021: 86 ML/MIN/1.73M2 (ref 60–?)
EOSINOPHIL # BLD AUTO: 0.25 X10(3) UL (ref 0–0.7)
EOSINOPHIL NFR BLD AUTO: 5.1 %
ERYTHROCYTE [DISTWIDTH] IN BLOOD BY AUTOMATED COUNT: 12.5 %
FASTING PATIENT LIPID ANSWER: YES
FASTING STATUS PATIENT QL REPORTED: YES
GLOBULIN PLAS-MCNC: 3.9 G/DL (ref 2.8–4.4)
GLUCOSE BLD-MCNC: 88 MG/DL (ref 70–99)
HCT VFR BLD AUTO: 38.7 %
HDLC SERPL-MCNC: 61 MG/DL (ref 40–59)
HGB BLD-MCNC: 13.1 G/DL
IMM GRANULOCYTES # BLD AUTO: 0.01 X10(3) UL (ref 0–1)
IMM GRANULOCYTES NFR BLD: 0.2 %
LDLC SERPL CALC-MCNC: 73 MG/DL (ref ?–100)
LYMPHOCYTES # BLD AUTO: 1.88 X10(3) UL (ref 1–4)
LYMPHOCYTES NFR BLD AUTO: 38.4 %
MCH RBC QN AUTO: 29.2 PG (ref 26–34)
MCHC RBC AUTO-ENTMCNC: 33.9 G/DL (ref 31–37)
MCV RBC AUTO: 86.4 FL
MONOCYTES # BLD AUTO: 0.43 X10(3) UL (ref 0.1–1)
MONOCYTES NFR BLD AUTO: 8.8 %
NEUTROPHILS # BLD AUTO: 2.29 X10 (3) UL (ref 1.5–7.7)
NEUTROPHILS # BLD AUTO: 2.29 X10(3) UL (ref 1.5–7.7)
NEUTROPHILS NFR BLD AUTO: 46.9 %
NONHDLC SERPL-MCNC: 87 MG/DL (ref ?–130)
OSMOLALITY SERPL CALC.SUM OF ELEC: 290 MOSM/KG (ref 275–295)
PLATELET # BLD AUTO: 219 10(3)UL (ref 150–450)
POTASSIUM SERPL-SCNC: 3.8 MMOL/L (ref 3.5–5.1)
PROT SERPL-MCNC: 8 G/DL (ref 6.4–8.2)
RBC # BLD AUTO: 4.48 X10(6)UL
SODIUM SERPL-SCNC: 140 MMOL/L (ref 136–145)
TRIGL SERPL-MCNC: 72 MG/DL (ref 30–149)
TSI SER-ACNC: 1.01 MIU/ML (ref 0.36–3.74)
VLDLC SERPL CALC-MCNC: 11 MG/DL (ref 0–30)
WBC # BLD AUTO: 4.9 X10(3) UL (ref 4–11)

## 2024-03-21 PROCEDURE — 36415 COLL VENOUS BLD VENIPUNCTURE: CPT

## 2024-03-21 PROCEDURE — 85025 COMPLETE CBC W/AUTO DIFF WBC: CPT

## 2024-03-21 PROCEDURE — 84443 ASSAY THYROID STIM HORMONE: CPT

## 2024-03-21 PROCEDURE — 80061 LIPID PANEL: CPT

## 2024-03-21 PROCEDURE — 80053 COMPREHEN METABOLIC PANEL: CPT

## 2024-03-22 ENCOUNTER — OFFICE VISIT (OUTPATIENT)
Dept: INTERNAL MEDICINE CLINIC | Facility: CLINIC | Age: 39
End: 2024-03-22
Payer: COMMERCIAL

## 2024-03-22 VITALS
HEART RATE: 80 BPM | OXYGEN SATURATION: 98 % | SYSTOLIC BLOOD PRESSURE: 120 MMHG | HEIGHT: 63 IN | WEIGHT: 131 LBS | TEMPERATURE: 98 F | DIASTOLIC BLOOD PRESSURE: 68 MMHG | RESPIRATION RATE: 16 BRPM | BODY MASS INDEX: 23.21 KG/M2

## 2024-03-22 DIAGNOSIS — M54.50 ACUTE LEFT-SIDED LOW BACK PAIN WITHOUT SCIATICA: Primary | ICD-10-CM

## 2024-03-22 DIAGNOSIS — B37.31 VULVOVAGINAL CANDIDIASIS: ICD-10-CM

## 2024-03-22 PROCEDURE — 3078F DIAST BP <80 MM HG: CPT | Performed by: PHYSICIAN ASSISTANT

## 2024-03-22 PROCEDURE — 3074F SYST BP LT 130 MM HG: CPT | Performed by: PHYSICIAN ASSISTANT

## 2024-03-22 PROCEDURE — 3008F BODY MASS INDEX DOCD: CPT | Performed by: PHYSICIAN ASSISTANT

## 2024-03-22 PROCEDURE — 99214 OFFICE O/P EST MOD 30 MIN: CPT | Performed by: PHYSICIAN ASSISTANT

## 2024-03-22 RX ORDER — FLUCONAZOLE 150 MG/1
150 TABLET ORAL ONCE
Qty: 2 TABLET | Refills: 0 | Status: SHIPPED | OUTPATIENT
Start: 2024-03-22 | End: 2024-03-22

## 2024-03-22 RX ORDER — PREDNISONE 20 MG/1
20 TABLET ORAL DAILY
Qty: 7 TABLET | Refills: 0 | Status: SHIPPED | OUTPATIENT
Start: 2024-03-22 | End: 2024-03-29

## 2024-03-22 NOTE — PROGRESS NOTES
Serenity Spears is a 38 year old female.  HPI:    c/o back pain x 1.5 weeks after playing basketball and running into her son;   Pain is dull, aching, abdominal muscles feel sore too. Non-radiating. Tylenol, ibuprofen not helping     Also c/o vaginal itching, on period; used monistat 1 day  Still has some itching. Currently on her period.    Current Outpatient Medications   Medication Sig Dispense Refill   • predniSONE 20 MG Oral Tab Take 1 tablet (20 mg total) by mouth daily for 7 days. 7 tablet 0   • fluconazole 150 MG Oral Tab Take 1 tablet (150 mg total) by mouth once for 1 dose. Repeat in 7 days if needed. 2 tablet 0   • Vitamin D, Ergocalciferol, 90453 units Oral Cap Take 1 capsule by mouth once a week. 12 capsule 0   • FIBER OR Take by mouth.     • dexamethasone 0.5 MG/5ML Oral Solution Take 5 mL (0.5 mg total) by mouth 3 (three) times daily. 240 mL 0   • ALPRAZolam (XANAX) 0.5 MG Oral Tab Take 1 tablet (0.5 mg total) by mouth 2 (two) times daily as needed for Anxiety. 28 tablet 0   • albuterol 108 (90 Base) MCG/ACT Inhalation Aero Soln Inhale 2 puffs into the lungs every 6 (six) hours as needed for Wheezing. 1 each 0   • Multiple Vitamin (MULTIVITAMIN ADULT OR) Take by mouth daily.        Past Medical History:   Diagnosis Date   • Allergic rhinitis - cat,ragweed,weeds,dust mites 09/04/2013   • Anemia     during pregnancy   • Anxiety     no meds   • Asthma (HCC)    • B12 deficiency 07/06/2015   • Cyst of ovary, left 06/16/2017   • Depression     18-22yo, not treated with meds, depression & anxiety   • Easy bruising 06/19/2017   • Exercise-induced asthma (HCC)    • TMJ (temporomandibular joint syndrome)    • Vitamin D deficiency 09/15/2015      Social History:  Social History     Socioeconomic History   • Marital status:    • Number of children: 2   Occupational History   • Occupation:    Tobacco Use   • Smoking status: Never   • Smokeless tobacco: Never   Vaping Use   • Vaping Use: Never used    Substance and Sexual Activity   • Alcohol use: Yes     Comment: social    • Drug use: No   • Sexual activity: Yes     Partners: Male     Birth control/protection: Tubal Ligation   Other Topics Concern   • Caffeine Concern Yes     Comment: 2 cups a day   • Exercise No     Comment: 5x a week   • Seat Belt Yes        REVIEW OF SYSTEMS:   GENERAL HEALTH: feels well otherwise. Denies fever, chills, unintentional weight change  SKIN: denies any unusual skin lesions or rashes  RESPIRATORY: denies shortness of breath with exertion, denies cough or wheezing  CARDIOVASCULAR: denies chest pain or palpitations, denies leg swelling  GI: denies abdominal pain and denies heartburn. Denies nausea, vomiting, diarrhea, constipation  NEURO: denies headaches, dizziness, weakness, syncope    EXAM:   /68   Pulse 80   Temp 97.8 °F (36.6 °C)   Resp 16   Ht 5' 3\" (1.6 m)   Wt 131 lb (59.4 kg)   LMP 03/20/2024 (Exact Date)   SpO2 98%   BMI 23.21 kg/m²   GENERAL: well developed, well nourished,in no apparent distress  SKIN: no rashes,no suspicious lesions, warm and dry  HEENT: atraumatic, normocephalic,ears and throat are clear  NECK: supple,no adenopathy, no thyromegaly  LUNGS: clear to auscultation b/l no W/R/R  CARDIO: RRR without murmur  GI: good BS's,no masses, HSM, distension or tenderness  EXTREMITIES: no cyanosis, clubbing or edema  MUSCULOSKELETAL: FROM, no joint swelling or bony tenderness, no deformity. Neg SLR. DTR's +2 and symmetric  NEURO: a/ox3, no focal deficits  PSYCH: mood and affect normal    ASSESSMENT AND PLAN:   1. Acute left-sided low back pain without sciatica (Primary)  Pt has leftover cyclobenzaprine rx, will take 1/2 to 1 tablet at bedtime prn   Start prednisone if not improving in 2-3 d  -     predniSONE; Take 1 tablet (20 mg total) by mouth daily for 7 days.  Dispense: 7 tablet; Refill: 0  2. Vulvovaginal candidiasis  -     Fluconazole; Take 1 tablet (150 mg total) by mouth once for 1 dose. Repeat  in 7 days if needed.  Dispense: 2 tablet; Refill: 0        The patient indicates understanding of these issues and agrees to the plan.  No follow-ups on file.

## 2024-04-04 ENCOUNTER — OFFICE VISIT (OUTPATIENT)
Dept: OBGYN CLINIC | Facility: CLINIC | Age: 39
End: 2024-04-04

## 2024-04-04 VITALS
WEIGHT: 131.19 LBS | HEIGHT: 63 IN | SYSTOLIC BLOOD PRESSURE: 120 MMHG | BODY MASS INDEX: 23.25 KG/M2 | DIASTOLIC BLOOD PRESSURE: 84 MMHG

## 2024-04-04 DIAGNOSIS — Z01.419 ENCOUNTER FOR WELL WOMAN EXAM WITH ROUTINE GYNECOLOGICAL EXAM: Primary | ICD-10-CM

## 2024-04-04 DIAGNOSIS — Z12.4 ENCOUNTER FOR PAPANICOLAOU SMEAR FOR CERVICAL CANCER SCREENING: ICD-10-CM

## 2024-04-04 DIAGNOSIS — N90.7 LABIAL CYST: ICD-10-CM

## 2024-04-04 PROCEDURE — 3074F SYST BP LT 130 MM HG: CPT | Performed by: OBSTETRICS & GYNECOLOGY

## 2024-04-04 PROCEDURE — 3079F DIAST BP 80-89 MM HG: CPT | Performed by: OBSTETRICS & GYNECOLOGY

## 2024-04-04 PROCEDURE — 99395 PREV VISIT EST AGE 18-39: CPT | Performed by: OBSTETRICS & GYNECOLOGY

## 2024-04-04 PROCEDURE — 3008F BODY MASS INDEX DOCD: CPT | Performed by: OBSTETRICS & GYNECOLOGY

## 2024-04-04 PROCEDURE — 99212 OFFICE O/P EST SF 10 MIN: CPT | Performed by: OBSTETRICS & GYNECOLOGY

## 2024-04-04 NOTE — PROGRESS NOTES
[facility]  Obstetrics and Gynecology  Gynecology Visit    Chief Complaint   Patient presents with    Annual           Serenity MAGALY Spears is a 38 year old female who presents for annual exam.    LMP: 03/20/2024.    Menses regular: yes.    Menstrual flow normal: yes.    Birth control or HRT:  0.   Refill 0 Has Tubal Ligation  Last Pap Smear: 03/13/2023.  Any history of abnormal paps: No hx abn   Last MMG: n/a  Any Medication Refills needed today?: no  Sleep: 7-8 hours.    Diet: balanced.    Exercise: 3 x weekly. Cardio and Strength  Screening labs/Blood work today: no.     Colonoscopy (if over 46 y/o): 2023 one polyp removed-benign and follow up in 5-7 years per recommendation of GI  Gardasil:(age 9-46 y/o) unsure  Genetic Cancer screen (if indicated): no.   Flu (Aug-April): n/a.TDAP (every 10 years) up to date.      No Family hx of gynecological CA    Additional Problems/concerns: labia cyst on right, very small. Not causing any problems    Next Appt: annual schedule    Immunization History   Administered Date(s) Administered    Covid-19 Vaccine Pfizer 30 mcg/0.3 ml 04/12/2021, 05/04/2021, 12/30/2021    FLULAVAL 6 months & older 0.5 ml Prefilled syringe (55349) 02/07/2018, 09/24/2019    FLUZONE 6 months and older PFS 0.5 ml (40609) 02/07/2018, 10/03/2018, 09/24/2019, 10/01/2020    Fluarix 6 Months And Older 0.5 ml prefilled syringe (80507) 10/01/2020    Flublok Quad Influenza Vaccine (48628) 10/25/2021    Influenza 11/11/2014    Pneumococcal (Prevnar 13) 05/23/2018    Pneumovax 23 03/18/2021    TDAP 06/28/2016    Tb Intradermal Test 03/28/2008         Current Outpatient Medications:     Vitamin D, Ergocalciferol, 76349 units Oral Cap, Take 1 capsule by mouth once a week., Disp: 12 capsule, Rfl: 0    FIBER OR, Take by mouth., Disp: , Rfl:     dexamethasone 0.5 MG/5ML Oral Solution, Take 5 mL (0.5 mg total) by mouth 3 (three) times daily., Disp: 240 mL, Rfl: 0    ALPRAZolam (XANAX) 0.5 MG Oral Tab, Take 1 tablet  (0.5 mg total) by mouth 2 (two) times daily as needed for Anxiety., Disp: 28 tablet, Rfl: 0    albuterol 108 (90 Base) MCG/ACT Inhalation Aero Soln, Inhale 2 puffs into the lungs every 6 (six) hours as needed for Wheezing., Disp: 1 each, Rfl: 0    Multiple Vitamin (MULTIVITAMIN ADULT OR), Take by mouth daily., Disp: , Rfl:     Allergies   Allergen Reactions    Azithromycin DIARRHEA     Stomach upset    Bactrim [Sulfamethoxazole W/Trimethoprim, Co-Trimoxazole] DIARRHEA     Stomach upset       OB History    Para Term  AB Living   2 2 2 0 0 2   SAB IAB Ectopic Multiple Live Births   0 0 0 0 2      # Outcome Date GA Lbr Juan/2nd Weight Sex Delivery Anes PTL Lv   2 Term 16 38w5d  6 lb 9.8 oz (3 kg) M CS-LTranv Spinal N MIGUE      Apgar1: 7  Apgar5: 8   1 Term 01/31/15 37w6d 16:32 / 01:52 7 lb 0.9 oz (3.2 kg) M CS-LTranv EPI N MIGUE      Complications: Variable decelerations, Fetal tachycardia      Apgar1: 8  Apgar5: 9       Past Medical History:   Diagnosis Date    Allergic rhinitis - cat,ragweed,weeds,dust mites 2013    Anemia     during pregnancy    Anxiety     no meds    Asthma (HCC)     B12 deficiency 2015    Cyst of ovary, left 2017    Depression     18-22yo, not treated with meds, depression & anxiety    Easy bruising 2017    Exercise-induced asthma (HCC)     TMJ (temporomandibular joint syndrome)     Vitamin D deficiency 09/15/2015       Past Surgical History:   Procedure Laterality Date      2015, 2016    COLONOSCOPY N/A 3/16/2018    Procedure: COLONOSCOPY;  Surgeon: Jayjay Lozoya MD;  Location: [facility] ENDOSCOPY    COLONOSCOPY N/A 2023    Procedure: COLONOSCOPY;  Surgeon: Jayjay Lozoya MD;  Location: [facility] ENDOSCOPY    SPECIAL SERVICE OR REPORT      warts removed on rt foot    TUBAL LIGATION         Family History   Problem Relation Age of Onset    Heart Disorder Paternal Grandfather     High Cholesterol Paternal Grandfather     Other (Other)  Maternal Grandmother         osteoarthritis     Hypertension Maternal Grandfather     Cancer Maternal Grandfather         lung    High Cholesterol Paternal Grandmother     Heart Disorder Father     Cancer Maternal Aunt 50        colon ca               Social History     Socioeconomic History    Marital status:      Spouse name: Not on file    Number of children: 2    Years of education: Not on file    Highest education level: Not on file   Occupational History    Occupation:    Tobacco Use    Smoking status: Never     Passive exposure: Never    Smokeless tobacco: Never   Vaping Use    Vaping Use: Never used   Substance and Sexual Activity    Alcohol use: Yes     Comment: social     Drug use: No    Sexual activity: Yes     Partners: Male     Birth control/protection: Tubal Ligation   Other Topics Concern     Service Not Asked    Blood Transfusions Not Asked    Caffeine Concern Yes     Comment: 2 cups a day    Occupational Exposure Not Asked    Hobby Hazards Not Asked    Sleep Concern Not Asked    Stress Concern Not Asked    Weight Concern Not Asked    Special Diet Not Asked    Back Care Not Asked    Exercise No     Comment: 5x a week    Bike Helmet Not Asked    Seat Belt Yes    Self-Exams Not Asked   Social History Narrative    Not on file     Social Determinants of Health     Financial Resource Strain: Not on file   Food Insecurity: Not on file   Transportation Needs: Not on file   Physical Activity: Not on file   Stress: Not on file   Social Connections: Not on file   Housing Stability: Not on file       /84   Ht 5' 3\" (1.6 m)   Wt 131 lb 2.8 oz (59.5 kg)   LMP 03/20/2024 (Exact Date)   BMI 23.24 kg/m²     Wt Readings from Last 3 Encounters:   04/04/24 131 lb 2.8 oz (59.5 kg)   03/22/24 131 lb (59.4 kg)   02/26/24 129 lb (58.5 kg)         Health Maintenance   Topic Date Due    Influenza Vaccine (1) 08/01/2021    Screen for Cervical Cancer 11/05/2021    DTaP,Tdap and Td Vaccines (3  - Td or Tdap) 03/18/2025    Hepatitis C Screening Completed    HIV Screening Completed    COVID-19 Vaccine Completed     Review of Systems   General: Present- Feeling well. Not Present- Chills, Fever, Weight Gain and Weight Loss.  HEENT: Not Present- Headache and Sore Throat.  Respiratory: Not Present- Cough, Difficulty Breathing, Hemoptysis and Sputum Production.  Cardiovascular: Not Present- Chest Pain, Elevated Blood Pressure, Fainting / Blacking Out and Shortness of Breath.  Gastrointestinal: Not Present- Constipation, Diarrhea, Nausea and Vomiting.  Female Genitourinary: Not Present- Discharge, Dysuria and Frequency.  Musculoskeletal: Not Present- Leg Cramps and Swelling of Extremities.  Neurological: Not Present- Dizziness and Headaches.  Psychiatric: Not Present- Anxiety and Depression.  Endocrine: Not Present- Appetite Changes, Hair Changes and Thyroid Problems.  Hematology: Not Present- Easy Bruising and Excessive bleeding.  All other systems negative     Physical Exam The physical exam findings are as follows:     General   Mental Status - Alert. General Appearance - Cooperative. Orientation - Oriented X4. Build & Nutrition - Well nourished.    Head and Neck  Thyroid   Gland Characteristics - normal size and consistency.    Chest and Lung Exam   Inspection:   Chest Wall: - Normal.  Percussion:   Quality and Intensity: - Percussion normal.  Palpation: - Palpation normal.  Auscultation:   Breath sounds: - Normal.  Adventitious sounds: - No Adventitious sounds.    Breast   Nipples: Characteristics - Bilateral - Normal. Discharge - Bilateral - None.  Breast - Bilateral - Symmetric.    Cardiovascular   Auscultation: Rhythm - Regular. Heart Sounds - Normal heart sounds.  Murmurs & Other Heart Sounds: Auscultation of the heart reveals - No Murmurs.    Abdomen   Inspection: Inspection of the abdomen reveals - No Hernias. Incisional scars - c/s  incisional scars.  Palpation/Percussion: Palpation and Percussion of  the abdomen reveal - Non Tender and No Palpable abdominal masses.  Liver: - Normal.  Auscultation: Auscultation of the abdomen reveals - Bowel sounds normal.    Female Genitourinary     External Genitalia   Perineum - Normal. Bartholin's Gland - Bilateral - Normal. Clitoris - Normal.  Introitus: Characteristics - No Cystocele, Enterocele or Rectocele. Discharge - None.  Labia Majora: Lesions - Left - None. Right- Small cyst- non-tender, no Redness, no discharge. Characteristics - Bilateral - Normal.  Labia Minora: Lesions - Bilateral - None. Characteristics - Bilateral - Normal.  Urethra: Characteristics - Normal. Discharge - None.  [place] Gland - Bilateral - Normal.  Vulva: Characteristics - Normal. Lesions - None.    Speculum & Bimanual   Vagina:   Vaginal Wall: - Normal.  Vaginal Lesions - None. Vaginal Mucosa - Normal.  Cervix: Characteristics - No Motion tenderness. Discharge - None.  Uterus: Characteristics - Normal. Position - Midposition.  Adnexa: Characteristics - Bilateral - Normal. Masses - No Adnexal Masses.      Rectal   Anorectal Exam: External - normal external exam.    Peripheral Vascular   Upper Extremity:   Palpation: - Pulses bilaterally normal.  Lower Extremity: Inspection - Bilateral - Inspection Normal.  Palpation: Edema - Bilateral - No edema.    Neurologic   Mental Status: - Normal.    Lymphatic  General Lymphatics   Description - Normal .       1. Encounter for well woman exam with routine gynecological exam    2. Routine Papanicolaou smear    3. Labial cyst     Pap collected. Reviewed screening recommendations    HPV vaccination: discussed benefits. Patient unsure if she received vaccine series. Will find out and have completed if needed    Mons pubis cyst: recommended application of heat. Reviewed signs of infection    Reviewed physical activity recommendations and healthy diet    RTC 1 year and PRN for gynecological concerns    Assessment and Plan completed by Melyssa OKEEFE under the  direct supervision of Dr. Zavala      Patient seen and examined, Agree with assessment and plan of care documented by NP student.     Jeri Zavala MD

## 2024-04-05 ENCOUNTER — LAB ENCOUNTER (OUTPATIENT)
Dept: LAB | Age: 39
End: 2024-04-05
Attending: INTERNAL MEDICINE
Payer: COMMERCIAL

## 2024-04-05 DIAGNOSIS — Z00.00 LABORATORY EXAMINATION ORDERED AS PART OF A ROUTINE GENERAL MEDICAL EXAMINATION: ICD-10-CM

## 2024-04-05 LAB
BILIRUB UR QL STRIP.AUTO: NEGATIVE
CLARITY UR REFRACT.AUTO: CLEAR
GLUCOSE UR STRIP.AUTO-MCNC: NORMAL MG/DL
KETONES UR STRIP.AUTO-MCNC: 10 MG/DL
LEUKOCYTE ESTERASE UR QL STRIP.AUTO: NEGATIVE
NITRITE UR QL STRIP.AUTO: NEGATIVE
PH UR STRIP.AUTO: 6 [PH] (ref 5–8)
PROT UR STRIP.AUTO-MCNC: NEGATIVE MG/DL
RBC UR QL AUTO: NEGATIVE
SP GR UR STRIP.AUTO: 1.02 (ref 1–1.03)
UROBILINOGEN UR STRIP.AUTO-MCNC: NORMAL MG/DL

## 2024-04-05 PROCEDURE — 81003 URINALYSIS AUTO W/O SCOPE: CPT

## 2024-04-08 ENCOUNTER — OFFICE VISIT (OUTPATIENT)
Dept: INTERNAL MEDICINE CLINIC | Facility: CLINIC | Age: 39
End: 2024-04-08
Payer: COMMERCIAL

## 2024-04-08 VITALS
OXYGEN SATURATION: 98 % | DIASTOLIC BLOOD PRESSURE: 60 MMHG | HEIGHT: 64 IN | BODY MASS INDEX: 22.2 KG/M2 | SYSTOLIC BLOOD PRESSURE: 110 MMHG | RESPIRATION RATE: 14 BRPM | WEIGHT: 130 LBS | TEMPERATURE: 98 F | HEART RATE: 78 BPM

## 2024-04-08 DIAGNOSIS — Z00.00 ANNUAL PHYSICAL EXAM: Primary | ICD-10-CM

## 2024-04-08 PROCEDURE — 3078F DIAST BP <80 MM HG: CPT | Performed by: INTERNAL MEDICINE

## 2024-04-08 PROCEDURE — 99395 PREV VISIT EST AGE 18-39: CPT | Performed by: INTERNAL MEDICINE

## 2024-04-08 PROCEDURE — 3008F BODY MASS INDEX DOCD: CPT | Performed by: INTERNAL MEDICINE

## 2024-04-08 PROCEDURE — 3074F SYST BP LT 130 MM HG: CPT | Performed by: INTERNAL MEDICINE

## 2024-04-08 NOTE — PROGRESS NOTES
Subjective:   Patient ID: Serenity Spears is a 38 year old female.    HPI  HPI:   Serenity Spears is a 38 year old female who presents for a complete physical exam. Symptoms: denies discharge, itching, burning or dysuria, periods are regular. Patient complains of nothing    ACT=23    PAST MEDICAL, SOCIAL, FAMILY HISTORIES REVIEWED WITH PT  .     Immunization History   Administered Date(s) Administered    Covid-19 Vaccine Pfizer 30 mcg/0.3 ml 04/12/2021, 05/04/2021, 12/30/2021    FLULAVAL 6 months & older 0.5 ml Prefilled syringe (83444) 02/07/2018, 09/24/2019    FLUZONE 6 months and older PFS 0.5 ml (54825) 02/07/2018, 10/03/2018, 09/24/2019, 10/01/2020    Fluarix 6 Months And Older 0.5 ml prefilled syringe (97197) 10/01/2020    Flublok Quad Influenza Vaccine (22990) 10/25/2021    Influenza 11/11/2014    Pneumococcal (Prevnar 13) 05/23/2018    Pneumovax 23 03/18/2021    TDAP 06/28/2016    Tb Intradermal Test 03/28/2008   Deferred Date(s) Deferred    FLUZONE 6 months and older PFS 0.5 ml (85234) 01/26/2017     Wt Readings from Last 6 Encounters:   04/08/24 130 lb (59 kg)   04/04/24 131 lb 2.8 oz (59.5 kg)   03/22/24 131 lb (59.4 kg)   02/26/24 129 lb (58.5 kg)   11/02/23 128 lb (58.1 kg)   10/27/23 127 lb (57.6 kg)     Body mass index is 22.31 kg/m².     Lab Results   Component Value Date    GLU 88 03/21/2024    GLU 93 03/31/2023    GLU 91 03/22/2022    GLUCOSE 78 09/03/2013     Lab Results   Component Value Date    CHOLEST 148 03/21/2024    CHOLEST 150 03/31/2023    CHOLEST 150 03/22/2022     Lab Results   Component Value Date    HDL 61 (H) 03/21/2024    HDL 67 (H) 03/31/2023    HDL 64 (H) 03/22/2022     Lab Results   Component Value Date    LDL 73 03/21/2024    LDL 72 03/31/2023    LDL 69 03/22/2022     Lab Results   Component Value Date    AST 17 03/21/2024    AST 14 (L) 03/31/2023    AST 10 (L) 03/22/2022     Lab Results   Component Value Date    ALT 18 03/21/2024    ALT 25 03/31/2023    ALT 18 03/22/2022        Current Outpatient Medications   Medication Sig Dispense Refill    Vitamin D, Ergocalciferol, 65661 units Oral Cap Take 1 capsule by mouth once a week. 12 capsule 0    FIBER OR Take by mouth.      dexamethasone 0.5 MG/5ML Oral Solution Take 5 mL (0.5 mg total) by mouth 3 (three) times daily. 240 mL 0    ALPRAZolam (XANAX) 0.5 MG Oral Tab Take 1 tablet (0.5 mg total) by mouth 2 (two) times daily as needed for Anxiety. 28 tablet 0    albuterol 108 (90 Base) MCG/ACT Inhalation Aero Soln Inhale 2 puffs into the lungs every 6 (six) hours as needed for Wheezing. 1 each 0    Multiple Vitamin (MULTIVITAMIN ADULT OR) Take by mouth daily.        Past Medical History:   Diagnosis Date    Allergic rhinitis - cat,ragweed,weeds,dust mites 2013    Anemia     during pregnancy    Anxiety     no meds    Asthma (HCC)     B12 deficiency 2015    Cyst of ovary, left 2017    Depression     18-22yo, not treated with meds, depression & anxiety    Easy bruising 2017    Exercise-induced asthma (HCC)     TMJ (temporomandibular joint syndrome)     Vitamin D deficiency 09/15/2015      Past Surgical History:   Procedure Laterality Date      2015, 2016    COLONOSCOPY N/A 3/16/2018    Procedure: COLONOSCOPY;  Surgeon: Jayjay Lozoya MD;  Location: [facility] ENDOSCOPY    COLONOSCOPY N/A 2023    Procedure: COLONOSCOPY;  Surgeon: Jayjay Lozoya MD;  Location: [facility] ENDOSCOPY    SPECIAL SERVICE OR REPORT      warts removed on rt foot    TUBAL LIGATION        Family History   Problem Relation Age of Onset    Heart Disorder Paternal Grandfather     High Cholesterol Paternal Grandfather     Other (Other) Maternal Grandmother         osteoarthritis     Hypertension Maternal Grandfather     Cancer Maternal Grandfather         lung    High Cholesterol Paternal Grandmother     Heart Disorder Father     Cancer Maternal Aunt 50        colon ca      Social History:   Social History     Socioeconomic History     Marital status:     Number of children: 2   Occupational History    Occupation:    Tobacco Use    Smoking status: Never     Passive exposure: Never    Smokeless tobacco: Never   Vaping Use    Vaping Use: Never used   Substance and Sexual Activity    Alcohol use: Yes     Comment: social     Drug use: No    Sexual activity: Yes     Partners: Male     Birth control/protection: Tubal Ligation   Other Topics Concern    Caffeine Concern Yes     Comment: 2 cups a day    Exercise No     Comment: 5x a week    Seat Belt Yes     Occ: yes. : yes. Children: yes.   Exercise:  twice per week, three times per week.  Diet: watches fats closely and watches sugar closely     REVIEW OF SYSTEMS:   A comprehensive 10 point review of systems was completed.     Pertinent positives and negatives noted in the HPI.      EXAM:   /60   Pulse 78   Temp 97.8 °F (36.6 °C)   Resp 14   Ht 5' 4\" (1.626 m)   Wt 130 lb (59 kg)   LMP 03/20/2024 (Exact Date)   SpO2 98%   BMI 22.31 kg/m²   Body mass index is 22.31 kg/m².   GENERAL: well developed, well nourished,in no apparent distress  SKIN: no rashes,no suspicious lesions  HEENT: atraumatic, normocephalic,ears and throat are clear  EYES:PERRLA, EOMI, conjunctiva are clear  NECK: supple,no adenopathy,no bruits  LUNGS: clear to auscultation  CARDIO: RRR without murmur  GI: good BS's,no masses, HSM or tenderness  :deferred  MUSCULOSKELETAL: back is not tender  EXTREMITIES: no cyanosis, clubbing or edema  NEURO: Oriented times three,,motor and sensory are grossly intact    ASSESSMENT AND PLAN:   Serenity Spears is a 38 year old female who presents for a complete physical exam.  Pap and pelvic done by gyne  .Order put in for mammogram and dexascan.  Health maintenance, will check fasting Lipids, CMP, and CBC. Pt referred for screening colonoscopy. Pt info handouts given for: exercise, low fat diet as Body mass index is 22.31 kg/m²., recommended low fat diet and aerobic  exercise 30 minutes three times weekly.    The patient indicates understanding of these issues and agrees to the plan.  The patient is asked to return for CPX in 12 m.    History/Other:   Review of Systems  Current Outpatient Medications   Medication Sig Dispense Refill    Vitamin D, Ergocalciferol, 40017 units Oral Cap Take 1 capsule by mouth once a week. 12 capsule 0    FIBER OR Take by mouth.      dexamethasone 0.5 MG/5ML Oral Solution Take 5 mL (0.5 mg total) by mouth 3 (three) times daily. 240 mL 0    ALPRAZolam (XANAX) 0.5 MG Oral Tab Take 1 tablet (0.5 mg total) by mouth 2 (two) times daily as needed for Anxiety. 28 tablet 0    albuterol 108 (90 Base) MCG/ACT Inhalation Aero Soln Inhale 2 puffs into the lungs every 6 (six) hours as needed for Wheezing. 1 each 0    Multiple Vitamin (MULTIVITAMIN ADULT OR) Take by mouth daily.       Allergies:  Allergies   Allergen Reactions    Azithromycin DIARRHEA     Stomach upset    Bactrim [Sulfamethoxazole W/Trimethoprim, Co-Trimoxazole] DIARRHEA     Stomach upset       Objective:   Physical Exam    Assessment & Plan:   1. Annual physical exam        No orders of the defined types were placed in this encounter.      Meds This Visit:  Requested Prescriptions      No prescriptions requested or ordered in this encounter       Imaging & Referrals:  None

## 2024-04-10 LAB
.: NORMAL
.: NORMAL
HPV I/H RISK 1 DNA SPEC QL NAA+PROBE: NEGATIVE

## 2024-06-05 ENCOUNTER — LAB ENCOUNTER (OUTPATIENT)
Dept: LAB | Age: 39
End: 2024-06-05
Attending: INTERNAL MEDICINE
Payer: COMMERCIAL

## 2024-06-05 DIAGNOSIS — E55.9 VITAMIN D DEFICIENCY: ICD-10-CM

## 2024-06-05 LAB — VIT D+METAB SERPL-MCNC: 68.5 NG/ML (ref 30–100)

## 2024-06-05 PROCEDURE — 82306 VITAMIN D 25 HYDROXY: CPT

## 2024-06-05 PROCEDURE — 36415 COLL VENOUS BLD VENIPUNCTURE: CPT

## 2024-10-18 ENCOUNTER — OFFICE VISIT (OUTPATIENT)
Dept: INTERNAL MEDICINE CLINIC | Facility: CLINIC | Age: 39
End: 2024-10-18
Payer: COMMERCIAL

## 2024-10-18 VITALS
OXYGEN SATURATION: 98 % | WEIGHT: 128 LBS | DIASTOLIC BLOOD PRESSURE: 70 MMHG | RESPIRATION RATE: 14 BRPM | TEMPERATURE: 98 F | SYSTOLIC BLOOD PRESSURE: 122 MMHG | BODY MASS INDEX: 21.85 KG/M2 | HEART RATE: 82 BPM | HEIGHT: 64 IN

## 2024-10-18 DIAGNOSIS — M54.12 CERVICAL RADICULITIS: Primary | ICD-10-CM

## 2024-10-18 PROCEDURE — 3074F SYST BP LT 130 MM HG: CPT | Performed by: INTERNAL MEDICINE

## 2024-10-18 PROCEDURE — 3078F DIAST BP <80 MM HG: CPT | Performed by: INTERNAL MEDICINE

## 2024-10-18 PROCEDURE — 3008F BODY MASS INDEX DOCD: CPT | Performed by: INTERNAL MEDICINE

## 2024-10-18 PROCEDURE — 99214 OFFICE O/P EST MOD 30 MIN: CPT | Performed by: INTERNAL MEDICINE

## 2024-10-18 RX ORDER — METHYLPREDNISOLONE 4 MG/1
TABLET ORAL
Qty: 1 EACH | Refills: 0 | Status: SHIPPED | OUTPATIENT
Start: 2024-10-18

## 2024-10-18 NOTE — PROGRESS NOTES
Subjective:   Patient ID: Serenity Spears is a 39 year old female.    Shoulder Pain   The pain is present in the right fingers, right hand and neck. This is a new problem. Episode onset: > 6 months. There has been no history of extremity trauma. The problem occurs 2 to 4 times per day. The problem has been gradually worsening. The quality of the pain is described as burning. The pain is moderate. Associated symptoms include numbness (right hand). Pertinent negatives include no fever, inability to bear weight, itching, joint locking, joint swelling, limited range of motion, stiffness or tingling. The symptoms are aggravated by activity. She has tried rest and NSAIDS for the symptoms. The treatment provided no relief.     Pt has had recent MRI cervical at outside facility. No results for review in care everywhere  History/Other:   Review of Systems   Constitutional:  Negative for fever.   Musculoskeletal:  Negative for stiffness.   Skin:  Negative for itching.   Neurological:  Positive for numbness (right hand). Negative for tingling.   All other systems reviewed and are negative.    Current Outpatient Medications   Medication Sig Dispense Refill    methylPREDNISolone (MEDROL) 4 MG Oral Tablet Therapy Pack As directed. 1 each 0    FIBER OR Take by mouth.      dexamethasone 0.5 MG/5ML Oral Solution Take 5 mL (0.5 mg total) by mouth 3 (three) times daily. 240 mL 0    ALPRAZolam (XANAX) 0.5 MG Oral Tab Take 1 tablet (0.5 mg total) by mouth 2 (two) times daily as needed for Anxiety. 28 tablet 0    albuterol 108 (90 Base) MCG/ACT Inhalation Aero Soln Inhale 2 puffs into the lungs every 6 (six) hours as needed for Wheezing. 1 each 0    Multiple Vitamin (MULTIVITAMIN ADULT OR) Take by mouth daily.       Allergies:Allergies[1]    Objective:   Physical Exam  Vitals and nursing note reviewed.   Constitutional:       Appearance: Normal appearance.   HENT:      Head: Normocephalic and atraumatic.      Right Ear: Tympanic membrane  normal.      Left Ear: Tympanic membrane normal.   Cardiovascular:      Rate and Rhythm: Normal rate and regular rhythm.      Pulses: Normal pulses.      Heart sounds: Normal heart sounds.   Pulmonary:      Effort: Pulmonary effort is normal.      Breath sounds: Normal breath sounds.   Abdominal:      General: Abdomen is flat. Bowel sounds are normal.      Palpations: Abdomen is soft.   Musculoskeletal:      Cervical back: Normal range of motion and neck supple. No rigidity or tenderness.   Skin:     Findings: No rash.   Neurological:      General: No focal deficit present.      Mental Status: She is alert.      Deep Tendon Reflexes:      Reflex Scores:       Tricep reflexes are 2+ on the right side.       Bicep reflexes are 2+ on the right side.        Assessment & Plan:   1. Cervical radiculitis      Trial of medrol dose pack  Refer to Dr Arevalo (ortho spine) for eval and review of MRI that pt will bring to the appt  No orders of the defined types were placed in this encounter.      Meds This Visit:  Requested Prescriptions     Signed Prescriptions Disp Refills    methylPREDNISolone (MEDROL) 4 MG Oral Tablet Therapy Pack 1 each 0     Sig: As directed.       Imaging & Referrals:  ORTHOPEDIC - INTERNAL         [1]   Allergies  Allergen Reactions    Azithromycin DIARRHEA     Stomach upset    Bactrim [Sulfamethoxazole W/Trimethoprim, Co-Trimoxazole] DIARRHEA     Stomach upset

## 2024-10-21 ENCOUNTER — TELEPHONE (OUTPATIENT)
Dept: INTERNAL MEDICINE CLINIC | Facility: CLINIC | Age: 39
End: 2024-10-21

## 2024-10-21 NOTE — TELEPHONE ENCOUNTER
Cervical spine MRI results received via fax from [facility]. Reviewed by Dr. Olmstead. Ordered by patient's neuro surg specialist Dr. Viveros. [employer] message sent to patient informing our office received results and to follow up with ordering provider.

## 2024-10-23 NOTE — PROGRESS NOTES
Quick Note:    Her labs show elevated inr, and she has bruising, she will need some more tests labs but recommend fu with hematology about it since this is a new problem  ______
no

## 2024-11-11 ENCOUNTER — MED REC SCAN ONLY (OUTPATIENT)
Dept: INTERNAL MEDICINE CLINIC | Facility: CLINIC | Age: 39
End: 2024-11-11

## 2024-12-07 DIAGNOSIS — F41.9 ANXIETY: ICD-10-CM

## 2024-12-07 RX ORDER — ALPRAZOLAM 0.5 MG
0.5 TABLET ORAL 2 TIMES DAILY PRN
Qty: 28 TABLET | Refills: 0 | Status: SHIPPED | OUTPATIENT
Start: 2024-12-07

## 2024-12-07 NOTE — TELEPHONE ENCOUNTER
Last time medication was refilled 04/06/203  Last office visit  10/18/2024  Next office visit due/scheduled No future appointments.    Medication not on protocol.

## 2025-02-24 ENCOUNTER — LAB ENCOUNTER (OUTPATIENT)
Dept: LAB | Age: 40
End: 2025-02-24
Attending: INTERNAL MEDICINE
Payer: COMMERCIAL

## 2025-02-24 DIAGNOSIS — E55.9 VITAMIN D DEFICIENCY: ICD-10-CM

## 2025-02-24 LAB — VIT D+METAB SERPL-MCNC: 38.6 NG/ML (ref 30–100)

## 2025-02-24 PROCEDURE — 82306 VITAMIN D 25 HYDROXY: CPT | Performed by: INTERNAL MEDICINE

## 2025-03-20 ENCOUNTER — TELEPHONE (OUTPATIENT)
Dept: OBGYN CLINIC | Facility: CLINIC | Age: 40
End: 2025-03-20

## 2025-03-20 ENCOUNTER — OFFICE VISIT (OUTPATIENT)
Dept: OBGYN CLINIC | Facility: CLINIC | Age: 40
End: 2025-03-20

## 2025-03-20 VITALS
HEIGHT: 63 IN | WEIGHT: 133 LBS | BODY MASS INDEX: 23.57 KG/M2 | SYSTOLIC BLOOD PRESSURE: 119 MMHG | HEART RATE: 76 BPM | DIASTOLIC BLOOD PRESSURE: 83 MMHG

## 2025-03-20 DIAGNOSIS — N92.0 MENORRHAGIA WITH REGULAR CYCLE: ICD-10-CM

## 2025-03-20 DIAGNOSIS — Z01.419 ENCOUNTER FOR WELL WOMAN EXAM WITH ROUTINE GYNECOLOGICAL EXAM: Primary | ICD-10-CM

## 2025-03-20 DIAGNOSIS — N92.0 MENORRHAGIA WITH REGULAR CYCLE: Primary | ICD-10-CM

## 2025-03-20 DIAGNOSIS — N90.89 CLITORAL IRRITATION: ICD-10-CM

## 2025-03-20 DIAGNOSIS — N90.7 LABIAL CYST: ICD-10-CM

## 2025-03-20 PROCEDURE — 3079F DIAST BP 80-89 MM HG: CPT | Performed by: OBSTETRICS & GYNECOLOGY

## 2025-03-20 PROCEDURE — 3008F BODY MASS INDEX DOCD: CPT | Performed by: OBSTETRICS & GYNECOLOGY

## 2025-03-20 PROCEDURE — 99395 PREV VISIT EST AGE 18-39: CPT | Performed by: OBSTETRICS & GYNECOLOGY

## 2025-03-20 PROCEDURE — 3074F SYST BP LT 130 MM HG: CPT | Performed by: OBSTETRICS & GYNECOLOGY

## 2025-03-20 NOTE — PROGRESS NOTES
[facility]  Obstetrics and Gynecology  Gynecology Visit    Chief Complaint   Patient presents with    Annual     Reviewed Preventative/Wellness form with patient.           Serenity Spaers is a 39 year old female who presents for annual exam.    LMP: 3/9/2025.    Menses regular: yes.    Menstrual flow normal: heavy with clots at first.    Birth control or HRT:  none.   Refill none  Last Pap Smear: 4/4/24.  Any history of abnormal paps: no   Last MMG: no  Any Medication Refills needed today?: none  Sleep: 7 hours.    Diet: balanced.    Exercise: yes.   Screening labs/Blood work today: n/a.     Colonoscopy (if over 46 y/o): n/a.   Gardasil:(age 9-46 y/o) no.   Genetic Cancer screen (if indicated): n/a.   Flu (Aug-April): declines.TDAP (every 10 years) 2016.      Additional Problems/concerns: Pt c/o of L pelvic pain during intercourse sometimes and state clitoris is dry and sometimes itchy     Next Appt: scheduled    Immunization History   Administered Date(s) Administered    Covid-19 Vaccine Pfizer 30 mcg/0.3 ml 04/12/2021, 05/04/2021, 12/30/2021    FLULAVAL 6 months & older 0.5 ml Prefilled syringe (23877) 02/07/2018, 09/24/2019    FLUZONE 6 months and older PFS 0.5 ml (48846) 02/07/2018, 10/03/2018, 09/24/2019, 10/01/2020    Fluarix 6 Months And Older 0.5 ml prefilled syringe (73244) 10/01/2020    Flublok Quad Influenza Vaccine (02332) 10/25/2021    Influenza 11/11/2014    Pneumococcal (Prevnar 13) 05/23/2018    Pneumovax 23 03/18/2021    TDAP 06/28/2016    Tb Intradermal Test 03/28/2008         Current Outpatient Medications:     ALPRAZolam (XANAX) 0.5 MG Oral Tab, Take 1 tablet (0.5 mg total) by mouth 2 (two) times daily as needed for Anxiety., Disp: 28 tablet, Rfl: 0    FIBER OR, Take by mouth., Disp: , Rfl:     dexamethasone 0.5 MG/5ML Oral Solution, Take 5 mL (0.5 mg total) by mouth 3 (three) times daily., Disp: 240 mL, Rfl: 0    albuterol 108 (90 Base) MCG/ACT Inhalation Aero Soln, Inhale 2 puffs into the  lungs every 6 (six) hours as needed for Wheezing., Disp: 1 each, Rfl: 0    Multiple Vitamin (MULTIVITAMIN ADULT OR), Take by mouth daily., Disp: , Rfl:     methylPREDNISolone (MEDROL) 4 MG Oral Tablet Therapy Pack, As directed. (Patient not taking: Reported on 3/20/2025), Disp: 1 each, Rfl: 0    Allergies[1]    OB History    Para Term  AB Living   2 2 2 0 0 2   SAB IAB Ectopic Multiple Live Births   0 0 0 0 2      # Outcome Date GA Lbr Juan/2nd Weight Sex Type Anes PTL Lv   2 Term 16 38w5d  6 lb 9.8 oz (3 kg) M CS-LTranv Spinal N MIGUE      Apgar1: 7  Apgar5: 8   1 Term 01/31/15 37w6d 16:32 / 01:52 7 lb 0.9 oz (3.2 kg) M CS-LTranv EPI N MIGUE      Complications: Variable decelerations, Fetal tachycardia      Apgar1: 8  Apgar5: 9       Gyn History   Hx Prior Abnormal Pap: No  Pap Date: 24  Pap Result Notes: Normal   Menarche: 15 (3/20/2025  9:58 AM)  Period Cycle (Days): 28-30 days (3/20/2025  9:58 AM)  Period Duration (Days): 5-7 days (3/20/2025  9:58 AM)  Period Flow: heavy first couple days then tapers down (3/20/2025  9:58 AM)  Use of Birth Control (if yes, specify type): Tubal Ligation (3/20/2025  9:58 AM)  Hx Prior Abnormal Pap: No (3/20/2025  9:58 AM)  Pap Date: 24 (3/20/2025  9:58 AM)  Pap Result Notes: Normal (3/20/2025  9:58 AM)        Latest Ref Rng & Units 2024    11:55 AM 3/31/2023     2:59 PM 3/23/2022    10:09 AM 2/10/2020     4:43 PM 2019    11:11 AM   RECENT PAP RESULTS   INTERPRETATION/RESULT:  NEGATIVE FOR INTRAEPITHELIAL LESION OR MALIGNANCY.  Negative for intraepithelial lesion or malignancy  Negative for intraepithelial lesion or malignancy  Negative for intraepithelial lesion or malignancy  Negative for intraepithelial lesion or malignancy  Fungal organisms morphologically consistent with Candida spp.    HPV Negative Negative  Negative  Negative  Negative  Negative            Past Medical History:    Allergic rhinitis - cat,ragweed,weeds,dust mites    Anemia     during pregnancy    Anxiety    no meds    Asthma (HCC)    B12 deficiency    Cyst of ovary, left    Depression    18-20yo, not treated with meds, depression & anxiety    Easy bruising    Exercise-induced asthma (HCC)    TMJ (temporomandibular joint syndrome)    Vitamin D deficiency       Past Surgical History:   Procedure Laterality Date      2015, 2016    Colonoscopy N/A 3/16/2018    Procedure: COLONOSCOPY;  Surgeon: Jayjay Lozoya MD;  Location: [facility] ENDOSCOPY    Colonoscopy N/A 2023    Procedure: COLONOSCOPY;  Surgeon: Jayjay Lozoya MD;  Location: [facility] ENDOSCOPY    Special service or report      warts removed on rt foot    Tubal ligation         Family History   Problem Relation Age of Onset    Heart Disorder Paternal Grandfather     High Cholesterol Paternal Grandfather     Other (Other) Maternal Grandmother         osteoarthritis     Hypertension Maternal Grandfather     Cancer Maternal Grandfather         lung    High Cholesterol Paternal Grandmother     Heart Disorder Father     Cancer Maternal Aunt 50        colon ca        Tobacco  Allergies  Meds  Med Hx  Surg Hx  Fam Hx  Soc Hx        Social History     Socioeconomic History    Marital status:      Spouse name: Not on file    Number of children: 2    Years of education: Not on file    Highest education level: Not on file   Occupational History    Occupation:    Tobacco Use    Smoking status: Never     Passive exposure: Never    Smokeless tobacco: Never   Vaping Use    Vaping status: Never Used   Substance and Sexual Activity    Alcohol use: Yes     Comment: social     Drug use: No    Sexual activity: Yes     Partners: Male     Birth control/protection: Tubal Ligation   Other Topics Concern     Service Not Asked    Blood Transfusions Not Asked    Caffeine Concern Yes     Comment: 2 cups a day    Occupational Exposure Not Asked    Hobby Hazards Not Asked    Sleep Concern Not Asked    Stress  Concern Not Asked    Weight Concern Not Asked    Special Diet Not Asked    Back Care Not Asked    Exercise No     Comment: 5x a week    Bike Helmet Not Asked    Seat Belt Yes    Self-Exams Not Asked   Social History Narrative    Not on file     Social Drivers of Health     Food Insecurity: Unknown (3/20/2025)    NCSS - Food Insecurity     Worried About Running Out of Food in the Last Year: No     Ran Out of Food in the Last Year: Not on file   Transportation Needs: No Transportation Needs (3/20/2025)    NCSS - Transportation     Lack of Transportation: No   Stress: Not on file   Housing Stability: Not At Risk (3/20/2025)    NCSS - Housing/Utilities     Has Housing: Yes     Worried About Losing Housing: No     Unable to Get Utilities: No       /83   Pulse 76   Ht 5' 3\" (1.6 m)   Wt 133 lb (60.3 kg)   LMP 03/09/2025 (Approximate)   BMI 23.56 kg/m²     Wt Readings from Last 3 Encounters:   03/20/25 133 lb (60.3 kg)   10/18/24 128 lb (58.1 kg)   04/08/24 130 lb (59 kg)         Health Maintenance   Topic Date Due    Influenza Vaccine (1) 08/01/2021    Screen for Cervical Cancer 11/05/2021    DTaP,Tdap and Td Vaccines (3 - Td or Tdap) 03/18/2025    Hepatitis C Screening Completed    HIV Screening Completed    COVID-19 Vaccine Completed     Review of Systems   General: Present- Feeling well. Not Present- Chills, Fever, Weight Gain and Weight Loss.  HEENT: Not Present- Headache and Sore Throat.  Respiratory: Not Present- Cough, Difficulty Breathing, Hemoptysis and Sputum Production.  Cardiovascular: Not Present- Chest Pain, Elevated Blood Pressure, Fainting / Blacking Out and Shortness of Breath.  Gastrointestinal: Not Present- Constipation, Diarrhea, Nausea and Vomiting.  Female Genitourinary: Not Present- Discharge, Dysuria and Frequency.  Musculoskeletal: Not Present- Leg Cramps and Swelling of Extremities.  Neurological: Not Present- Dizziness and Headaches.  Psychiatric: Not Present- Anxiety and  Depression.  Endocrine: Not Present- Appetite Changes, Hair Changes and Thyroid Problems.  Hematology: Not Present- Easy Bruising and Excessive bleeding.  All other systems negative     Physical Exam The physical exam findings are as follows:     General   Mental Status - Alert. General Appearance - Cooperative. Orientation - Oriented X4. Build & Nutrition - Well nourished.    Head and Neck  Thyroid   Gland Characteristics - normal size and consistency.    Chest and Lung Exam   Inspection:   Chest Wall: - Normal.  Percussion:   Quality and Intensity: - Percussion normal.  Palpation: - Palpation normal.  Auscultation:   Breath sounds: - Normal.  Adventitious sounds: - No Adventitious sounds.    Breast   Nipples: Characteristics - Bilateral - Normal. Discharge - Bilateral - None.  Breast - Bilateral - Symmetric.    Cardiovascular   Auscultation: Rhythm - Regular. Heart Sounds - Normal heart sounds.  Murmurs & Other Heart Sounds: Auscultation of the heart reveals - No Murmurs.    Abdomen   Inspection: Inspection of the abdomen reveals - No Hernias. Previous .  Palpation/Percussion: Palpation and Percussion of the abdomen reveal - Non Tender and No Palpable abdominal masses.  Liver: - Normal.  Auscultation: Auscultation of the abdomen reveals - Bowel sounds normal.    Female Genitourinary     External Genitalia   Perineum - Normal. Bartholin's Gland - Bilateral - Normal. Clitoris - Normal.  Introitus: Characteristics - No Cystocele, Enterocele or Rectocele. Discharge - None.  Labia Majora: Lesions - Bilateral - None. Characteristics - Bilateral - Normal.  Labia Minora: Lesions - Bilateral - None. Characteristics - Bilateral - Normal.  Urethra: Characteristics - Normal. Discharge - None.  [place] Gland - Bilateral - Normal.  Vulva: Characteristics - Normal. Lesions - None.    Speculum & Bimanual   Vagina:   Vaginal Wall: - Normal.  Vaginal Lesions - None. Vaginal Mucosa - Normal.  Cervix: Characteristics - No  Motion tenderness. Discharge - None.  Uterus: Characteristics - Normal. Position - Midposition.  Adnexa: Characteristics - Bilateral - Normal. Masses - No Adnexal Masses.    Rectal   Anorectal Exam: External - normal external exam.    Peripheral Vascular   Upper Extremity:   Palpation: - Pulses bilaterally normal.  Lower Extremity: Inspection - Bilateral - Inspection Normal.  Palpation: Edema - Bilateral - No edema.    Neurologic   Mental Status: - Normal.    Lymphatic  General Lymphatics   Description - Normal .       1. Encounter for well woman exam with routine gynecological exam    2. Menorrhagia with regular cycle    3. Clitoral irritation        -Educated patient about vulvar care  -Advised patient to do routine sitz baths and apply aquaphor on the irritated areas  -Vulvar care handout and Novasure handout provided to patient  -Discuss ablation procedure, risks and benefits - Patient was provided with informed consent for surgery including a review of the proposed surgery and all possibilities.  A discussion of the risks of the procedure, benefits, side effects, and success were addressed.  Alternative treatments were discussed as well.  All questions were answered.  Patient is to proceed with surgery.      -Surgery scheduler will call pt regarding ablation procedure         Marilee SOLORZANO-S      Patient seen and examined, Agree with assessment and plan of care documented by PA student.     Jeri Zavala MD             [1]   Allergies  Allergen Reactions    Azithromycin DIARRHEA     Stomach upset    Bactrim [Sulfamethoxazole W/Trimethoprim, Co-Trimoxazole] DIARRHEA     Stomach upset

## 2025-03-21 ENCOUNTER — TELEPHONE (OUTPATIENT)
Dept: INTERNAL MEDICINE CLINIC | Facility: CLINIC | Age: 40
End: 2025-03-21

## 2025-03-21 DIAGNOSIS — Z00.00 LABORATORY EXAMINATION ORDERED AS PART OF A ROUTINE GENERAL MEDICAL EXAMINATION: Primary | ICD-10-CM

## 2025-03-21 NOTE — TELEPHONE ENCOUNTER
Preferred Lab: [facility]     LOV: 10/18/2024  Next OV & Reason:  04/11/2025-physical    Patient was notified to fast 8-10 hours drinking only water/black coffee prior to having labs drawn and may need to submit urine sample.  Hemoglobin A1C will be ordered if patient has diabetes or prediabetes.  Lab orders will be placed by end of day:  yes  Patient requesting A1C: no   Patient informed insurance may not cover A1C and they may be responsible for cost if denied by insurance:  yes   Patient requesting return call:  no

## 2025-03-21 NOTE — TELEPHONE ENCOUNTER
I spoke with the patient, confirmed date and time for her procedure. I also sent a minor surgical case letter via [employer]     Surgical case request has been sent     Via availity prior authorization is not needed, reference number [id number]

## 2025-03-24 ENCOUNTER — OFFICE VISIT (OUTPATIENT)
Dept: INTERNAL MEDICINE CLINIC | Facility: CLINIC | Age: 40
End: 2025-03-24
Payer: COMMERCIAL

## 2025-03-24 ENCOUNTER — HOSPITAL ENCOUNTER (OUTPATIENT)
Dept: GENERAL RADIOLOGY | Age: 40
Discharge: HOME | End: 2025-03-24
Attending: PHYSICIAN ASSISTANT
Payer: COMMERCIAL

## 2025-03-24 VITALS
WEIGHT: 134 LBS | DIASTOLIC BLOOD PRESSURE: 70 MMHG | RESPIRATION RATE: 18 BRPM | SYSTOLIC BLOOD PRESSURE: 122 MMHG | OXYGEN SATURATION: 99 % | TEMPERATURE: 98 F | HEART RATE: 58 BPM | HEIGHT: 63 IN | BODY MASS INDEX: 23.74 KG/M2

## 2025-03-24 DIAGNOSIS — K59.09 CHRONIC CONSTIPATION: Primary | ICD-10-CM

## 2025-03-24 DIAGNOSIS — K59.09 CHRONIC CONSTIPATION: ICD-10-CM

## 2025-03-24 DIAGNOSIS — R10.32 LLQ PAIN: ICD-10-CM

## 2025-03-24 PROCEDURE — 3008F BODY MASS INDEX DOCD: CPT | Performed by: PHYSICIAN ASSISTANT

## 2025-03-24 PROCEDURE — 3078F DIAST BP <80 MM HG: CPT | Performed by: PHYSICIAN ASSISTANT

## 2025-03-24 PROCEDURE — 3074F SYST BP LT 130 MM HG: CPT | Performed by: PHYSICIAN ASSISTANT

## 2025-03-24 PROCEDURE — 99214 OFFICE O/P EST MOD 30 MIN: CPT | Performed by: PHYSICIAN ASSISTANT

## 2025-03-24 PROCEDURE — 74018 RADEX ABDOMEN 1 VIEW: CPT | Performed by: PHYSICIAN ASSISTANT

## 2025-03-24 NOTE — PROGRESS NOTES
Serenity Spears is a 39 year old female.  HPI:   Pt c/o nausea and abdominal pain   LLQ dull/aching pain, worse at night, comes and goes. Doesn't notice it when moving or exercising, but more persistent if she sits still. Feels nauseated as well.   Bowel frequency and consistency has not changed. She has chronic constipation, takes fiber gummies most days. Stools range from hard to softer, no change in color.   Denies fever, blood in stool, vomiting, severe pain, flank pain, urinary symptoms.   Current Outpatient Medications   Medication Sig Dispense Refill    ALPRAZolam (XANAX) 0.5 MG Oral Tab Take 1 tablet (0.5 mg total) by mouth 2 (two) times daily as needed for Anxiety. 28 tablet 0    FIBER OR Take by mouth.      dexamethasone 0.5 MG/5ML Oral Solution Take 5 mL (0.5 mg total) by mouth 3 (three) times daily. 240 mL 0    albuterol 108 (90 Base) MCG/ACT Inhalation Aero Soln Inhale 2 puffs into the lungs every 6 (six) hours as needed for Wheezing. 1 each 0    Multiple Vitamin (MULTIVITAMIN ADULT OR) Take by mouth daily.        Past Medical History:    Allergic rhinitis - cat,ragweed,weeds,dust mites    Anemia    during pregnancy    Anxiety    no meds    Asthma (HCC)    B12 deficiency    Cyst of ovary, left    Depression    [age]yo, not treated with meds, depression & anxiety    Easy bruising    Exercise-induced asthma (HCC)    TMJ (temporomandibular joint syndrome)    Vitamin D deficiency      Social History:  Social History     Socioeconomic History    Marital status:     Number of children: 2   Occupational History    Occupation:    Tobacco Use    Smoking status: Never     Passive exposure: Never    Smokeless tobacco: Never   Vaping Use    Vaping status: Never Used   Substance and Sexual Activity    Alcohol use: Yes     Comment: social     Drug use: No    Sexual activity: Yes     Partners: Male     Birth control/protection: Tubal Ligation   Other Topics Concern    Caffeine Concern Yes     Comment:  2 cups a day    Exercise No     Comment: 5x a week    Seat Belt Yes     Social Drivers of Health     Food Insecurity: Unknown (3/20/2025)    NCSS - Food Insecurity     Worried About Running Out of Food in the Last Year: No   Transportation Needs: No Transportation Needs (3/20/2025)    NCSS - Transportation     Lack of Transportation: No   Housing Stability: Not At Risk (3/20/2025)    NCSS - Housing/Utilities     Has Housing: Yes     Worried About Losing Housing: No     Unable to Get Utilities: No        REVIEW OF SYSTEMS:   GENERAL HEALTH: feels well otherwise. Denies fever, chills, unintentional weight change  SKIN: denies any unusual skin lesions or rashes  RESPIRATORY: denies shortness of breath with exertion, denies cough or wheezing  CARDIOVASCULAR: denies chest pain or palpitations, denies leg swelling  GI: denies abdominal pain and denies heartburn. Denies nausea, vomiting, diarrhea, constipation  NEURO: denies headaches, dizziness, weakness, syncope    EXAM:   /70   Pulse 58   Temp 98.2 °F (36.8 °C)   Resp 18   Ht 5' 3\" (1.6 m)   Wt 134 lb (60.8 kg)   LMP 03/09/2025 (Approximate)   SpO2 99%   BMI 23.74 kg/m²   GENERAL: well developed, well nourished,in no apparent distress  SKIN: no rashes,no suspicious lesions, warm and dry  HEENT: atraumatic, normocephalic,ears and throat are clear  NECK: supple,no adenopathy, no thyromegaly  LUNGS: clear to auscultation b/l no W/R/R  CARDIO: RRR without murmur  GI: good BS's,no masses, HSM, distension or tenderness  EXTREMITIES: no cyanosis, clubbing or edema  MUSCULOSKELETAL: FROM, no joint swelling or bony tenderness  NEURO: a/ox3, no focal deficits  PSYCH: mood and affect normal    ASSESSMENT AND PLAN:   1. Chronic constipation (Primary)  -     XR ABDOMEN (1 VIEW) (CPT=74018); Future; Expected date: 03/24/2025  2. LLQ pain   Differential includes constipation; diverticulitis unlikely given location and quality of pain. Check XR to evaluate stool burden  now and consider add;l imaging if sx worsen or fail to improve.     The patient indicates understanding of these issues and agrees to the plan.  Return if symptoms worsen or fail to improve.

## 2025-03-31 ENCOUNTER — LAB ENCOUNTER (OUTPATIENT)
Dept: LAB | Age: 40
End: 2025-03-31
Attending: INTERNAL MEDICINE
Payer: COMMERCIAL

## 2025-03-31 DIAGNOSIS — Z00.00 LABORATORY EXAMINATION ORDERED AS PART OF A ROUTINE GENERAL MEDICAL EXAMINATION: ICD-10-CM

## 2025-03-31 LAB
ALBUMIN SERPL-MCNC: 4.6 G/DL (ref 3.2–4.8)
ALBUMIN/GLOB SERPL: 1.6 {RATIO} (ref 1–2)
ALP LIVER SERPL-CCNC: 54 U/L
ALT SERPL-CCNC: 17 U/L
ANION GAP SERPL CALC-SCNC: 10 MMOL/L (ref 0–18)
AST SERPL-CCNC: 17 U/L (ref ?–34)
BASOPHILS # BLD AUTO: 0.03 X10(3) UL (ref 0–0.2)
BASOPHILS NFR BLD AUTO: 0.7 %
BILIRUB SERPL-MCNC: 0.5 MG/DL (ref 0.3–1.2)
BILIRUB UR QL STRIP.AUTO: NEGATIVE
BUN BLD-MCNC: 16 MG/DL (ref 9–23)
CALCIUM BLD-MCNC: 9.6 MG/DL (ref 8.7–10.6)
CHLORIDE SERPL-SCNC: 105 MMOL/L (ref 98–112)
CHOLEST SERPL-MCNC: 152 MG/DL (ref ?–200)
CLARITY UR REFRACT.AUTO: CLEAR
CO2 SERPL-SCNC: 26 MMOL/L (ref 21–32)
CREAT BLD-MCNC: 0.74 MG/DL
EGFRCR SERPLBLD CKD-EPI 2021: 106 ML/MIN/1.73M2 (ref 60–?)
EOSINOPHIL # BLD AUTO: 0.19 X10(3) UL (ref 0–0.7)
EOSINOPHIL NFR BLD AUTO: 4.5 %
ERYTHROCYTE [DISTWIDTH] IN BLOOD BY AUTOMATED COUNT: 12.6 %
FASTING PATIENT LIPID ANSWER: YES
FASTING STATUS PATIENT QL REPORTED: YES
GLOBULIN PLAS-MCNC: 2.9 G/DL (ref 2–3.5)
GLUCOSE BLD-MCNC: 93 MG/DL (ref 70–99)
GLUCOSE UR STRIP.AUTO-MCNC: NORMAL MG/DL
HCT VFR BLD AUTO: 38.6 %
HDLC SERPL-MCNC: 64 MG/DL (ref 40–59)
HGB BLD-MCNC: 13 G/DL
IMM GRANULOCYTES # BLD AUTO: 0.01 X10(3) UL (ref 0–1)
IMM GRANULOCYTES NFR BLD: 0.2 %
KETONES UR STRIP.AUTO-MCNC: NEGATIVE MG/DL
LDLC SERPL CALC-MCNC: 76 MG/DL (ref ?–100)
LEUKOCYTE ESTERASE UR QL STRIP.AUTO: NEGATIVE
LYMPHOCYTES # BLD AUTO: 1.58 X10(3) UL (ref 1–4)
LYMPHOCYTES NFR BLD AUTO: 37.8 %
MCH RBC QN AUTO: 28.9 PG (ref 26–34)
MCHC RBC AUTO-ENTMCNC: 33.7 G/DL (ref 31–37)
MCV RBC AUTO: 85.8 FL
MONOCYTES # BLD AUTO: 0.39 X10(3) UL (ref 0.1–1)
MONOCYTES NFR BLD AUTO: 9.3 %
NEUTROPHILS # BLD AUTO: 1.98 X10 (3) UL (ref 1.5–7.7)
NEUTROPHILS # BLD AUTO: 1.98 X10(3) UL (ref 1.5–7.7)
NEUTROPHILS NFR BLD AUTO: 47.5 %
NITRITE UR QL STRIP.AUTO: NEGATIVE
NONHDLC SERPL-MCNC: 88 MG/DL (ref ?–130)
OSMOLALITY SERPL CALC.SUM OF ELEC: 293 MOSM/KG (ref 275–295)
PH UR STRIP.AUTO: 5.5 [PH] (ref 5–8)
PLATELET # BLD AUTO: 191 10(3)UL (ref 150–450)
POTASSIUM SERPL-SCNC: 4 MMOL/L (ref 3.5–5.1)
PROT SERPL-MCNC: 7.5 G/DL (ref 5.7–8.2)
PROT UR STRIP.AUTO-MCNC: NEGATIVE MG/DL
RBC # BLD AUTO: 4.5 X10(6)UL
RBC UR QL AUTO: NEGATIVE
SODIUM SERPL-SCNC: 141 MMOL/L (ref 136–145)
SP GR UR STRIP.AUTO: 1.02 (ref 1–1.03)
TRIGL SERPL-MCNC: 57 MG/DL (ref 30–149)
TSI SER-ACNC: 1.1 UIU/ML (ref 0.55–4.78)
UROBILINOGEN UR STRIP.AUTO-MCNC: NORMAL MG/DL
VLDLC SERPL CALC-MCNC: 9 MG/DL (ref 0–30)
WBC # BLD AUTO: 4.2 X10(3) UL (ref 4–11)

## 2025-03-31 PROCEDURE — 80061 LIPID PANEL: CPT | Performed by: INTERNAL MEDICINE

## 2025-03-31 PROCEDURE — 81003 URINALYSIS AUTO W/O SCOPE: CPT | Performed by: INTERNAL MEDICINE

## 2025-03-31 PROCEDURE — 80050 GENERAL HEALTH PANEL: CPT | Performed by: INTERNAL MEDICINE

## 2025-04-04 ENCOUNTER — HOSPITAL ENCOUNTER (OUTPATIENT)
Dept: CT IMAGING | Age: 40
Discharge: HOME | End: 2025-04-04
Attending: PHYSICIAN ASSISTANT
Payer: COMMERCIAL

## 2025-04-04 DIAGNOSIS — R10.32 LLQ PAIN: ICD-10-CM

## 2025-04-04 PROCEDURE — 74177 CT ABD & PELVIS W/CONTRAST: CPT | Performed by: PHYSICIAN ASSISTANT

## 2025-04-04 RX ORDER — IOHEXOL 350 MG/ML
70 INJECTION, SOLUTION INTRAVENOUS
Status: COMPLETED | OUTPATIENT
Start: 2025-04-04 | End: 2025-04-04

## 2025-04-04 RX ADMIN — IOHEXOL 70 ML: 350 INJECTION, SOLUTION INTRAVENOUS at 10:24:00

## 2025-04-10 NOTE — PROGRESS NOTES
Subjective:   Patient ID: Serenity Spears is a 39 year old female.    HPI  HPI:   Serenity Spears is a 39 year old female who presents for a complete physical exam. Symptoms: denies discharge, itching, burning or dysuria, periods are regular. Patient reports asthma is controlled, no recent flares    PAST MEDICAL, SOCIAL, FAMILY HISTORIES REVIEWED WITH PT  .     Immunization History   Administered Date(s) Administered    Covid-19 Vaccine Pfizer 30 mcg/0.3 ml 04/12/2021, 05/04/2021, 12/30/2021    FLULAVAL 6 months & older 0.5 ml Prefilled syringe (60913) 02/07/2018, 09/24/2019    FLUZONE 6 months and older PFS 0.5 ml (02087) 02/07/2018, 10/03/2018, 09/24/2019, 10/01/2020    Fluarix 6 Months And Older 0.5 ml prefilled syringe (93242) 10/01/2020    Flublok Quad Influenza Vaccine (08129) 10/25/2021    Influenza 11/11/2014    Pneumococcal (Prevnar 13) 05/23/2018    Pneumovax 23 03/18/2021    TDAP 06/28/2016    Tb Intradermal Test 03/28/2008   Deferred Date(s) Deferred    FLUZONE 6 months and older PFS 0.5 ml (48760) 01/26/2017     Wt Readings from Last 6 Encounters:   04/11/25 132 lb (59.9 kg)   03/24/25 134 lb (60.8 kg)   03/20/25 133 lb (60.3 kg)   10/18/24 128 lb (58.1 kg)   04/08/24 130 lb (59 kg)   04/04/24 131 lb 2.8 oz (59.5 kg)     Body mass index is 23.38 kg/m².     Lab Results   Component Value Date    GLU 93 03/31/2025    GLU 88 03/21/2024    GLU 93 03/31/2023    GLUCOSE 78 09/03/2013     Lab Results   Component Value Date    CHOLEST 152 03/31/2025    CHOLEST 148 03/21/2024    CHOLEST 150 03/31/2023     Lab Results   Component Value Date    HDL 64 (H) 03/31/2025    HDL 61 (H) 03/21/2024    HDL 67 (H) 03/31/2023     Lab Results   Component Value Date    LDL 76 03/31/2025    LDL 73 03/21/2024    LDL 72 03/31/2023     Lab Results   Component Value Date    AST 17 03/31/2025    AST 17 03/21/2024    AST 14 (L) 03/31/2023     Lab Results   Component Value Date    ALT 17 03/31/2025    ALT 18 03/21/2024    ALT 25  03/31/2023       Current Medications[1]   Past Medical History[2]   Past Surgical History[3]   Family History[4]   Social History:   Short Social Hx on File[5]  Occ: yes. : yes. Children: yes.   Exercise:  twice per week, three times per week.  Diet: watches fats closely and watches sugar closely     REVIEW OF SYSTEMS:   GENERAL: feels well otherwise  A comprehensive 10 point review of systems was completed.     Pertinent positives and negatives noted in the HPI.      EXAM:   /70   Pulse 75   Temp 98.3 °F (36.8 °C)   Resp 16   Ht 5' 3\" (1.6 m)   Wt 132 lb (59.9 kg)   LMP 04/05/2025 (Approximate)   SpO2 98%   BMI 23.38 kg/m²   Body mass index is 23.38 kg/m².   GENERAL: well developed, well nourished,in no apparent distress  SKIN: no rashes,no suspicious lesions  HEENT: atraumatic, normocephalic,ears and throat are clear  EYES:PERRLA, EOMI, ,conjunctiva are clear  NECK: supple,no adenopathy,no bruits  LUNGS: clear to auscultation  CARDIO: RRR without murmur  GI: good BS's,no masses, HSM or tenderness  :deferred  MUSCULOSKELETAL: back is not tender  EXTREMITIES: no cyanosis, clubbing or edema  NEURO: Oriented times thre,motor and sensory are grossly intact    ASSESSMENT AND PLAN:   Serenity Spears is a 39 year old female who presents for a complete physical exam.  Pap and pelvic is up to date  Exercise-induced asthma (HCC) -controlled     Health maintenance, we reviewed her at goal fasting Lipids, CMP, and CBC.    Pt info handouts given for: exercise, low fat diet  Body mass index is 23.38 kg/m²., recommended low fat diet and aerobic exercise 30 minutes three times weekly.      The patient indicates understanding of these issues and agrees to the plan.  The patient is asked to return for CPX in 12 m.    History/Other:   Review of Systems  Current Medications[6]  Allergies:Allergies[7]    Objective:   Physical Exam    Assessment & Plan:   1. Annual physical exam    2. Exercise-induced asthma (HCC)         No orders of the defined types were placed in this encounter.      Meds This Visit:  Requested Prescriptions      No prescriptions requested or ordered in this encounter       Imaging & Referrals:  None         [1]   Current Outpatient Medications   Medication Sig Dispense Refill    ALPRAZolam (XANAX) 0.5 MG Oral Tab Take 1 tablet (0.5 mg total) by mouth 2 (two) times daily as needed for Anxiety. 28 tablet 0    FIBER OR Take by mouth.      dexamethasone 0.5 MG/5ML Oral Solution Take 5 mL (0.5 mg total) by mouth 3 (three) times daily. 240 mL 0    albuterol 108 (90 Base) MCG/ACT Inhalation Aero Soln Inhale 2 puffs into the lungs every 6 (six) hours as needed for Wheezing. 1 each 0    Multiple Vitamin (MULTIVITAMIN ADULT OR) Take by mouth daily.     [2]   Past Medical History:   Allergic rhinitis - cat,ragweed,weeds,dust mites    Anemia    during pregnancy    Anxiety    no meds    Asthma (HCC)    B12 deficiency    Cyst of ovary, left    Depression    [age]yo, not treated with meds, depression & anxiety    Easy bruising    Exercise-induced asthma (HCC)    TMJ (temporomandibular joint syndrome)    Vitamin D deficiency   [3]   Past Surgical History:  Procedure Laterality Date      2015, 2016    Colonoscopy N/A 3/16/2018    Procedure: COLONOSCOPY;  Surgeon: Jayjay Lozoya MD;  Location: [facility] ENDOSCOPY    Colonoscopy N/A 2023    Procedure: COLONOSCOPY;  Surgeon: Jayjay Lozoya MD;  Location: [facility] ENDOSCOPY    Special service or report      warts removed on rt foot    Tubal ligation     [4]   Family History  Problem Relation Age of Onset    Heart Disorder Paternal Grandfather     High Cholesterol Paternal Grandfather     Other (Other) Maternal Grandmother         osteoarthritis     Hypertension Maternal Grandfather     Cancer Maternal Grandfather         lung    High Cholesterol Paternal Grandmother     Heart Disorder Father     Cancer Maternal Aunt 50        colon ca   [5]   Social  History  Socioeconomic History    Marital status:     Number of children: 2   Occupational History    Occupation:    Tobacco Use    Smoking status: Never     Passive exposure: Never    Smokeless tobacco: Never   Vaping Use    Vaping status: Never Used   Substance and Sexual Activity    Alcohol use: Yes     Comment: social     Drug use: No    Sexual activity: Yes     Partners: Male     Birth control/protection: Tubal Ligation   Other Topics Concern    Caffeine Concern Yes     Comment: 2 cups a day    Exercise No     Comment: 5x a week    Seat Belt Yes     Social Drivers of Health     Food Insecurity: Unknown (3/20/2025)    NCSS - Food Insecurity     Worried About Running Out of Food in the Last Year: No   Transportation Needs: No Transportation Needs (3/20/2025)    NCSS - Transportation     Lack of Transportation: No   Housing Stability: Not At Risk (3/20/2025)    NCSS - Housing/Utilities     Has Housing: Yes     Worried About Losing Housing: No     Unable to Get Utilities: No   [6]   Current Outpatient Medications   Medication Sig Dispense Refill    ALPRAZolam (XANAX) 0.5 MG Oral Tab Take 1 tablet (0.5 mg total) by mouth 2 (two) times daily as needed for Anxiety. 28 tablet 0    FIBER OR Take by mouth.      dexamethasone 0.5 MG/5ML Oral Solution Take 5 mL (0.5 mg total) by mouth 3 (three) times daily. 240 mL 0    albuterol 108 (90 Base) MCG/ACT Inhalation Aero Soln Inhale 2 puffs into the lungs every 6 (six) hours as needed for Wheezing. 1 each 0    Multiple Vitamin (MULTIVITAMIN ADULT OR) Take by mouth daily.     [7]   Allergies  Allergen Reactions    Azithromycin DIARRHEA     Stomach upset    Bactrim [Sulfamethoxazole W/Trimethoprim, Co-Trimoxazole] DIARRHEA     Stomach upset

## 2025-04-11 ENCOUNTER — OFFICE VISIT (OUTPATIENT)
Dept: INTERNAL MEDICINE CLINIC | Facility: CLINIC | Age: 40
End: 2025-04-11
Payer: COMMERCIAL

## 2025-04-11 VITALS
HEART RATE: 75 BPM | DIASTOLIC BLOOD PRESSURE: 70 MMHG | BODY MASS INDEX: 23.39 KG/M2 | OXYGEN SATURATION: 98 % | HEIGHT: 63 IN | SYSTOLIC BLOOD PRESSURE: 114 MMHG | RESPIRATION RATE: 16 BRPM | WEIGHT: 132 LBS | TEMPERATURE: 98 F

## 2025-04-11 DIAGNOSIS — Z00.00 ANNUAL PHYSICAL EXAM: Primary | ICD-10-CM

## 2025-04-11 DIAGNOSIS — J45.990 EXERCISE-INDUCED ASTHMA (HCC): ICD-10-CM

## 2025-04-29 NOTE — H&P
[facility]  part of [facility]        HISTORY AND PHYSICAL        Subjective   Chief Complaint:  Menorrhagia with regular cycle and Labial cyst        History of Present Illness:    Serenity Spears is a  39 year old y/o  who presents for scheduled gyn procedure HYSTEROSCOPY DILATION AND CURETTAGE WITH NOVASURE ENDOMETRIAL ABLATION WITH CLITORAL BIOPSY and Endometrial ablation.  The patients complaints include Menorrhagia with regular cycle and Labial cyst.          Past Medical History[1]    Past Surgical History[2]    OB History    Para Term  AB Living   2 2 2 0 0 2   SAB IAB Ectopic Multiple Live Births   0 0 0 0 2       Allergies[3]    Medications - Current[4]      Family History[5]      REVIEW OF SYSTEMS:   CONSTITUTIONAL: Negative for fever, chills, diaphoresis, weakness, fatigue, weight loss, weight gain.  ALLERGIES: Negative for urticaria, hay fever, angioedema  EYES: Negative for blurry vision, decreased vision, loss of vision, eye pain, diplopia, photophobia, discharge  ENT: Negative for sore throat, nasal congestion, nasal discharge, epistaxis, tinnitus, hearing loss  CARDIOVASCULAR: Negative for chest pain, dyspnea on exertion, orthopnea, paroxysmal nocturnal dyspnea, edema, palpitations  RESPIRATORY: Negative for cough, hemoptysis, shortness of breath, pleuritic chest pain, wheezing  BREAST:  Denies breast mass, breast pain, nipple discharge or nipple pain.  ENDOCRINE: Negative for polydipsia/polyuria, palpitations, skin changes, temperature intolerance, unexpected weight changes  HEME-LYMPH: Negative for swollen lymph nodes, bleeding, bruising  GI: Negative abdominal pain, flank pain, nausea, vomiting, diarrhea, constipation, black stool, blood in stool  : Negative for dysuria, frequency/urgency, hematuria, genital discharge, irregular menses, pelvic pain  NEURO: Negative for dizzy/vertigo, headache, focal weakness, numbness/tingling, speech problems, loss of  consciousness, confusion, memory loss  MUSCULOSKELETAL: Negative for back pain, joint pain, joint stiffness, joint swelling, muscle pain, muscle weakness  SKIN: Negative for rash, itching, hives  PSYCH: Negative for anxiety, depression, physical abuse, sexual abuse      PHYSICAL EXAM:    Ht 5' 3\" (1.6 m)   Wt 132 lb   LMP 04/05/2025 (Approximate)   BMI 23.38 kg/m²        General   Mental Status - Alert. General Appearance - Cooperative. Orientation - Oriented X4. Build & Nutrition - Well nourished.    Head and Neck  Thyroid   Gland Characteristics - normal size and consistency.    Chest and Lung Exam   Inspection:   Chest Wall: - Normal.  Percussion:   Quality and Intensity: - Percussion normal.  Palpation: - Palpation normal.  Auscultation:   Breath sounds: - Normal.  Adventitious sounds: - No Adventitious sounds.      Cardiovascular   Auscultation: Rhythm - Regular. Heart Sounds - Normal heart sounds.  Murmurs & Other Heart Sounds: Auscultation of the heart reveals - No Murmurs.      Abdomen   Inspection: Inspection of the abdomen reveals - No Hernias. Incisional scars - No incisional scars.  Palpation/Percussion: Palpation and Percussion of the abdomen reveal - Non Tender and No Palpable abdominal masses.  Liver: - Normal.  Auscultation: Auscultation of the abdomen reveals - Bowel sounds normal.      Female Genitourinary     External Genitalia   Perineum - Normal. Bartholin's Gland - Bilateral - Normal. Clitoris - Normal.  Introitus: Characteristics - No Cystocele, Enterocele or Rectocele. Discharge - None.  Labia Majora: Lesions - Bilateral - None. Characteristics - Bilateral - Normal.  Labia Minora: Lesions - Bilateral - None. Characteristics - Bilateral - Normal.  Urethra: Characteristics - Normal. Discharge - None.  [place] Gland - Bilateral - Normal.  Vulva: Characteristics - Normal. Lesions - None.    Speculum & Bimanual   Vagina:   Vaginal Wall: - Normal.  Vaginal Lesions - None. Vaginal Mucosa -  Normal.  Cervix: Characteristics - No Motion tenderness. Discharge - None.  Uterus: Characteristics - Normal. Position - Midposition.  Adnexa: Characteristics - Bilateral - Normal. Masses - No Adnexal Masses.      Peripheral Vascular   Upper Extremity:   Palpation: - Pulses bilaterally normal.  Lower Extremity: Inspection - Bilateral - Inspection Normal.  Palpation: Edema - Bilateral - No edema.      Neurologic   Mental Status: - Normal.      Lymphatic  General Lymphatics   Description - Normal .      Lab Results   Component Value Date    WBC 4.2 03/31/2025    HGB 13.0 03/31/2025    HCT 38.6 03/31/2025    .0 03/31/2025    MCV 85.8 03/31/2025    RDW 12.6 03/31/2025     No components found for: \"ABOGROUP\", \"RHTYPE\", \"RUBIGG\"            Assessment and Plan:    Menorrhagia with regular cycle and Labial cyst          The patient was counseled regarding surgery and the procedure (HYSTEROSCOPY DILATION AND CURETTAGE WITH NOVASURE ENDOMETRIAL ABLATION WITH CLITORAL BIOPSY and Endometrial ablation) was reviewed at length.   Risks of procedure including bleeding/need for blood transfusion (<1%), infection (5-10%), damage to other organs/bowel/bladder/ureters (<1%),  and anesthesia were reviewed.  Benefits, alternatives, & indications were also discussed.  All questions were answered.  Written information was provided.      Marquita JONESS      Patient seen and examined, Agree with assessment and plan of care documented by PA student.     Jeri Zavala MD           [1]   Past Medical History:   Allergic rhinitis - cat,ragweed,weeds,dust mites    Anemia    during pregnancy    Anxiety    no meds    Asthma (HCC)    B12 deficiency    Cyst of ovary, left    Depression    19-20yo, not treated with meds, depression & anxiety    Easy bruising    Exercise-induced asthma (HCC)    TMJ (temporomandibular joint syndrome)    Visual impairment    Vitamin D deficiency   [2]   Past Surgical History:  Procedure Laterality Date       2015, 2016    Colonoscopy N/A 2018    Procedure: COLONOSCOPY;  Surgeon: Jayjay Lozoya MD;  Location: [facility] ENDOSCOPY    Colonoscopy N/A 2023    Procedure: COLONOSCOPY;  Surgeon: Jayjay Lozoya MD;  Location: [facility] ENDOSCOPY    Colonoscopy      Special service or report      warts removed on rt foot    Tubal ligation     [3]   Allergies  Allergen Reactions    Azithromycin DIARRHEA     Stomach upset    Bactrim [Sulfamethoxazole W/Trimethoprim, Co-Trimoxazole] DIARRHEA     Stomach upset   [4]   Current Outpatient Medications:     PREBIOTIC PRODUCT OR, Take by mouth., Disp: , Rfl:     Turmeric (QC TUMERIC COMPLEX OR), Take by mouth., Disp: , Rfl:     ALPRAZolam (XANAX) 0.5 MG Oral Tab, Take 1 tablet (0.5 mg total) by mouth 2 (two) times daily as needed for Anxiety., Disp: 28 tablet, Rfl: 0    FIBER OR, Take by mouth., Disp: , Rfl:     dexamethasone 0.5 MG/5ML Oral Solution, Take 5 mL (0.5 mg total) by mouth 3 (three) times daily., Disp: 240 mL, Rfl: 0    Multiple Vitamin (MULTIVITAMIN ADULT OR), Take by mouth in the morning., Disp: , Rfl:     albuterol 108 (90 Base) MCG/ACT Inhalation Aero Soln, Inhale 2 puffs into the lungs every 6 (six) hours as needed for Wheezing., Disp: 1 each, Rfl: 0  [5]   Family History  Problem Relation Age of Onset    Heart Disorder Paternal Grandfather     High Cholesterol Paternal Grandfather     Other (Other) Maternal Grandmother         osteoarthritis     Hypertension Maternal Grandfather     Cancer Maternal Grandfather         lung    High Cholesterol Paternal Grandmother     Heart Disorder Father     Cancer Maternal Aunt 50        colon ca

## 2025-05-05 ENCOUNTER — ANESTHESIA EVENT (OUTPATIENT)
Dept: SURGERY | Facility: HOSPITAL | Age: 40
End: 2025-05-05
Payer: COMMERCIAL

## 2025-05-05 ENCOUNTER — HOSPITAL ENCOUNTER (OUTPATIENT)
Facility: HOSPITAL | Age: 40
Setting detail: HOSPITAL OUTPATIENT SURGERY
Discharge: HOME | End: 2025-05-05
Attending: OBSTETRICS & GYNECOLOGY | Admitting: OBSTETRICS & GYNECOLOGY
Payer: COMMERCIAL

## 2025-05-05 ENCOUNTER — ANESTHESIA (OUTPATIENT)
Dept: SURGERY | Facility: HOSPITAL | Age: 40
End: 2025-05-05
Payer: COMMERCIAL

## 2025-05-05 VITALS
WEIGHT: 134 LBS | TEMPERATURE: 98 F | BODY MASS INDEX: 22.33 KG/M2 | HEIGHT: 65 IN | RESPIRATION RATE: 18 BRPM | HEART RATE: 57 BPM | DIASTOLIC BLOOD PRESSURE: 66 MMHG | OXYGEN SATURATION: 100 % | SYSTOLIC BLOOD PRESSURE: 106 MMHG

## 2025-05-05 DIAGNOSIS — N92.0 MENORRHAGIA WITH REGULAR CYCLE: ICD-10-CM

## 2025-05-05 DIAGNOSIS — N90.7 LABIAL CYST: ICD-10-CM

## 2025-05-05 PROBLEM — N90.89 IRRITATION OF CLITORIS: Status: ACTIVE | Noted: 2025-05-05

## 2025-05-05 LAB — B-HCG UR QL: NEGATIVE

## 2025-05-05 PROCEDURE — 56605 BIOPSY OF VULVA/PERINEUM: CPT | Performed by: OBSTETRICS & GYNECOLOGY

## 2025-05-05 PROCEDURE — 58563 HYSTEROSCOPY ABLATION: CPT | Performed by: OBSTETRICS & GYNECOLOGY

## 2025-05-05 RX ORDER — MORPHINE SULFATE 10 MG/ML
6 INJECTION, SOLUTION INTRAMUSCULAR; INTRAVENOUS EVERY 10 MIN PRN
Status: DISCONTINUED | OUTPATIENT
Start: 2025-05-05 | End: 2025-05-05

## 2025-05-05 RX ORDER — KETOROLAC TROMETHAMINE 30 MG/ML
INJECTION, SOLUTION INTRAMUSCULAR; INTRAVENOUS AS NEEDED
Status: DISCONTINUED | OUTPATIENT
Start: 2025-05-05 | End: 2025-05-05 | Stop reason: SURG

## 2025-05-05 RX ORDER — SODIUM CHLORIDE, SODIUM LACTATE, POTASSIUM CHLORIDE, CALCIUM CHLORIDE 600; 310; 30; 20 MG/100ML; MG/100ML; MG/100ML; MG/100ML
INJECTION, SOLUTION INTRAVENOUS CONTINUOUS
Status: DISCONTINUED | OUTPATIENT
Start: 2025-05-05 | End: 2025-05-05

## 2025-05-05 RX ORDER — MIDAZOLAM HYDROCHLORIDE 1 MG/ML
INJECTION INTRAMUSCULAR; INTRAVENOUS AS NEEDED
Status: DISCONTINUED | OUTPATIENT
Start: 2025-05-05 | End: 2025-05-05 | Stop reason: SURG

## 2025-05-05 RX ORDER — HYDROMORPHONE HYDROCHLORIDE 1 MG/ML
0.2 INJECTION, SOLUTION INTRAMUSCULAR; INTRAVENOUS; SUBCUTANEOUS EVERY 5 MIN PRN
Status: DISCONTINUED | OUTPATIENT
Start: 2025-05-05 | End: 2025-05-05

## 2025-05-05 RX ORDER — ACETAMINOPHEN 500 MG
1000 TABLET ORAL ONCE
Status: COMPLETED | OUTPATIENT
Start: 2025-05-05 | End: 2025-05-05

## 2025-05-05 RX ORDER — HYDROMORPHONE HYDROCHLORIDE 1 MG/ML
0.6 INJECTION, SOLUTION INTRAMUSCULAR; INTRAVENOUS; SUBCUTANEOUS EVERY 5 MIN PRN
Status: DISCONTINUED | OUTPATIENT
Start: 2025-05-05 | End: 2025-05-05

## 2025-05-05 RX ORDER — PROCHLORPERAZINE EDISYLATE 5 MG/ML
5 INJECTION INTRAMUSCULAR; INTRAVENOUS EVERY 8 HOURS PRN
Status: DISCONTINUED | OUTPATIENT
Start: 2025-05-05 | End: 2025-05-05

## 2025-05-05 RX ORDER — MORPHINE SULFATE 4 MG/ML
2 INJECTION, SOLUTION INTRAMUSCULAR; INTRAVENOUS EVERY 10 MIN PRN
Status: DISCONTINUED | OUTPATIENT
Start: 2025-05-05 | End: 2025-05-05

## 2025-05-05 RX ORDER — MORPHINE SULFATE 4 MG/ML
4 INJECTION, SOLUTION INTRAMUSCULAR; INTRAVENOUS EVERY 10 MIN PRN
Status: DISCONTINUED | OUTPATIENT
Start: 2025-05-05 | End: 2025-05-05

## 2025-05-05 RX ORDER — ONDANSETRON 2 MG/ML
4 INJECTION INTRAMUSCULAR; INTRAVENOUS EVERY 6 HOURS PRN
Status: DISCONTINUED | OUTPATIENT
Start: 2025-05-05 | End: 2025-05-05

## 2025-05-05 RX ORDER — NALOXONE HYDROCHLORIDE 0.4 MG/ML
80 INJECTION, SOLUTION INTRAMUSCULAR; INTRAVENOUS; SUBCUTANEOUS AS NEEDED
Status: DISCONTINUED | OUTPATIENT
Start: 2025-05-05 | End: 2025-05-05

## 2025-05-05 RX ORDER — HYDROMORPHONE HYDROCHLORIDE 1 MG/ML
0.4 INJECTION, SOLUTION INTRAMUSCULAR; INTRAVENOUS; SUBCUTANEOUS EVERY 5 MIN PRN
Status: DISCONTINUED | OUTPATIENT
Start: 2025-05-05 | End: 2025-05-05

## 2025-05-05 RX ADMIN — KETOROLAC TROMETHAMINE 30 MG: 30 INJECTION, SOLUTION INTRAMUSCULAR; INTRAVENOUS at 11:29:00

## 2025-05-05 RX ADMIN — SODIUM CHLORIDE, SODIUM LACTATE, POTASSIUM CHLORIDE, CALCIUM CHLORIDE: 600; 310; 30; 20 INJECTION, SOLUTION INTRAVENOUS at 11:38:00

## 2025-05-05 RX ADMIN — MIDAZOLAM HYDROCHLORIDE 2 MG: 1 INJECTION INTRAMUSCULAR; INTRAVENOUS at 11:08:00

## 2025-05-05 NOTE — ANESTHESIA PREPROCEDURE EVALUATION
Anesthesia PreOp Note    HPI:     Serenity Spears is a 39 year old female who presents for preoperative consultation requested by: Jeri Zavala MD    Date of Surgery: 5/5/2025    Procedure(s):  Hysteroscopy, dilation and curettage with novasure endometrial ablation with clitoral biopsy  Endometrial ablation  Indication: Menorrhagia with regular cycle [N92.0]  Labial cyst [N90.7]    Relevant Problems   No relevant active problems       NPO:  Last Liquid Consumption Date: 05/05/25  Last Liquid Consumption Time: 0730  Last Solid Consumption Date: 05/04/25  Last Solid Consumption Time: 1900  Last Liquid Consumption Date: 05/05/25          History Review:  Patient Active Problem List    Diagnosis Date Noted    Family history of colon cancer 03/17/2018    Exercise-induced asthma (HCC)     Cyst of ovary, left 06/16/2017    Allergic rhinitis - cat,ragweed,weeds,dust mites 09/04/2013       Past Medical History[1]    Past Surgical History[2]    Prescriptions Prior to Admission[3]  Current Medications and Prescriptions Ordered in Epic[4]    Allergies[5]    Family History[6]  Social Hx on file[7]    Available pre-op labs reviewed.  Lab Results   Component Value Date    WBC 4.2 03/31/2025    RBC 4.50 03/31/2025    HGB 13.0 03/31/2025    HCT 38.6 03/31/2025    MCV 85.8 03/31/2025    MCH 28.9 03/31/2025    MCHC 33.7 03/31/2025    RDW 12.6 03/31/2025    .0 03/31/2025    URINEPREG Negative 05/05/2025     Lab Results   Component Value Date     03/31/2025    K 4.0 03/31/2025     03/31/2025    CO2 26.0 03/31/2025    BUN 16 03/31/2025    CREATSERUM 0.74 03/31/2025    GLU 93 03/31/2025    CA 9.6 03/31/2025          Vital Signs:  Body mass index is 22.3 kg/m².   height is 1.651 m (5' 5\") and weight is 60.8 kg (134 lb). Her oral temperature is 98.1 °F (36.7 °C). Her blood pressure is 125/81 and her pulse is 78. Her respiration is 16 and oxygen saturation is 100%.   Vitals:    04/29/25 1611 05/05/25 1004   BP:   125/81   Pulse:  78   Resp:  16   Temp:  98.1 °F (36.7 °C)   TempSrc:  Oral   SpO2:  100%   Weight: 59.9 kg (132 lb) 60.8 kg (134 lb)   Height: 1.6 m (5' 3\") 1.651 m (5' 5\")        Anesthesia Evaluation     Patient summary reviewed and Nursing notes reviewed    Airway   Mallampati: II  Dental      Pulmonary - negative ROS and normal exam   Cardiovascular - normal exam  Exercise tolerance: good    NYHA Classification: I    Neuro/Psych - negative ROS     GI/Hepatic/Renal - negative ROS     Endo/Other - negative ROS   Abdominal                  Anesthesia Plan:   ASA:  2  Plan:   MAC  Post-op Pain Management: IV analgesics      I have informed Serenity Spears and/or legal guardian or family member of the nature of the anesthetic plan, benefits, risks including possible dental damage if relevant, major complications, and any alternative forms of anesthetic management.   All of the patient's questions were answered to the best of my ability. The patient desires the anesthetic management as planned.  NEVAEH LINDSAY MD  2025 11:00 AM  Present on Admission:  **None**           [1]   Past Medical History:   Allergic rhinitis - cat,ragweed,weeds,dust mites    Anemia    during pregnancy    Anxiety    no meds    Asthma (HCC)    B12 deficiency    Cyst of ovary, left    Depression    19-20yo, not treated with meds, depression & anxiety    Easy bruising    Exercise-induced asthma (HCC)    TMJ (temporomandibular joint syndrome)    Visual impairment    Vitamin D deficiency   [2]   Past Surgical History:  Procedure Laterality Date      2015, 2016    Colonoscopy N/A 2018    Procedure: COLONOSCOPY;  Surgeon: Jayjay Lozoya MD;  Location: [facility] ENDOSCOPY    Colonoscopy N/A 2023    Procedure: COLONOSCOPY;  Surgeon: Jayjay Lozoya MD;  Location: [facility] ENDOSCOPY    Colonoscopy      Special service or report      warts removed on rt foot    Tubal ligation     [3]   Medications Prior to Admission    Medication Sig Dispense Refill Last Dose/Taking    PREBIOTIC PRODUCT OR Take by mouth.   4/29/2025    Turmeric (QC TUMERIC COMPLEX OR) Take by mouth.   4/29/2025    ALPRAZolam (XANAX) 0.5 MG Oral Tab Take 1 tablet (0.5 mg total) by mouth 2 (two) times daily as needed for Anxiety. 28 tablet 0 Past Month    FIBER OR Take by mouth.   4/29/2025    Multiple Vitamin (MULTIVITAMIN ADULT OR) Take by mouth in the morning.   4/29/2025    dexamethasone 0.5 MG/5ML Oral Solution Take 5 mL (0.5 mg total) by mouth 3 (three) times daily. 240 mL 0 More than a month    albuterol 108 (90 Base) MCG/ACT Inhalation Aero Soln Inhale 2 puffs into the lungs every 6 (six) hours as needed for Wheezing. 1 each 0 More than a month   [4]   Current Facility-Administered Medications Ordered in Epic   Medication Dose Route Frequency Provider Last Rate Last Admin    lactated ringers infusion   Intravenous Continuous Jeri Zavala MD 20 mL/hr at 05/05/25 1019 New Bag at 05/05/25 1019     No current [facility]-ordered outpatient medications on file.   [5]   Allergies  Allergen Reactions    Azithromycin DIARRHEA     Stomach upset    Bactrim [Sulfamethoxazole W/Trimethoprim, Co-Trimoxazole] DIARRHEA     Stomach upset   [6]   Family History  Problem Relation Age of Onset    Heart Disorder Paternal Grandfather     High Cholesterol Paternal Grandfather     Other (Other) Maternal Grandmother         osteoarthritis     Hypertension Maternal Grandfather     Cancer Maternal Grandfather         lung    High Cholesterol Paternal Grandmother     Heart Disorder Father     Cancer Maternal Aunt 50        colon ca   [7]   Social History  Socioeconomic History    Marital status:     Number of children: 2   Occupational History    Occupation:    Tobacco Use    Smoking status: Never     Passive exposure: Never    Smokeless tobacco: Never   Vaping Use    Vaping status: Never Used   Substance and Sexual Activity    Alcohol use: Yes     Comment: social      Drug use: No    Sexual activity: Yes     Partners: Male     Birth control/protection: Tubal Ligation   Other Topics Concern    Caffeine Concern Yes     Comment: 2 cups a day    Exercise No     Comment: 5x a week    Seat Belt Yes

## 2025-05-05 NOTE — OPERATIVE REPORT
[facility] OPERATING ROOM  Operative Note     Serenity Spears Location: OR   CSN [id number] MRN [id number]   Admission Date 5/5/2025 Operation Date 5/5/2025   Attending Physician Jeri Zavala MD Operating Physician Jeri Zavala MD      Preoperative Diagnosis: Menorrhagia with regular cycle [N92.0]  Clitoral lesion      Postoperative Diagnosis:  Same      Procedure Performed:   Hysteroscopy, dilation and curettage with novasure endometrial ablation with clitoral biopsy     Primary Surgeon: Jeri Zavala MD      Assistant: none      Surgical Findings: small clitoral lesion, thickened endometrium      Anesthesia: MAC     Complications: none      Implants: * No implants in log *     Specimen: clitoral lesion biopsy and endometrial curetting      Drains: none      Condition: stable     Estimated Blood Loss: Minimal      Summary of Case: The patient was taken to the operating room.  She was then prepped and draped in the normal sterile fashion. She was placed in the dorsal lithotomy position using the Jose Raul stirrups, correct positioning was verified twice.  She was then prepped and draped in the normal sterile fashion. Time out procedure was done      A weighted vaginal speculum was placed the anterior lip of the cervix was grasped with a tenaculum.  The hysteroscope was placed into endocervical canal and under direct  visualization the uterine cavity was entered.  The uterine cavity was inspected and polyps were seen.  Hysteroscopy was removed and a D & C was done with honey onofre - all specimens sent to lab.  Fluid deficit was 15 cc    The Novasure device was then use for the endometrial ablation.  The uterine cavity length was 6.0 cm and width was 4.5 cm.  After initial perforation test was passed, the ablation was done for a full cycle.  The device was removed after procedure complete.    The tenaculum was remove, site was hemostatic.  A biopsy of a small clitoral lesion was done with  scissors, good hemostasis, abx ointment applied. Patient was taken out of dorsal lithotomy position, awaken and taken to [facility] in stable condition.  Post procedure time out was done. All OR counts were correct.          Jeri Zavala MD  5/5/2025  11:32 AM

## 2025-05-05 NOTE — ANESTHESIA POSTPROCEDURE EVALUATION
Patient: Serenity Spears    Procedure Summary       Date: 05/05/25 Room / Location: [facility] MAIN OR 02 / [facility] MAIN OR    Anesthesia Start: 1109 Anesthesia Stop: 1138    Procedures:       Hysteroscopy, dilation and curettage with novasure endometrial ablation with clitoral biopsy      Endometrial ablation Diagnosis:       Menorrhagia with regular cycle      Labial cyst      (Menorrhagia with regular cycle [N92.0])      (Labial cyst [N90.7])    Surgeons: Jeri Zavala MD Anesthesiologist: Renard Damon MD    Anesthesia Type: MAC ASA Status: 2            Anesthesia Type: MAC    Vitals Value Taken Time   /66 05/05/25 11:38   Temp 98.2 05/05/25 11:38   Pulse 62 05/05/25 11:38   Resp 15 05/05/25 11:38   SpO2 100 % 05/05/25 11:38   Vitals shown include unfiled device data.    EM AN Post Evaluation:   Patient Evaluated in PACU  Patient Participation: complete - patient participated  Level of Consciousness: awake  Pain Score: 0  Pain Management: adequate  Airway Patency:  Dental exam unchanged from preop  Yes    Nausea/Vomiting: none  Cardiovascular Status: acceptable  Respiratory Status: acceptable  Postoperative Hydration acceptable      Charlotte Barnett CRNA  5/5/2025 11:38 AM

## 2025-05-05 NOTE — DISCHARGE INSTRUCTIONS
HOME INSTRUCTIONS    Home Care Instructions Following Your D&C, Hysteroscopy, or Endometrial Ablation     You may be feeling tired for the next week.  You may exercise if you feel up to it.    You may maintain a general diet and resume your home medications as instructed.    For the next 2 weeks:  Pelvic Rest  No sex, no tampons, no douching    Wound Care  Soap and Water Daily.  Pat dry, do not rub   Shower only for the next 2 weeks.    Call our office if you have a fever above 100.5, pain that is not relieved by pain medication, or bleeding more than a light period.    AMBSURG HOME CARE INSTRUCTIONS: POST-OP ANESTHESIA  The medication that you received for sedation or general anesthesia can last up to 24 hours. Your judgment and reflexes may be altered, even if you feel like your normal self.      We Recommend:   Do not drive any motor vehicle or bicycle   Avoid mowing the lawn, playing sports, or working with power tools/applicances (power saws, electric knives or mixers)   That you have someone stay with you on your first night home   Do not drink alcohol or take sleeping pills or tranquilizers   Do not sign legal documents within 24 hours of your procedure   If you had a nerve block for your surgery, take extra care not to put any pressure on your arm or hand for 24 hours    It is normal:  For you to have a sore throat if you had a breathing tube during surgery (while you were asleep!). The sore throat should get better within 48 hours. You can gargle with warm salt water (1/2 tsp in 4 oz warm water) or use a throat lozenge for comfort  To feel muscle aches or soreness especially in the abdomen, chest or neck. The achy feeling should go away in the next 24 hours  To feel weak, sleepy or \"wiped out\". Your should start feeling better in the next 24 hours.   To experience mild discomforts such as sore lip or tongue, headache, cramps, gas pains or a bloated feeling in your abdomen.   To experience mild back pain or  soreness for a day or two if you had spinal or epidural anesthesia.   If you had laparoscopic surgery, to feel shoulder pain or discomfort on the day of surgery.   For some patients to have nausea after surgery/anesthesia    If you feel nausea or experience vomiting:   Try to move around less.   Eat less than usual or drink only liquids until the next morning   Nausea should resolve in about 24 hours    If you have a problem when you are at home:    Call your surgeons office

## 2025-05-05 NOTE — INTERVAL H&P NOTE
Pre-op Diagnosis: Menorrhagia with regular cycle [N92.0]  Labial cyst [N90.7]    The above referenced H&P was reviewed by Jeri Zavala MD on 5/5/2025, the patient was examined and no significant changes have occurred in the patient's condition since the H&P was performed.  I discussed with the patient and/or legal representative the potential benefits, risks and side effects of this procedure; the likelihood of the patient achieving goals; and potential problems that might occur during recuperation.  I discussed reasonable alternatives to the procedure, including risks, benefits and side effects related to the alternatives and risks related to not receiving this procedure.  We will proceed with procedure as planned.

## 2025-05-16 ENCOUNTER — LAB ENCOUNTER (OUTPATIENT)
Dept: LAB | Age: 40
End: 2025-05-16
Attending: INTERNAL MEDICINE
Payer: COMMERCIAL

## 2025-05-16 ENCOUNTER — TELEMEDICINE (OUTPATIENT)
Dept: INTERNAL MEDICINE CLINIC | Facility: CLINIC | Age: 40
End: 2025-05-16
Payer: COMMERCIAL

## 2025-05-16 DIAGNOSIS — R53.83 FATIGUE, UNSPECIFIED TYPE: ICD-10-CM

## 2025-05-16 DIAGNOSIS — R53.83 FATIGUE, UNSPECIFIED TYPE: Primary | ICD-10-CM

## 2025-05-16 LAB
CORTIS SERPL-MCNC: 4.4 UG/DL
ESTRADIOL SERPL-MCNC: 76.6 PG/ML
FSH SERPL-ACNC: 12.1 MIU/ML
LH SERPL-ACNC: 15.7 MIU/ML
TSI SER-ACNC: 0.79 UIU/ML (ref 0.55–4.78)

## 2025-05-16 PROCEDURE — 82533 TOTAL CORTISOL: CPT

## 2025-05-16 PROCEDURE — 82670 ASSAY OF TOTAL ESTRADIOL: CPT

## 2025-05-16 PROCEDURE — 83002 ASSAY OF GONADOTROPIN (LH): CPT

## 2025-05-16 PROCEDURE — 84443 ASSAY THYROID STIM HORMONE: CPT

## 2025-05-16 PROCEDURE — 83001 ASSAY OF GONADOTROPIN (FSH): CPT

## 2025-05-16 PROCEDURE — 36415 COLL VENOUS BLD VENIPUNCTURE: CPT

## 2025-05-16 NOTE — PROGRESS NOTES
Subjective:   Patient ID: Serenity Spears is a 39 year old female.  Telehealth outside of [facility]  Telehealth Verbal Consent   I conducted a telehealth visit with Serenity Spears today, 05/16/25, which was completed using two-way, real-time interactive audio communication. This has been done in good misa to provide continuity of care in the best interest of the provider-patient relationship, due to the COVID - public health crisis/national emergency where restrictions of face-to-face office visits are ongoing. Every conscious effort was taken to allow for sufficient and adequate time to complete the visit.  The patient was made aware of the limitations of the telehealth visit, including treatment limitations as no physical exam could be performed.  The patient was advised to call 911 or to go to the ER in case there was an emergency.  The patient was also advised of the potential privacy & security concerns related to the telehealth platform.   The patient was made aware of where to find [facility]'s notice of privacy practices, telehealth consent form and other related consent forms and documents.  which are located on the [facility] website. The patient verbally agreed to telehealth consent form, related consents and the risks discussed.    Lastly, the patient confirmed that they were in [place].   Included in this visit, time may have been spent reviewing labs, medications, radiology tests and decision making. Appropriate medical decision-making and tests are ordered as detailed in the plan of care above.  Coding/billing information is submitted for this visit based on complexity of care and/or time spent for the visit.  Time spent 6 minutes    HPI  Pt would like to get labs testing to evaluate fatigue and difficulty with losing wt around her mid abd  No fever /chills. No menses  No sob  History/Other:   Review of Systems   All other systems reviewed and are negative.    Current  Medications[1]  Allergies:Allergies[2]    Objective:   Physical Exam  Constitutional:       General: She is not in acute distress.  Pulmonary:      Comments: speaks in full sentences w/o distress            Assessment & Plan:   1. Fatigue, unspecified type      Check labs as ordered  Orders Placed This Encounter   Procedures    Estradiol [E]    Cortisol [E]    FSH [E]    LH (Luteinizing Hormone) [E]    TSH [E]       Meds This Visit:  Requested Prescriptions      No prescriptions requested or ordered in this encounter       Imaging & Referrals:  None         [1]   Current Outpatient Medications   Medication Sig Dispense Refill    PREBIOTIC PRODUCT OR Take by mouth.      Turmeric (QC TUMERIC COMPLEX OR) Take by mouth.      ALPRAZolam (XANAX) 0.5 MG Oral Tab Take 1 tablet (0.5 mg total) by mouth 2 (two) times daily as needed for Anxiety. 28 tablet 0    FIBER OR Take by mouth.      dexamethasone 0.5 MG/5ML Oral Solution Take 5 mL (0.5 mg total) by mouth 3 (three) times daily. 240 mL 0    albuterol 108 (90 Base) MCG/ACT Inhalation Aero Soln Inhale 2 puffs into the lungs every 6 (six) hours as needed for Wheezing. 1 each 0    Multiple Vitamin (MULTIVITAMIN ADULT OR) Take by mouth in the morning.     [2]   Allergies  Allergen Reactions    Azithromycin DIARRHEA     Stomach upset    Bactrim [Sulfamethoxazole W/Trimethoprim, Co-Trimoxazole] DIARRHEA     Stomach upset

## 2025-05-19 ENCOUNTER — TELEPHONE (OUTPATIENT)
Dept: OBGYN CLINIC | Facility: CLINIC | Age: 40
End: 2025-05-19

## 2025-05-19 RX ORDER — CLOBETASOL PROPIONATE 0.5 MG/G
OINTMENT TOPICAL
Qty: 60 G | Refills: 2 | Status: SHIPPED | OUTPATIENT
Start: 2025-05-19 | End: 2025-06-23

## 2025-05-19 NOTE — TELEPHONE ENCOUNTER
----- Message from Jeri Zavala sent at 5/18/2025  5:43 PM CDT -----  Normal pathology for endometrium   Lichen - taper dose of clobetasol    Jeri Zavala MD       ----- Message -----  From: Lab, Background User  Sent: 5/6/2025  10:35 AM CDT  To: Jeri Zavala MD

## 2025-05-23 ENCOUNTER — OFFICE VISIT (OUTPATIENT)
Dept: OBGYN CLINIC | Facility: CLINIC | Age: 40
End: 2025-05-23

## 2025-05-23 VITALS
HEIGHT: 65 IN | DIASTOLIC BLOOD PRESSURE: 82 MMHG | SYSTOLIC BLOOD PRESSURE: 124 MMHG | BODY MASS INDEX: 22.26 KG/M2 | WEIGHT: 133.63 LBS

## 2025-05-23 DIAGNOSIS — L90.0 LICHEN SCLEROSUS: ICD-10-CM

## 2025-05-23 DIAGNOSIS — N92.0 MENORRHAGIA WITH REGULAR CYCLE: Primary | ICD-10-CM

## 2025-05-23 DIAGNOSIS — N90.7 LABIAL CYST: ICD-10-CM

## 2025-05-23 PROCEDURE — 3008F BODY MASS INDEX DOCD: CPT | Performed by: OBSTETRICS & GYNECOLOGY

## 2025-05-23 PROCEDURE — 3074F SYST BP LT 130 MM HG: CPT | Performed by: OBSTETRICS & GYNECOLOGY

## 2025-05-23 PROCEDURE — 3079F DIAST BP 80-89 MM HG: CPT | Performed by: OBSTETRICS & GYNECOLOGY

## 2025-05-23 PROCEDURE — 99213 OFFICE O/P EST LOW 20 MIN: CPT | Performed by: OBSTETRICS & GYNECOLOGY

## 2025-05-23 NOTE — PROGRESS NOTES
Last pap: 04/04/24 : NL, - HPV.    Post-op follow up.      Visit Type: Post-operative follow-up    Date of Procedure:  05/05/2025    Procedure:  Hysteroscopy, dilation and curettage with novasure endometrial ablation with clitoral biopsy   Endometrial ablation     Indications for Procedure:  Menorrhagia with regular cycle,Labial cyst    Pathology Report: A. Endometrial curettings:   · Fragments of late secretory endometrium with extensive shedding.  · No evidence of atypical hyperplasia or carcinoma identified.    B. Clitoral lesion; biopsy:  · Compatible with lichen sclerosus et atrophicus.      Surgery/Post-op Complications: light spotting.    Pain:  0    Medications pertaining to Surgery:  none    Incision (if applicable):  n/a    Diet: normal    Voiding:  normal    Bowel movements/Flatus:  normal    Activity: normal    Problems: vaginal spotting sometimes.     /82   Ht 5' 5\" (1.651 m)   Wt 133 lb 9.6 oz (60.6 kg)   LMP 04/05/2025 (Approximate)     Wt Readings from Last 3 Encounters:   05/23/25 133 lb 9.6 oz (60.6 kg)   05/05/25 134 lb (60.8 kg)   04/11/25 132 lb (59.9 kg)       Health Maintenance   Topic Date Due    COVID-19 Vaccine (4 - 2024-25 season) 06/16/2025 (Originally 9/1/2024)    Influenza Vaccine (Season Ended) 10/01/2025    Annual Physical  04/11/2026    Asthma Control Test  04/11/2026    DTaP,Tdap,and Td Vaccines (2 - Td or Tdap) 06/28/2026    Pap Smear  04/04/2027    Colorectal Cancer Screening  11/09/2028    Pneumococcal Vaccine: Birth to 50yrs (3 of 3 - PCV20 or PCV21) 07/31/2036    Annual Depression Screening  Completed    Meningococcal B Vaccine  Aged Out         Review of Systems   General: Present- Feeling well. Not Present- Fever.  Female Genitourinary: Not Present- Dysmenorrhea, Dyspareunia, Flank Pain, Frequency, Menstrual Irregularities, Pelvic Pain, Urgency, Urinary Complaints, Vaginal Bleeding and Vaginal dryness.  Pain: Present- Pain Rating - 0 on a 0-10 scale.  All other  systems negative       Physical Exam   The physical exam findings are as follows:     Abdomen   Inspection: - Inspection Normal.  Palpation/Percussion: Palpation and Percussion of the abdomen reveal - Non Tender, No hepatosplenomegaly and No Palpable abdominal masses.    Female Genitourinary     External Genitalia   Perineum - Normal. Bartholin's Gland - Bilateral - Normal. Clitoris - Normal.  Introitus: Characteristics - Normal. Discharge - None.  Labia Majora: Lesions - Bilateral - None. Characteristics - Bilateral - Normal.  Labia Minora: Lesions - Bilateral - None. Characteristics - Bilateral - Normal.  Urethra: Characteristics - Normal. Discharge - None.  [employer] Gland - Bilateral - Normal.  Vulva: Characteristics - Normal. Lesions - None.    Speculum & Bimanual   Vagina:   Vaginal Wall: - Normal.  Vaginal Lesions - None. Vaginal Mucosa - Normal.  Cervix: Characteristics - No Motion tenderness. Discharge - None.  Uterus: Characteristics - Non Tender. Position - Midposition.  Adnexa: Characteristics - Bilateral - Tender. Masses - No Adnexal Masses.  Bladder - Normal.    Rectal   Anorectal Exam: External - normal external exam.    Lymphatic  General Lymphatics   Description - Normal .    1. Menorrhagia with regular cycle    2. Labial cyst

## (undated) DIAGNOSIS — K12.0 RECURRENT APHTHOUS STOMATITIS: ICD-10-CM

## (undated) DIAGNOSIS — E55.9 VITAMIN D DEFICIENCY: ICD-10-CM

## (undated) DIAGNOSIS — Z00.00 ANNUAL PHYSICAL EXAM: Primary | ICD-10-CM

## (undated) DIAGNOSIS — E53.8 VITAMIN B 12 DEFICIENCY: ICD-10-CM

## (undated) DEVICE — KIT CLEAN ENDOKIT 1.1OZ GOWNX2

## (undated) DEVICE — SNARE OPTMZ PLPCTM TRP

## (undated) DEVICE — MEDI-VAC NON-CONDUCTIVE SUCTION TUBING 6MM X 1.8M (6FT.) L: Brand: CARDINAL HEALTH

## (undated) DEVICE — HANDPIECE ABLAT DIA6MM ENDOMET IMPED CTRL DEV

## (undated) DEVICE — SOLUTION IRRIG 3000ML 0.9% NACL FLX CONT

## (undated) DEVICE — 1016 S-DRAPE IRRIG POUCH 10/BOX: Brand: STERI-DRAPE™

## (undated) DEVICE — 60 ML SYRINGE REGULAR TIP: Brand: MONOJECT

## (undated) DEVICE — CANISTER SUCT 3000CC HI FLO DISP FOR FLD MGMT

## (undated) DEVICE — Device: Brand: DEFENDO AIR/WATER/SUCTION AND BIOPSY VALVE

## (undated) DEVICE — KIT ENDO ORCAPOD 160/180/190

## (undated) DEVICE — SNARE CAPTIFLEX MICRO-OVL OLY

## (undated) DEVICE — HYSTEROSCOPY: Brand: MEDLINE INDUSTRIES, INC.

## (undated) DEVICE — MEDI-VAC NON-CONDUCTIVE SUCTION TUBING: Brand: CARDINAL HEALTH

## (undated) DEVICE — Device: Brand: DUAL NARE NASAL CANNULAE FEMALE LUER CON 7FT O2 TUBE

## (undated) DEVICE — ENDOSCOPY PACK - LOWER: Brand: MEDLINE INDUSTRIES, INC.

## (undated) DEVICE — SNARE ENDOSCOPIC 10MM ROUND

## (undated) DEVICE — DRAPE,LITHOTOMY,STERILE: Brand: MEDLINE

## (undated) DEVICE — PAD,NON-ADHERENT,3X8,STERILE,LF,1/PK: Brand: MEDLINE

## (undated) DEVICE — FORCEP RADIAL JAW 4

## (undated) DEVICE — GLOVE SUR 6.5 SENSICARE PIP WHT PWD F

## (undated) NOTE — LETTER
ASTHMA ACTION PLAN for Fredo Chua     : 1985     Date: 2019  Provider:  ARGENTINA Chen  Phone for doctor or clinic: [facility] [address], [address] [place] [address] [phone]

## (undated) NOTE — LETTER
ASTHMA ACTION PLAN for La Ramos     : 1985     Date: 2023  Provider:  Miriam Campa PA-C  Phone for doctor or clinic: [facility], [place] [address], [address]  [place] Amend [phone]           You can use the colors of a traffic light to help learn about your asthma medicines. 1. Green - Go! % of Personal Best Peak Flow Use controller medicine. Breathing is good  No cough or wheeze  Can work and play Medicine How much to take When to take it          2. Yellow - Caution. 50-79% Personal Best Peak  Flow. Use reliever medicine to keep an asthma attack from getting bad. Cough  Wheezing  Tight Chest  Wake up at night Medicine How much to take When to take it    Albuterol inhaler,  2 puffs every four hours as needed. Additional instructions         3. Red - Stop! Danger!  <50% Personal Best Peak  Flow. Take these medications until  Get help from a doctor   Medicine not helping  Breathing is hard and fast  Nose opens wide  Can't walk  Ribs show  Can't talk well Medicine How much to take When to take it    Albuterol inhaler, 2 puffs NOW     Additional Instructions If your symptoms do not improve and you cannot contact your doctor, [facility] or call 911 immediately! [x] Asthma Action Plan reviewed with patient (and caregiver if necessary) and a copy of the plan was given to the patient/caregiver. [] Asthma Action Plan reviewed with patient (and caregiver if necessary) on the phone and mailed copy to patient or submitted via [address]      Signatures:  Provider  Miriam Campa PA-C   Patient Caretaker

## (undated) NOTE — MR AVS SNAPSHOT
[facility] [address]  [place]  [phone]               Thank you for choosing us for your health care visit with Godwin Gutierrez MD.  We are glad to serve you and happy to provide you with [facility] Dx:  Laboratory examination ordered as part of a routine general medical examination [Z00.00]           TSH W REFLEX TO FREE T4 [36522]    Complete by:  Jan 26, 2017 (Approximate)    Assoc Dx:  Laboratory examination ordered as part of a routine gen

## (undated) NOTE — LETTER
ASTHMA ACTION PLAN for Anastasiia Swenson     : 1985     Date: 2018  Provider:  Javier Betts PA-C  Phone for doctor or clinic: [facility], [address] [facility] , [address] [address] [facility]  [phone]

## (undated) NOTE — MR AVS SNAPSHOT
[facility] [address]  [facility]  [phone]               Thank you for choosing us for your health care visit with Ju Salinas PA-C.   We are glad to serve you and happy to provide you •To schedule Imaging or tests at [facility] Scheduling [phone], Go to [place] A ER Building (For example: CT scans, X rays, Ultrasound, MRI)  •Cardiac Testing in ER building Building A second floor Cardiac Testing [phone] (For example:  [employer] Narcotic medications can only be filled in 30 day increments and must be refilled at an office visit only. If your prescription is due for a refill, you may be due for a follow-up appointment.   We cannot refill your maintenance medications at a preventati office, you can view your past visit information in [facility] by going to Visits < Visit Summaries. [facility] questions? Call [phone] for help. [facility] is NOT to be used for urgent needs. For medical emergencies, dial 911.            Visit [facility]

## (undated) NOTE — MR AVS SNAPSHOT
[facility] [address]  [facility] [address] [phone]               Thank you for choosing us for your health care visit with EMG 20 NURSE.   We are glad to serve you and happy to provide you with this Visit [facility] online at  [email or web address]

## (undated) NOTE — LETTER
ASTHMA ACTION PLAN for Corrie Rubio     : 1985     Date: 3/9/2022  Provider:  Vadim Coy PA-C  Phone for doctor or clinic: [address] [place] [address] 2, [address] [address]  [place]  [id number] [phone]    ACT Score: 25      You can use the colors of a traffic light to help learn about your asthma medicines. 1. Green - Go! % of Personal Best Peak Flow Use controller medicine. Breathing is good  No cough or wheeze  Can work and play Medicine How much to take When to take it          2. Yellow - Caution. 50-79% Personal Best Peak  Flow. Use reliever medicine to keep an asthma attack from getting bad. Cough  Wheezing  Tight Chest  Wake up at night Medicine How much to take When to take it    Albuterol inhaler,  2 puffs every four hours as needed. Additional instructions         3. Red - Stop! Danger!  <50% Personal Best Peak  Flow. Take these medications until  Get help from a doctor   Medicine not helping  Breathing is hard and fast  Nose opens wide  Can't walk  Ribs show  Can't talk well Medicine How much to take When to take it    Albuterol inhaler, 2 puffs NOW     Additional Instructions If your symptoms do not improve and you cannot contact your doctor, [facility] or call 911 immediately! [x] Asthma Action Plan reviewed with patient (and caregiver if necessary) and a copy of the plan was given to the patient/caregiver. [] Asthma Action Plan reviewed with patient (and caregiver if necessary) on the phone and mailed copy to patient or submitted via [address]      Signatures:  Provider  Vadim Coy PA-C   Patient Caretaker

## (undated) NOTE — LETTER
ASTHMA ACTION PLAN for Ericka Fong     : 1985     Date: 3/9/2022  Provider:  Glover Goldberg, PA-C  Phone for doctor or clinic: [facility], [facility] 2, [address] [facility]  [address] [facility] [address] [facility] [address]  [place] [address] [phone]    ACT Score: 25      You can use the colors of a traffic light to help learn about your asthma medicines. 1. Green - Go! % of Personal Best Peak Flow Use controller medicine. Breathing is good  No cough or wheeze  Can work and play Medicine How much to take When to take it          2. Yellow - Caution. 50-79% Personal Best Peak  Flow. Use reliever medicine to keep an asthma attack from getting bad. Cough  Wheezing  Tight Chest  Wake up at night Medicine How much to take When to take it           Additional instructions         3. Red - Stop! Danger!  <50% Personal Best Peak  Flow. Take these medications until  Get help from a doctor   Medicine not helping  Breathing is hard and fast  Nose opens wide  Can't walk  Ribs show  Can't talk well Medicine How much to take When to take it         Additional Instructions If your symptoms do not improve and you cannot contact your doctor, [facility] or call 911 immediately! [x] Asthma Action Plan reviewed with patient (and caregiver if necessary) and a copy of the plan was given to the patient/caregiver. [] Asthma Action Plan reviewed with patient (and caregiver if necessary) on the phone and mailed copy to patient or submitted via [address]      Signatures:  Provider  Glover Goldberg, PA-C   Patient Caretaker

## (undated) NOTE — MR AVS SNAPSHOT
[facility] [address]  [facility] [address] [phone]               Thank you for choosing us for your health care visit with EMG 20 NURSE.   We are glad to serve you and happy to provide you with this Call [phone] for help. [employer] is NOT to be used for urgent needs. For medical emergencies, dial 911.            Visit [facility] online at  [email or web address]

## (undated) NOTE — MR AVS SNAPSHOT
[address] [facility] [address]  [facility] [address]  [phone]               Thank you for choosing us for your health care visit with EMG 20 NURSE.   We are glad to serve you and happy to provide you with this Visit [facility] online at  [email or web address]

## (undated) NOTE — MR AVS SNAPSHOT
[facility] [address]  [facility] [address] [phone]               Thank you for choosing us for your health care visit with EMG 20 NURSE.   We are glad to serve you and happy to provide you with this Call [phone] for help. [employer] is NOT to be used for urgent needs. For medical emergencies, dial 911.            Visit [facility] online at  [email or web address]

## (undated) NOTE — LETTER
ASTHMA ACTION PLAN for Jarred Ortiz     : 1985     Date: 2018  Provider:  Terrance Mayo MD  Phone for doctor or clinic: [address] [place] [address], [address] [place] [address] [address]

## (undated) NOTE — LETTER
MINOR CASE LETTER      3/21/2025        Dear Serenity,    Your are having a hysteroscopy with D&C and endometrial ablation with clitoral biopsy on Monday, May 5th, 2025 @ 1:30 at [facility]. Please arrive 2 hours early.     Do not eat or drink anything (including water) after midnight the night before surgery.    If your procedure is scheduled later in the day, then nothing by mouth for 6 hours before arrival to the hospital.    You are to call this office if you have any cold or flu symptoms 2 days before your scheduled surgery.    Please avoid aspirin 7 days before surgery.  Avoid Ibuprofen, Motrin, Aleve, or Naprosyn for 3 days before surgery.    You will be contacted by PreAdmission Testing (PAT) usually within the week before surgery.  They will take a short medical history and let you know if any preoperative testing is needed.    Contact your insurance company to let them know you are having a hysteroscopy with D&C and endometrial ablation with clitoral biopsy done.     Please make arrangements for someone to drive you home after your surgery.    Call our office now to schedule your post-operative appointment for 2 weeks after surgery.    Please feel free to contact our office at [phone] if you have any questions regarding these instructions or your procedure.      Sincerely,    Jrei Zavala MD  [facility], [facility] - OB/GYN  [address]  [facility] [id number] [phone]

## (undated) NOTE — MR AVS SNAPSHOT
[facility] [address]  [facility] [address] [phone]               Thank you for choosing us for your health care visit with EMG 20 NURSE.   We are glad to serve you and happy to provide you with this Call [phone] for help. [employer] is NOT to be used for urgent needs. For medical emergencies, dial 911.            Visit [facility] online at  [email or web address]

## (undated) NOTE — LETTER
ASTHMA ACTION PLAN for Andrea Ryan     : 1985     Date: 2020  Provider:  Pamela Gilliam PA-C  Phone for doctor or clinic: [address] [place] [address], [address] [facility] [address] [address] [phone]

## (undated) NOTE — MR AVS SNAPSHOT
After Visit Summary   6/19/2017    Ann Bishop    MRN: [id number]           Diagnoses this Visit     Elevated total protein    -  Primary     Elevated protime         Easy bruising           Allergies     Azithromycin Diarrhea    Stomach upset

## (undated) NOTE — LETTER
ASTHMA ACTION PLAN for Serenity Spears     : 1985     Date: 2024  Provider:  Harshil Olmstead MD  Phone for doctor or clinic: [facility], [address] [place] [address] [phone]    ACT Score: 25      You can use the colors of a traffic light to help learn about your asthma medicines.      1. Green - Go! % of Personal Best Peak Flow Use controller medicine.   Breathing is good  No cough or wheeze  Can work and play Medicine How much to take When to take it    none      2. Yellow - Caution. 50-79% Personal Best Peak  Flow.  Use reliever medicine to keep an asthma attack from getting bad.   Cough  Wheezing  Tight Chest  Wake up at night Medicine How much to take When to take it    Albuterol inhaler,  2 puffs every four hours as needed.       Additional instructions         3. Red - Stop! Danger!  <50% Personal Best Peak  Flow. Take these medications until  Get help from a doctor   Medicine not helping  Breathing is hard and fast  Nose opens wide  Can't walk  Ribs show  Can't talk well Medicine How much to take When to take it    Albuterol inhaler,  2 puffs now       Additional Instructions If your symptoms do not improve and you cannot contact your doctor, [facility] or call 911 immediately!     [x] Asthma Action Plan reviewed with patient (and caregiver if necessary) and a copy of the plan was given to the patient/caregiver.   [] Asthma Action Plan reviewed with patient (and caregiver if necessary) on the phone and mailed copy to patient or submitted via [employer].     Signatures:  Provider  Harshil Olmstead MD   Patient Caretaker

## (undated) NOTE — MR AVS SNAPSHOT
[facility] [address]  [facility] [address] [phone]               Thank you for choosing us for your health care visit with EMG 20 NURSE.   We are glad to serve you and happy to provide you with this Call [phone] for help. [employer] is NOT to be used for urgent needs. For medical emergencies, dial 911.            Visit [facility] online at  [email or web address]

## (undated) NOTE — MR AVS SNAPSHOT
[facility] [address]  [address] [facility]  [phone]               Thank you for choosing us for your health care visit with Hawk Baird MD.  We are glad to serve you and happy to provide you with t at [phone].          Reason for Today's Visit     Bruising     Abdominal Pain           Medical Issues Discussed Today     B12 deficiency    Bruising    Vitamin D deficiency    LLQ abdominal pain          Instructions and Information about Your Health aware of all of the risks of treatment even beyond those discussed today.  All therapies have potential risk of harm or side effects or medication interactions.  It is your duty and for your safety to discuss with the pharmacist and our office with vladimir cyanocobalamin 1000 MCG/ML Soln   Inject 1,000 mcg into the muscle every 30 (thirty) days. Commonly known as:  VITAMIN B12           Scopolamine 1 MG/3DAYS Pt72   Place 1 patch onto the skin every third day.    Commonly known as:  TRANSDERM-SCOP

## (undated) NOTE — LETTER
1/16/2023    [address] [place] [address]            Dear Rodríguez Pepe,      Our records indicate that you are due for an appointment for a Colonoscopy with Mitul Godoy MD. Our doctors are booking out about 3-5 months in advance for procedures. Please call our office to schedule a phone screening appointment to plan for the procedure(s). Your medical well-being is important to us. If your insurance requires a referral, please call your primary care office to request one.       Thank you,      The Physicians and Staff at [facility]

## (undated) NOTE — MR AVS SNAPSHOT
After Visit Summary   2/10/2020    Ermias Dong    MRN: [id number]           Visit Information     Date & Time  2/10/2020  4:00 PM Provider  Melisa Aranda MD Department  [facility], [address] [facility]  Phone  [phone] patients. Video Visits are available Monday - Friday for many common conditions such as allergies, colds, cough, fever, rash, sore throat, headache and pink eye.   The cost for a Video Visit is currently $35.         If you receive a survey from [employer] Saturday – Sunday  10:00 am – 4:00 pm  WALK-IN CARE  Emergency Medicine Providers  Conditions needing urgent attention, but are   non-life-threatening.     Also available by appointment     Average cost  $120*     EMERGENCY ROOM         Life-threatening kaleb

## (undated) NOTE — ED AVS SNAPSHOT
Andrea Ryan   MRN: [id number]    Department:  [address] [facility] in [facility]   Date of Visit:  1/24/2018           Disclosure     Insurance plans vary and the physician(s) referred by the ER may not be covered by your plan.  Please contac tell this physician (or your personal doctor if your instructions are to return to your personal doctor) about any new or lasting problems. The primary care or specialist physician will see patients referred from the [facility] Emergency Department.  Rito Layton